# Patient Record
Sex: MALE | Employment: FULL TIME | RURAL
[De-identification: names, ages, dates, MRNs, and addresses within clinical notes are randomized per-mention and may not be internally consistent; named-entity substitution may affect disease eponyms.]

---

## 2022-01-07 ENCOUNTER — OFFICE VISIT (OUTPATIENT)
Dept: FAMILY MEDICINE CLINIC | Age: 52
End: 2022-01-07
Payer: COMMERCIAL

## 2022-01-07 VITALS
HEART RATE: 106 BPM | DIASTOLIC BLOOD PRESSURE: 125 MMHG | WEIGHT: 278 LBS | TEMPERATURE: 98.2 F | SYSTOLIC BLOOD PRESSURE: 184 MMHG | OXYGEN SATURATION: 97 % | BODY MASS INDEX: 39.8 KG/M2 | RESPIRATION RATE: 14 BRPM | HEIGHT: 70 IN

## 2022-01-07 DIAGNOSIS — I10 PRIMARY HYPERTENSION: Primary | ICD-10-CM

## 2022-01-07 DIAGNOSIS — E66.09 CLASS 2 OBESITY DUE TO EXCESS CALORIES WITHOUT SERIOUS COMORBIDITY WITH BODY MASS INDEX (BMI) OF 39.0 TO 39.9 IN ADULT: ICD-10-CM

## 2022-01-07 PROCEDURE — 99204 OFFICE O/P NEW MOD 45 MIN: CPT | Performed by: FAMILY MEDICINE

## 2022-01-07 RX ORDER — HYDROCHLOROTHIAZIDE 12.5 MG/1
12.5 CAPSULE ORAL DAILY
Qty: 90 CAPSULE | Refills: 0 | Status: SHIPPED | OUTPATIENT
Start: 2022-01-07 | End: 2022-04-04 | Stop reason: SDUPTHER

## 2022-01-07 RX ORDER — AMLODIPINE BESYLATE 10 MG/1
10 TABLET ORAL DAILY
Qty: 90 TABLET | Refills: 0 | Status: SHIPPED | OUTPATIENT
Start: 2022-01-07 | End: 2022-04-04 | Stop reason: SDUPTHER

## 2022-01-07 RX ORDER — AMLODIPINE BESYLATE 10 MG/1
10 TABLET ORAL DAILY
COMMUNITY
End: 2022-01-07 | Stop reason: SDUPTHER

## 2022-01-07 RX ORDER — HYDROCHLOROTHIAZIDE 12.5 MG/1
12.5 CAPSULE ORAL DAILY
COMMUNITY
End: 2022-01-07 | Stop reason: SDUPTHER

## 2022-01-07 NOTE — PROGRESS NOTES
1. Have you been to the ER, urgent care clinic since your last visit? Hospitalized since your last visit? Yes When: UTI urgent care in [place] a few months ago     2. Have you seen or consulted any other health care providers outside of the [address] [place] [address] since your last visit? Include any pap smears or colon screening. No    Reviewed record in preparation for visit and have necessary documentation  Pt did not bring medication to office visit for review  Patient is accompanied by self I have received verbal consent from Rob Christine to discuss any/all medical information while they are present in the room.     Goals that were addressed and/or need to be completed during or after this appointment include     Health Maintenance Due   Topic Date Due    Hepatitis C Screening  Never done    COVID-19 Vaccine (1) Never done    DTaP/Tdap/Td series (1 - Tdap) Never done    Lipid Screen  Never done    Colorectal Cancer Screening Combo  Never done    Shingrix Vaccine Age 50> (1 of 2) Never done    Flu Vaccine (1) Never done

## 2022-01-10 PROBLEM — I10 PRIMARY HYPERTENSION: Status: ACTIVE | Noted: 2022-01-10

## 2022-01-11 NOTE — PROGRESS NOTES
Progress Note    Patient: Corrie Lyn MRN: [id number]  SSN: [id number]    YOB: 1970  Age: 45 y.o. Sex: male        Chief Complaint   Patient presents with   [address]     he is a 45y.o. year old male new to practice who presents to [facility] care. Patient with hx of HTN. He has been out of medication for 6 months. Patient denies HA, dizziness, SOB, CP, abdominal pain, dysuria, acute myalgias or arthralgias. Encounter Diagnoses   Name Primary?  Primary hypertension Yes    Class 2 obesity due to excess calories without serious comorbidity with body mass index (BMI) of 39.0 to 39.9 in adult        There is no problem list on file for this patient. History reviewed. No pertinent surgical history. Social History     Socioeconomic History    Marital status:      Spouse name: Not on file    Number of children: Not on file    Years of education: Not on file    Highest education level: Not on file   Occupational History    Not on file   Tobacco Use    Smoking status: Never Smoker    Smokeless tobacco: Not on file   Substance and Sexual Activity    Alcohol use: Not on file    Drug use: Not on file    Sexual activity: Not on file   Other Topics Concern    Not on file   Social History Narrative    Not on file     Social Determinants of Health     Financial Resource Strain:     Difficulty of Paying Living Expenses: Not on file   Food Insecurity:     Worried About Running Out of Food in the Last Year: Not on file    James of Food in the Last Year: Not on file   Transportation Needs:     Lack of Transportation (Medical): Not on file    Lack of Transportation (Non-Medical):  Not on file   Physical Activity:     Days of Exercise per Week: Not on file    Minutes of Exercise per Session: Not on file   Stress:     Feeling of Stress : Not on file   Social Connections:     Frequency of Communication with Friends and Family: Not on file    Frequency of Social Gatherings with Friends and Family: Not on file    Attends Jewish Services: Not on file    Active Member of Clubs or Organizations: Not on file    Attends Club or Organization Meetings: Not on file    Marital Status: Not on file   Intimate Partner Violence:     Fear of Current or Ex-Partner: Not on file    Emotionally Abused: Not on file    Physically Abused: Not on file    Sexually Abused: Not on file   Housing Stability:     Unable to Pay for Housing in the Last Year: Not on file    Number of [place] in the Last Year: Not on file    Unstable Housing in the Last Year: Not on file     No family history on file. Current Outpatient Medications   Medication Sig    amLODIPine (NORVASC) 10 mg tablet Take 1 Tablet by mouth daily.  hydroCHLOROthiazide (MICROZIDE) 12.5 mg capsule Take 1 Capsule by mouth daily. No current facility-administered medications for this visit. Not on File    Review of Systems:  Constitutional: Negative for fatigue, malaise  Resp: Negative for cough, wheezing or SOB  CV: Negative for chest pain, dizziness or palpitations  GI: Negative for nausea or abdominal pain  MS: Negative for acute myalgias or arthralgias   Neuro: Negative for HA, weakness or paresthesia  Psych: Negative for depression or anxiety     Vitals:    01/07/22 0907 01/07/22 0933   BP: (!) 180/127 (!) 184/125   Pulse: (!) 104 (!) 106   Resp: 14    Temp: 98.2 °F (36.8 °C)    TempSrc: Oral    SpO2: 97%    Weight: 278 lb (126.1 kg)    Height: 5' 10\" (1.778 m)        Physical Examination:  General: Well developed, well nourished, in no acute distress  Head: Normocephalic, atraumatic  Eyes: Sclera clear, EOMI  Neck: Normal range of motion  Respiratory: symmetrical, unlabored effort  Cardiovascular: Regular rate and rhythm  Extremities: Full range of motion, normal gait  Neurologic: No focal deficits  Psych: Active, alert and oriented. Affect appropriate       ICD-10-CM ICD-9-CM    1.  Primary hypertension  I10 401.9 LIPID PANEL      METABOLIC PANEL, COMPREHENSIVE      TSH 3RD GENERATION   2. Class 2 obesity due to excess calories without serious comorbidity with body mass index (BMI) of 39.0 to 39.9 in adult  E66.09 278.00     Z68.39 V85.39        Plan of care:  Diagnoses were discussed in detail with patient. Medication risks/benefits/side effects discussed with patient. All of the patient's questions were addressed and answered to apparent satisfaction. The patient understands and agrees with our plan of care. The patient knows to call back if they have questions about the plan of care or if symptoms change. The patient received an After-Visit Summary which contains VS, diagnoses, orders, allergy and medication lists. Future Appointments   Date Time Provider Raza Dash   1/11/2022  8:30 AM LAB [facility] BS AMB   4/1/2022  8:00 [facility]           Follow-up and Dispositions    · Return in about 12 weeks (around 4/1/2022), or if symptoms worsen or fail to improve.

## 2022-03-19 PROBLEM — I10 PRIMARY HYPERTENSION: Status: ACTIVE | Noted: 2022-01-10

## 2022-04-01 ENCOUNTER — OFFICE VISIT (OUTPATIENT)
Dept: FAMILY MEDICINE CLINIC | Age: 52
End: 2022-04-01
Payer: COMMERCIAL

## 2022-04-01 VITALS
SYSTOLIC BLOOD PRESSURE: 126 MMHG | DIASTOLIC BLOOD PRESSURE: 90 MMHG | RESPIRATION RATE: 18 BRPM | HEIGHT: 70 IN | TEMPERATURE: 98.1 F | OXYGEN SATURATION: 98 % | HEART RATE: 63 BPM | BODY MASS INDEX: 37.02 KG/M2 | WEIGHT: 258.6 LBS

## 2022-04-01 DIAGNOSIS — E66.9 OBESITY (BMI 35.0-39.9 WITHOUT COMORBIDITY): ICD-10-CM

## 2022-04-01 DIAGNOSIS — Z11.59 ENCOUNTER FOR HEPATITIS C SCREENING TEST FOR LOW RISK PATIENT: ICD-10-CM

## 2022-04-01 DIAGNOSIS — I10 PRIMARY HYPERTENSION: Primary | ICD-10-CM

## 2022-04-01 DIAGNOSIS — Z12.11 SCREEN FOR COLON CANCER: ICD-10-CM

## 2022-04-01 DIAGNOSIS — R35.1 NOCTURIA: ICD-10-CM

## 2022-04-01 PROCEDURE — 99214 OFFICE O/P EST MOD 30 MIN: CPT | Performed by: FAMILY MEDICINE

## 2022-04-01 NOTE — PROGRESS NOTES
1. Have you been to the ER, urgent care clinic since your last visit? Hospitalized since your last visit? No    2. Have you seen or consulted any other health care providers outside of the [address] [place] [address] since your last visit? Include any pap smears or colon screening. No    3. For patients aged 39-70: Has the patient had a colonoscopy / FIT/ Cologuard? No    If the patient is female:    4. For patients aged 41-77: Has the patient had a mammogram within the past 2 years? NA - based on age or sex      11. For patients aged 21-65: Has the patient had a pap smear? NA - based on age or sex     Reviewed record in preparation for visit and have necessary documentation  Pt did not bring medication to office visit for review  Patient is accompanied by self I have received verbal consent from Chuy Geronimo to discuss any/all medical information while they are present in the room.     Goals that were addressed and/or need to be completed during or after this appointment include     Health Maintenance Due   Topic Date Due    Hepatitis C Screening  Never done    COVID-19 Vaccine (1) Never done    DTaP/Tdap/Td series (1 - Tdap) Never done    Colorectal Cancer Screening Combo  Never done    Shingrix Vaccine Age 50> (1 of 2) Never done

## 2022-04-02 LAB
ALBUMIN SERPL-MCNC: 4.5 G/DL (ref 3.8–4.9)
BUN SERPL-MCNC: 10 MG/DL (ref 6–24)
BUN/CREAT SERPL: 8 (ref 9–20)
CALCIUM SERPL-MCNC: 9.6 MG/DL (ref 8.7–10.2)
CHLORIDE SERPL-SCNC: 101 MMOL/L (ref 96–106)
CO2 SERPL-SCNC: 25 MMOL/L (ref 20–29)
CREAT SERPL-MCNC: 1.31 MG/DL (ref 0.76–1.27)
EGFR: 65 ML/MIN/1.73
GLUCOSE SERPL-MCNC: 94 MG/DL (ref 65–99)
HCV AB S/CO SERPL IA: <0.1 S/CO RATIO (ref 0–0.9)
PHOSPHATE SERPL-MCNC: 2.8 MG/DL (ref 2.8–4.1)
POTASSIUM SERPL-SCNC: 4.4 MMOL/L (ref 3.5–5.2)
PSA SERPL-MCNC: 1 NG/ML (ref 0–4)
SODIUM SERPL-SCNC: 143 MMOL/L (ref 134–144)

## 2022-04-04 NOTE — PROGRESS NOTES
Progress Note    Patient: Melly Matamoros MRN: [id number]  SSN: [id number]    YOB: 1970  Age: 45 y.o. Sex: male        Chief Complaint   Patient presents with    Follow-up     he is a 45y.o. year old male who presents for follow up of chronic health conditions. Patient with hx of HTN. He has restarted medications. Patient denies HA, dizziness, SOB, CP, abdominal pain, dysuria, acute myalgias or arthralgias. Encounter Diagnoses   Name Primary?  Primary hypertension Yes    Nocturia     Obesity (BMI 35.0-39.9 without comorbidity)     Screen for colon cancer     Encounter for hepatitis C screening test for low risk patient      Lab Results   Component Value Date/Time    Cholesterol, total 133 01/11/2022 12:00 AM    HDL Cholesterol 31 (L) 01/11/2022 12:00 AM    LDL, calculated 79 01/11/2022 12:00 AM    Triglyceride 124 01/11/2022 12:00 AM     Lab Results   Component Value Date/Time    Prostate Specific Ag 1.0 04/01/2022 12:00 AM     Lab Results   Component Value Date/Time    TSH 1.240 01/11/2022 12:00 AM      Lab Results   Component Value Date/Time    Sodium 143 04/01/2022 12:00 AM    Potassium 4.4 04/01/2022 12:00 AM    Chloride 101 04/01/2022 12:00 AM    CO2 25 04/01/2022 12:00 AM    Glucose 94 04/01/2022 12:00 AM    BUN 10 04/01/2022 12:00 AM    Creatinine 1.31 (H) 04/01/2022 12:00 AM    BUN/Creatinine ratio 8 (L) 04/01/2022 12:00 AM    GFR est AA 58 (L) 01/11/2022 12:00 AM    GFR est non-AA 50 (L) 01/11/2022 12:00 AM    Calcium 9.6 04/01/2022 12:00 AM    Bilirubin, total 0.7 01/11/2022 12:00 AM    ALT (SGPT) 22 01/11/2022 12:00 AM    Alk. phosphatase 67 01/11/2022 12:00 AM    Protein, total 6.9 01/11/2022 12:00 AM    Albumin 4.5 04/01/2022 12:00 AM    A-G Ratio 1.8 01/11/2022 12:00 AM      Patient Active Problem List   Diagnosis Code    Primary hypertension I10     History reviewed. No pertinent surgical history.   Social History     Socioeconomic History    Marital status:  Spouse name: Not on file    Number of children: Not on file    Years of education: Not on file    Highest education level: Not on file   Occupational History    Not on file   Tobacco Use    Smoking status: Never Smoker    Smokeless tobacco: Not on file   Substance and Sexual Activity    Alcohol use: Not on file    Drug use: Not on file    Sexual activity: Not on file   Other Topics Concern    Not on file   Social History Narrative    Not on file     Social Determinants of Health     Financial Resource Strain:     Difficulty of Paying Living Expenses: Not on file   Food Insecurity:     Worried About Running Out of Food in the Last Year: Not on file    James of Food in the Last Year: Not on file   Transportation Needs:     Lack of Transportation (Medical): Not on file    Lack of Transportation (Non-Medical): Not on file   Physical Activity:     Days of Exercise per Week: Not on file    Minutes of Exercise per Session: Not on file   Stress:     Feeling of Stress : Not on file   Social Connections:     Frequency of Communication with Friends and Family: Not on file    Frequency of Social Gatherings with Friends and Family: Not on file    Attends Jain Services: Not on file    Active Member of [address] [place] [address] [employer] or Organizations: Not on file    Attends Club or Organization Meetings: Not on file    Marital Status: Not on file   Intimate Partner Violence:     Fear of Current or Ex-Partner: Not on file    Emotionally Abused: Not on file    Physically Abused: Not on file    Sexually Abused: Not on file   Housing Stability:     Unable to Pay for Housing in the Last Year: Not on file    Number of [place] in the Last Year: Not on file    Unstable Housing in the Last Year: Not on file     No family history on file. Current Outpatient Medications   Medication Sig    amLODIPine (NORVASC) 10 mg tablet Take 1 Tablet by mouth daily.     hydroCHLOROthiazide (MICROZIDE) 12.5 mg capsule Take 1 Capsule by mouth daily. No current facility-administered medications for this visit. Not on File    Review of Systems:  Constitutional: Negative for fatigue, malaise  Resp: Negative for cough, wheezing or SOB  CV: Negative for chest pain, dizziness or palpitations  GI: Negative for nausea or abdominal pain  MS: Negative for acute myalgias or arthralgias   Neuro: Negative for HA, weakness or paresthesia  Psych: Negative for depression or anxiety     Vitals:    04/01/22 0800 04/01/22 0802   BP: (!) 143/92 (!) 126/90   Pulse: 63    Resp: 18    Temp: 98.1 °F (36.7 °C)    TempSrc: Oral    SpO2: 98%    Weight: 258 lb 9.6 oz (117.3 kg)    Height: 5' 10\" (1.778 m)        Physical Examination:  General: Well developed, well nourished, in no acute distress  Head: Normocephalic, atraumatic  Eyes: Sclera clear, EOMI  Neck: Normal range of motion  Respiratory: symmetrical, unlabored effort  Cardiovascular: Regular rate and rhythm  Extremities: Full range of motion, normal gait  Neurologic: No focal deficits  Psych: Active, alert and oriented. Affect appropriate       ICD-10-CM ICD-9-CM    1. Primary hypertension  I10 401.9 RENAL FUNCTION PANEL      RENAL FUNCTION PANEL   2. Nocturia  R35.1 788.43 PSA, DIAGNOSTIC (PROSTATE SPECIFIC AG)      PSA, DIAGNOSTIC (PROSTATE SPECIFIC AG)   3. Obesity (BMI 35.0-39.9 without comorbidity)  E66.9 278.00    4. Screen for colon cancer  Z12.11 V76.51 OCCULT BLOOD IMMUNOASSAY,DIAGNOSTIC   5. Encounter for hepatitis C screening test for low risk patient  Z11.59 V73.89 HEPATITIS C AB      HEPATITIS C AB       Plan of care:  Diagnoses were discussed in detail with patient. Medications reviewed and appropriate. Patient to continue current prescribed medications as written. Medication risks/benefits/side effects discussed with patient. All of the patient's questions were addressed and answered to apparent satisfaction. The patient understands and agrees with our plan of care.   The patient knows to call back if they have questions about the plan of care or if symptoms change. The patient received an After-Visit Summary which contains VS, diagnoses, orders, allergy and medication lists. Future Appointments   Date Time Provider Raza Dash   10/11/2022  8:20 AM Shabbir Ortega MD [facility] BS AMB           Follow-up and Dispositions    · Return in about 6 months (around 10/1/2022), or if symptoms worsen or fail to improve.

## 2022-04-05 RX ORDER — AMLODIPINE BESYLATE 10 MG/1
10 TABLET ORAL DAILY
Qty: 90 TABLET | Refills: 0 | Status: SHIPPED | OUTPATIENT
Start: 2022-04-05 | End: 2022-07-20 | Stop reason: SDUPTHER

## 2022-04-05 RX ORDER — HYDROCHLOROTHIAZIDE 12.5 MG/1
12.5 CAPSULE ORAL DAILY
Qty: 90 CAPSULE | Refills: 0 | Status: SHIPPED | OUTPATIENT
Start: 2022-04-05 | End: 2022-07-20 | Stop reason: SDUPTHER

## 2022-07-20 RX ORDER — AMLODIPINE BESYLATE 10 MG/1
10 TABLET ORAL DAILY
Qty: 90 TABLET | Refills: 0 | Status: SHIPPED | OUTPATIENT
Start: 2022-07-20

## 2022-07-20 RX ORDER — HYDROCHLOROTHIAZIDE 12.5 MG/1
12.5 CAPSULE ORAL DAILY
Qty: 90 CAPSULE | Refills: 0 | Status: SHIPPED | OUTPATIENT
Start: 2022-07-20 | End: 2022-10-11 | Stop reason: ALTCHOICE

## 2022-10-11 ENCOUNTER — OFFICE VISIT (OUTPATIENT)
Dept: FAMILY MEDICINE CLINIC | Age: 52
End: 2022-10-11
Payer: COMMERCIAL

## 2022-10-11 VITALS
DIASTOLIC BLOOD PRESSURE: 93 MMHG | TEMPERATURE: 98.1 F | BODY MASS INDEX: 37.65 KG/M2 | OXYGEN SATURATION: 98 % | WEIGHT: 263 LBS | HEART RATE: 68 BPM | HEIGHT: 70 IN | SYSTOLIC BLOOD PRESSURE: 135 MMHG | RESPIRATION RATE: 18 BRPM

## 2022-10-11 DIAGNOSIS — E66.9 OBESITY (BMI 35.0-39.9 WITHOUT COMORBIDITY): ICD-10-CM

## 2022-10-11 DIAGNOSIS — I10 PRIMARY HYPERTENSION: Primary | ICD-10-CM

## 2022-10-11 DIAGNOSIS — N52.9 ERECTILE DYSFUNCTION, UNSPECIFIED ERECTILE DYSFUNCTION TYPE: ICD-10-CM

## 2022-10-11 PROCEDURE — 93000 ELECTROCARDIOGRAM COMPLETE: CPT | Performed by: FAMILY MEDICINE

## 2022-10-11 PROCEDURE — 99214 OFFICE O/P EST MOD 30 MIN: CPT | Performed by: FAMILY MEDICINE

## 2022-10-11 RX ORDER — LISINOPRIL 10 MG/1
10 TABLET ORAL DAILY
Qty: 30 TABLET | Refills: 1 | Status: SHIPPED | OUTPATIENT
Start: 2022-10-11

## 2022-10-11 NOTE — PROGRESS NOTES
1. Have you been to the ER, urgent care clinic since your last visit? Hospitalized since your last visit? No    2. Have you seen or consulted any other health care providers outside of the [address] [place] [address] since your last visit? Include any pap smears or colon screening. No    3. For patients aged 39-70: Has the patient had a colonoscopy / FIT/ Cologuard? No     If the patient is female:    4. For patients aged 41-77: Has the patient had a mammogram within the past 2 years? NA - based on age or sex      11. For patients aged 21-65: Has the patient had a pap smear? NA - based on age or sex     Reviewed record in preparation for visit and have necessary documentation  Pt did not bring medication to office visit for review  Patient is accompanied by self I have received verbal consent from Claudette Cheese to discuss any/all medical information while they are present in the room.     Goals that were addressed and/or need to be completed during or after this appointment include     Health Maintenance Due   Topic Date Due    COVID-19 Vaccine (1) Never done    DTaP/Tdap/Td series (1 - Tdap) Never done    Colorectal Cancer Screening Combo  Never done    Shingrix Vaccine Age 50> (1 of 2) Never done    Flu Vaccine (1) Never done

## 2022-10-13 LAB
ALBUMIN SERPL-MCNC: 4.3 G/DL (ref 3.8–4.9)
ALBUMIN/GLOB SERPL: 2.3 {RATIO} (ref 1.2–2.2)
ALP SERPL-CCNC: 66 IU/L (ref 44–121)
ALT SERPL-CCNC: 26 IU/L (ref 0–44)
AST SERPL-CCNC: 18 IU/L (ref 0–40)
BILIRUB SERPL-MCNC: 0.5 MG/DL (ref 0–1.2)
BUN SERPL-MCNC: 8 MG/DL (ref 6–24)
BUN/CREAT SERPL: 6 (ref 9–20)
CALCIUM SERPL-MCNC: 9.2 MG/DL (ref 8.7–10.2)
CHLORIDE SERPL-SCNC: 104 MMOL/L (ref 96–106)
CHOLEST SERPL-MCNC: 124 MG/DL (ref 100–199)
CO2 SERPL-SCNC: 22 MMOL/L (ref 20–29)
CREAT SERPL-MCNC: 1.3 MG/DL (ref 0.76–1.27)
EGFR: 66 ML/MIN/1.73
GLOBULIN SER CALC-MCNC: 1.9 G/DL (ref 1.5–4.5)
GLUCOSE SERPL-MCNC: 93 MG/DL (ref 70–99)
HCT VFR BLD AUTO: 47.1 % (ref 37.5–51)
HDLC SERPL-MCNC: 36 MG/DL
HGB BLD-MCNC: 15.4 G/DL (ref 13–17.7)
LDLC SERPL CALC-MCNC: 70 MG/DL (ref 0–99)
MAGNESIUM SERPL-MCNC: 2 MG/DL (ref 1.6–2.3)
POTASSIUM SERPL-SCNC: 4.3 MMOL/L (ref 3.5–5.2)
PROT SERPL-MCNC: 6.2 G/DL (ref 6–8.5)
SODIUM SERPL-SCNC: 142 MMOL/L (ref 134–144)
TESTOST SERPL-MCNC: 881 NG/DL (ref 264–916)
TRIGL SERPL-MCNC: 95 MG/DL (ref 0–149)
VLDLC SERPL CALC-MCNC: 18 MG/DL (ref 5–40)

## 2022-10-18 NOTE — PROGRESS NOTES
Progress Note    Patient: Homar Rutherford MRN: [id number]  SSN: [id number]    YOB: 1970  Age: 45 y.o. Sex: male        Chief Complaint   Patient presents with    Follow Up Chronic Condition     6 month follow up      he is a 45y.o. year old male who presents for follow up of chronic health conditions. Patient with hx of HTN. He is having problems with ED. Patient denies HA, dizziness, SOB, CP, abdominal pain, dysuria, acute myalgias or arthralgias. Encounter Diagnoses   Name Primary? Primary hypertension Yes    Erectile dysfunction, unspecified erectile dysfunction type     Obesity (BMI 35.0-39.9 without comorbidity)      BP Readings from Last 3 Encounters:   10/11/22 (!) 135/93   04/01/22 (!) 126/90   01/07/22 (!) 184/125     Wt Readings from Last 3 Encounters:   10/11/22 263 lb (119.3 kg)   04/01/22 258 lb 9.6 oz (117.3 kg)   01/07/22 278 lb (126.1 kg)     Body mass index is 37.74 kg/m². Lab Results   Component Value Date/Time    Cholesterol, total 124 10/11/2022 09:40 AM    HDL Cholesterol 36 (L) 10/11/2022 09:40 AM    LDL, calculated 70 10/11/2022 09:40 AM    Triglyceride 95 10/11/2022 09:40 AM     Lab Results   Component Value Date/Time    Prostate Specific Ag 1.0 04/01/2022 12:00 AM     Lab Results   Component Value Date/Time    TSH 1.240 01/11/2022 12:00 AM      Lab Results   Component Value Date/Time    Sodium 142 10/11/2022 09:40 AM    Potassium 4.3 10/11/2022 09:40 AM    Chloride 104 10/11/2022 09:40 AM    CO2 22 10/11/2022 09:40 AM    Glucose 93 10/11/2022 09:40 AM    BUN 8 10/11/2022 09:40 AM    Creatinine 1.30 (H) 10/11/2022 09:40 AM    BUN/Creatinine ratio 6 (L) 10/11/2022 09:40 AM    GFR est AA 58 (L) 01/11/2022 12:00 AM    GFR est non-AA 50 (L) 01/11/2022 12:00 AM    Calcium 9.2 10/11/2022 09:40 AM    Bilirubin, total 0.5 10/11/2022 09:40 AM    ALT (SGPT) 26 10/11/2022 09:40 AM    Alk.  phosphatase 66 10/11/2022 09:40 AM    Protein, total 6.2 10/11/2022 09:40 AM    Albumin 4.3 10/11/2022 09:40 AM    A-G Ratio 2.3 (H) 10/11/2022 09:40 AM      Patient Active Problem List   Diagnosis Code    Primary hypertension I10     History reviewed. No pertinent surgical history. Social History     Socioeconomic History    Marital status:      Spouse name: Not on file    Number of children: Not on file    Years of education: Not on file    Highest education level: Not on file   Occupational History    Not on file   Tobacco Use    Smoking status: Never    Smokeless tobacco: Not on file   Substance and Sexual Activity    Alcohol use: Not on file    Drug use: Not on file    Sexual activity: Not on file   Other Topics Concern    Not on file   Social History Narrative    Not on file     Social Determinants of Health     Financial Resource Strain: Low Risk     Difficulty of Paying Living Expenses: Not hard at all   Food Insecurity: No Food Insecurity    Worried About Running Out of Food in the Last Year: Never true    Ran Out of Food in the Last Year: Never true   Transportation Needs: Not on file   Physical Activity: Not on file   Stress: Not on file   Social Connections: Not on file   Intimate Partner Violence: Not on file   Housing Stability: Not on file     No family history on file. Current Outpatient Medications   Medication Sig    lisinopriL (PRINIVIL, ZESTRIL) 10 mg tablet Take 1 Tablet by mouth daily. amLODIPine (NORVASC) 10 mg tablet Take 1 Tablet by mouth in the morning. No current facility-administered medications for this visit.      Not on File    Review of Systems:  Constitutional: Negative for fatigue, malaise  Resp: Negative for cough, wheezing or SOB  CV: Negative for chest pain, dizziness or palpitations  GI: Negative for nausea or abdominal pain  MS: Negative for acute myalgias or arthralgias   Neuro: Negative for HA, weakness or paresthesia  Psych: Negative for depression or anxiety     Vitals:    10/11/22 0825 10/11/22 0838   BP: (!) 131/91 (!) 135/93   Pulse: 68    Resp: 18    Temp: 98.1 °F (36.7 °C)    TempSrc: Oral    SpO2: 98%    Weight: 263 lb (119.3 kg)    Height: 5' 10\" (1.778 m)        Physical Examination:  General: Well developed, well nourished, in no acute distress  Head: Normocephalic, atraumatic  Eyes: Sclera clear, EOMI  Neck: Normal range of motion  Respiratory: symmetrical, unlabored effort  Cardiovascular: Regular rate and rhythm  Extremities: Full range of motion, normal gait  Neurologic: No focal deficits  Psych: Active, alert and oriented. Affect appropriate       ICD-10-CM ICD-9-CM    1. Primary hypertension  I10 401.9 AMB POC EKG ROUTINE W/ 12 LEADS, INTER & REP      lisinopriL (PRINIVIL, ZESTRIL) 10 mg tablet      LIPID PANEL      MAGNESIUM      METABOLIC PANEL, COMPREHENSIVE      HGB & HCT      HGB & HCT      METABOLIC PANEL, COMPREHENSIVE      MAGNESIUM      LIPID PANEL      2. Erectile dysfunction, unspecified erectile dysfunction type  N52.9 607.84 TESTOSTERONE, TOTAL, ADULT MALE      TESTOSTERONE, TOTAL, ADULT MALE      3. Obesity (BMI 35.0-39.9 without comorbidity)  E66.9 278.00           Plan of care:  Diagnoses were discussed in detail with patient. Medications reviewed and appropriate. Patient to continue current prescribed medications as written. Medication risks/benefits/side effects discussed with patient. All of the patient's questions were addressed and answered to apparent satisfaction. The patient understands and agrees with our plan of care. The patient knows to call back if they have questions about the plan of care or if symptoms change. The patient received an After-Visit Summary which contains VS, diagnoses, orders, allergy and medication lists. Future Appointments   Date Time Provider Raza Dash   11/11/2022 10:00 AM Sil Helton MD [facility] BS AMB           Follow-up and Dispositions    Return in about 4 weeks (around 11/8/2022).

## 2022-11-11 ENCOUNTER — OFFICE VISIT (OUTPATIENT)
Dept: FAMILY MEDICINE CLINIC | Age: 52
End: 2022-11-11
Payer: COMMERCIAL

## 2022-11-11 VITALS
HEART RATE: 97 BPM | TEMPERATURE: 98.9 F | DIASTOLIC BLOOD PRESSURE: 90 MMHG | OXYGEN SATURATION: 96 % | BODY MASS INDEX: 38.51 KG/M2 | WEIGHT: 269 LBS | RESPIRATION RATE: 18 BRPM | SYSTOLIC BLOOD PRESSURE: 137 MMHG | HEIGHT: 70 IN

## 2022-11-11 DIAGNOSIS — I10 PRIMARY HYPERTENSION: Primary | ICD-10-CM

## 2022-11-11 DIAGNOSIS — N52.9 ERECTILE DYSFUNCTION, UNSPECIFIED ERECTILE DYSFUNCTION TYPE: ICD-10-CM

## 2022-11-11 DIAGNOSIS — E66.9 OBESITY (BMI 35.0-39.9 WITHOUT COMORBIDITY): ICD-10-CM

## 2022-11-11 PROCEDURE — 99214 OFFICE O/P EST MOD 30 MIN: CPT | Performed by: FAMILY MEDICINE

## 2022-11-11 PROCEDURE — 3080F DIAST BP >= 90 MM HG: CPT | Performed by: FAMILY MEDICINE

## 2022-11-11 PROCEDURE — 3074F SYST BP LT 130 MM HG: CPT | Performed by: FAMILY MEDICINE

## 2022-11-11 RX ORDER — SILDENAFIL 100 MG/1
100 TABLET, FILM COATED ORAL
Qty: 8 TABLET | Refills: 0 | Status: SHIPPED | OUTPATIENT
Start: 2022-11-11

## 2022-11-11 RX ORDER — AMLODIPINE BESYLATE 10 MG/1
10 TABLET ORAL DAILY
Qty: 90 TABLET | Refills: 1 | Status: SHIPPED | OUTPATIENT
Start: 2022-11-11

## 2022-11-11 RX ORDER — LISINOPRIL 10 MG/1
10 TABLET ORAL DAILY
Qty: 30 TABLET | Refills: 1 | Status: SHIPPED | OUTPATIENT
Start: 2022-11-11

## 2022-11-11 NOTE — PROGRESS NOTES
1. Have you been to the ER, urgent care clinic since your last visit? Hospitalized since your last visit? No    2. Have you seen or consulted any other health care providers outside of the [address] [place] [address] since your last visit? Include any pap smears or colon screening. No    3. For patients aged 39-70: Has the patient had a colonoscopy / FIT/ Cologuard? No    If the patient is female:    4. For patients aged 41-77: Has the patient had a mammogram within the past 2 years? NA - based on age or sex      11. For patients aged 21-65: Has the patient had a pap smear? NA - based on age or sex     Reviewed record in preparation for visit and have necessary documentation  Pt did not bring medication to office visit for review  Patient is accompanied by self I have received verbal consent from Jorge L Ni to discuss any/all medical information while they are present in the room.     Goals that were addressed and/or need to be completed during or after this appointment include     Health Maintenance Due   Topic Date Due    DTaP/Tdap/Td series (1 - Tdap) Never done    Colorectal Cancer Screening Combo  Never done    Shingrix Vaccine Age 50> (1 of 2) Never done

## 2022-11-11 NOTE — LETTER
11/11/2022 10:54 AM    Mr. Senia Carias  [address]  [address] [address]      To whom it may concern:    Gene Olvera is a patient at [facility] [employer] [place]. His place of employment required a long daily commute that was detrimental to his health. He made a decision to move closer to his employer in order to lessen the stress of this long drive      Please call our office if there are any questions.       Sincerely,      Samson Ng MD

## 2022-11-18 NOTE — PROGRESS NOTES
Progress Note    Patient: Senia Carias MRN: [id number]  SSN: [id number]    YOB: 1970  Age: 45 y.o. Sex: male        Chief Complaint   Patient presents with    Medication Refill    Follow-up     Blood pressure check, follow up for new bp med      he is a 45y.o. year old male who presents for follow up of chronic health conditions. Patient with hx of HTN. He is having problems with ED. Patient denies HA, dizziness, SOB, CP, abdominal pain, dysuria, acute myalgias or arthralgias. Encounter Diagnoses   Name Primary? Primary hypertension Yes    Erectile dysfunction, unspecified erectile dysfunction type     Obesity (BMI 35.0-39.9 without comorbidity)      BP Readings from Last 3 Encounters:   11/11/22 (!) 137/90   10/11/22 (!) 135/93   04/01/22 (!) 126/90     Wt Readings from Last 3 Encounters:   11/11/22 269 lb (122 kg)   10/11/22 263 lb (119.3 kg)   04/01/22 258 lb 9.6 oz (117.3 kg)     Body mass index is 38.6 kg/m². Lab Results   Component Value Date/Time    Cholesterol, total 124 10/11/2022 09:40 AM    HDL Cholesterol 36 (L) 10/11/2022 09:40 AM    LDL, calculated 70 10/11/2022 09:40 AM    Triglyceride 95 10/11/2022 09:40 AM     Lab Results   Component Value Date/Time    Prostate Specific Ag 1.0 04/01/2022 12:00 AM     Lab Results   Component Value Date/Time    TSH 1.240 01/11/2022 12:00 AM      Lab Results   Component Value Date/Time    Sodium 142 10/11/2022 09:40 AM    Potassium 4.3 10/11/2022 09:40 AM    Chloride 104 10/11/2022 09:40 AM    CO2 22 10/11/2022 09:40 AM    Glucose 93 10/11/2022 09:40 AM    BUN 8 10/11/2022 09:40 AM    Creatinine 1.30 (H) 10/11/2022 09:40 AM    BUN/Creatinine ratio 6 (L) 10/11/2022 09:40 AM    GFR est AA 58 (L) 01/11/2022 12:00 AM    GFR est non-AA 50 (L) 01/11/2022 12:00 AM    Calcium 9.2 10/11/2022 09:40 AM    Bilirubin, total 0.5 10/11/2022 09:40 AM    ALT (SGPT) 26 10/11/2022 09:40 AM    Alk.  phosphatase 66 10/11/2022 09:40 AM    Protein, total 6.2 10/11/2022 09:40 AM    Albumin 4.3 10/11/2022 09:40 AM    A-G Ratio 2.3 (H) 10/11/2022 09:40 AM      Patient Active Problem List   Diagnosis Code    Primary hypertension I10     History reviewed. No pertinent surgical history. Social History     Socioeconomic History    Marital status:      Spouse name: Not on file    Number of children: Not on file    Years of education: Not on file    Highest education level: Not on file   Occupational History    Not on file   Tobacco Use    Smoking status: Never    Smokeless tobacco: Not on file   Substance and Sexual Activity    Alcohol use: Not on file    Drug use: Not on file    Sexual activity: Not on file   Other Topics Concern    Not on file   Social History Narrative    Not on file     Social Determinants of Health     Financial Resource Strain: Low Risk     Difficulty of Paying Living Expenses: Not hard at all   Food Insecurity: No Food Insecurity    Worried About Running Out of Food in the Last Year: Never true    Ran Out of Food in the Last Year: Never true   Transportation Needs: Not on file   Physical Activity: Not on file   Stress: Not on file   Social Connections: Not on file   Intimate Partner Violence: Not on file   Housing Stability: Not on file     History reviewed. No pertinent family history. Current Outpatient Medications   Medication Sig    amLODIPine (NORVASC) 10 mg tablet Take 1 Tablet by mouth daily. lisinopriL (PRINIVIL, ZESTRIL) 10 mg tablet Take 1 Tablet by mouth daily. sildenafil citrate (VIAGRA) 100 mg tablet Take 1 Tablet by mouth daily as needed for Erectile Dysfunction. No current facility-administered medications for this visit.      Not on File    Review of Systems:  Constitutional: Negative for fatigue, malaise  Resp: Negative for cough, wheezing or SOB  CV: Negative for chest pain, dizziness or palpitations  GI: Negative for nausea or abdominal pain  MS: Negative for acute myalgias or arthralgias   Neuro: Negative for HA, weakness or paresthesia  Psych: Negative for depression or anxiety     Vitals:    11/11/22 1003 11/11/22 1017   BP: (!) 132/91 (!) 137/90   Pulse: 97    Resp: 18    Temp: 98.9 °F (37.2 °C)    TempSrc: Oral    SpO2: 96%    Weight: 269 lb (122 kg)    Height: 5' 10\" (1.778 m)        Physical Examination:  General: Well developed, well nourished, in no acute distress  Head: Normocephalic, atraumatic  Eyes: Sclera clear, EOMI  Neck: Normal range of motion  Respiratory: symmetrical, unlabored effort  Cardiovascular: Regular rate and rhythm  Extremities: Full range of motion, normal gait  Neurologic: No focal deficits  Psych: Active, alert and oriented. Affect appropriate       ICD-10-CM ICD-9-CM    1. Primary hypertension  I10 401.9 amLODIPine (NORVASC) 10 mg tablet      lisinopriL (PRINIVIL, ZESTRIL) 10 mg tablet      2. Erectile dysfunction, unspecified erectile dysfunction type  N52.9 607.84 sildenafil citrate (VIAGRA) 100 mg tablet      3. Obesity (BMI 35.0-39.9 without comorbidity)  E66.9 278.00           Plan of care:  Diagnoses were discussed in detail with patient. Medications reviewed and appropriate. Patient to continue current prescribed medications as written. Medication risks/benefits/side effects discussed with patient. All of the patient's questions were addressed and answered to apparent satisfaction. The patient understands and agrees with our plan of care. The patient knows to call back if they have questions about the plan of care or if symptoms change. The patient received an After-Visit Summary which contains VS, diagnoses, orders, allergy and medication lists. Future Appointments   Date Time Provider Raza Dash   5/26/2023  8:00 AM Wily Burton MD [facility]           Follow-up and Dispositions    Return in about 6 months (around 5/11/2023).

## 2023-02-25 DIAGNOSIS — I10 PRIMARY HYPERTENSION: ICD-10-CM

## 2023-02-25 RX ORDER — LISINOPRIL 10 MG/1
10 TABLET ORAL DAILY
Qty: 90 TABLET | Refills: 1 | Status: CANCELLED | OUTPATIENT
Start: 2023-02-25

## 2023-02-25 RX ORDER — AMLODIPINE BESYLATE 10 MG/1
10 TABLET ORAL DAILY
Qty: 90 TABLET | Refills: 1 | Status: CANCELLED | OUTPATIENT
Start: 2023-02-25

## 2023-05-26 ENCOUNTER — TELEPHONE (OUTPATIENT)
Facility: CLINIC | Age: 53
End: 2023-05-26

## 2023-05-26 ENCOUNTER — OFFICE VISIT (OUTPATIENT)
Facility: CLINIC | Age: 53
End: 2023-05-26
Payer: COMMERCIAL

## 2023-05-26 VITALS
TEMPERATURE: 97.4 F | BODY MASS INDEX: 37.82 KG/M2 | HEART RATE: 57 BPM | WEIGHT: 264.2 LBS | DIASTOLIC BLOOD PRESSURE: 86 MMHG | HEIGHT: 70 IN | SYSTOLIC BLOOD PRESSURE: 120 MMHG | OXYGEN SATURATION: 99 % | RESPIRATION RATE: 18 BRPM

## 2023-05-26 DIAGNOSIS — R35.1 NOCTURIA: ICD-10-CM

## 2023-05-26 DIAGNOSIS — I10 ESSENTIAL (PRIMARY) HYPERTENSION: Primary | ICD-10-CM

## 2023-05-26 DIAGNOSIS — N52.9 ERECTILE DYSFUNCTION, UNSPECIFIED ERECTILE DYSFUNCTION TYPE: ICD-10-CM

## 2023-05-26 DIAGNOSIS — Z12.11 ENCOUNTER FOR SCREENING FOR MALIGNANT NEOPLASM OF COLON: ICD-10-CM

## 2023-05-26 PROCEDURE — 3078F DIAST BP <80 MM HG: CPT | Performed by: FAMILY MEDICINE

## 2023-05-26 PROCEDURE — 99214 OFFICE O/P EST MOD 30 MIN: CPT | Performed by: FAMILY MEDICINE

## 2023-05-26 PROCEDURE — 3074F SYST BP LT 130 MM HG: CPT | Performed by: FAMILY MEDICINE

## 2023-05-26 RX ORDER — LISINOPRIL 10 MG/1
10 TABLET ORAL DAILY
COMMUNITY
End: 2023-06-02

## 2023-05-26 SDOH — ECONOMIC STABILITY: FOOD INSECURITY: WITHIN THE PAST 12 MONTHS, THE FOOD YOU BOUGHT JUST DIDN'T LAST AND YOU DIDN'T HAVE MONEY TO GET MORE.: NEVER TRUE

## 2023-05-26 SDOH — ECONOMIC STABILITY: INCOME INSECURITY: HOW HARD IS IT FOR YOU TO PAY FOR THE VERY BASICS LIKE FOOD, HOUSING, MEDICAL CARE, AND HEATING?: NOT HARD AT ALL

## 2023-05-26 SDOH — ECONOMIC STABILITY: HOUSING INSECURITY
IN THE LAST 12 MONTHS, WAS THERE A TIME WHEN YOU DID NOT HAVE A STEADY PLACE TO SLEEP OR SLEPT IN A SHELTER (INCLUDING NOW)?: NO

## 2023-05-26 SDOH — ECONOMIC STABILITY: FOOD INSECURITY: WITHIN THE PAST 12 MONTHS, YOU WORRIED THAT YOUR FOOD WOULD RUN OUT BEFORE YOU GOT MONEY TO BUY MORE.: NEVER TRUE

## 2023-05-26 ASSESSMENT — PATIENT HEALTH QUESTIONNAIRE - PHQ9
SUM OF ALL RESPONSES TO PHQ9 QUESTIONS 1 & 2: 0
SUM OF ALL RESPONSES TO PHQ QUESTIONS 1-9: 0
2. FEELING DOWN, DEPRESSED OR HOPELESS: 0
SUM OF ALL RESPONSES TO PHQ QUESTIONS 1-9: 0
SUM OF ALL RESPONSES TO PHQ QUESTIONS 1-9: 0
1. LITTLE INTEREST OR PLEASURE IN DOING THINGS: 0
SUM OF ALL RESPONSES TO PHQ QUESTIONS 1-9: 0

## 2023-05-28 LAB
ALBUMIN SERPL-MCNC: 4.4 G/DL (ref 3.8–4.9)
ALBUMIN/GLOB SERPL: 1.9 {RATIO} (ref 1.2–2.2)
ALP SERPL-CCNC: 70 IU/L (ref 44–121)
ALT SERPL-CCNC: 21 IU/L (ref 0–44)
AST SERPL-CCNC: 20 IU/L (ref 0–40)
BILIRUB SERPL-MCNC: 0.7 MG/DL (ref 0–1.2)
BUN SERPL-MCNC: 9 MG/DL (ref 6–24)
BUN/CREAT SERPL: 7 (ref 9–20)
CALCIUM SERPL-MCNC: 9.4 MG/DL (ref 8.7–10.2)
CHLORIDE SERPL-SCNC: 101 MMOL/L (ref 96–106)
CHOLEST SERPL-MCNC: 97 MG/DL (ref 100–199)
CO2 SERPL-SCNC: 21 MMOL/L (ref 20–29)
CREAT SERPL-MCNC: 1.26 MG/DL (ref 0.76–1.27)
EGFRCR SERPLBLD CKD-EPI 2021: 68 ML/MIN/1.73
GLOBULIN SER CALC-MCNC: 2.3 G/DL (ref 1.5–4.5)
GLUCOSE SERPL-MCNC: 84 MG/DL (ref 70–99)
HCT VFR BLD AUTO: 48.2 % (ref 37.5–51)
HDLC SERPL-MCNC: 25 MG/DL
HGB BLD-MCNC: 15.9 G/DL (ref 13–17.7)
LDLC SERPL CALC-MCNC: 45 MG/DL (ref 0–99)
POTASSIUM SERPL-SCNC: 5 MMOL/L (ref 3.5–5.2)
PROT SERPL-MCNC: 6.7 G/DL (ref 6–8.5)
SODIUM SERPL-SCNC: 138 MMOL/L (ref 134–144)
TRIGL SERPL-MCNC: 161 MG/DL (ref 0–149)
TSH SERPL DL<=0.005 MIU/L-ACNC: 1.43 UIU/ML (ref 0.45–4.5)
VLDLC SERPL CALC-MCNC: 27 MG/DL (ref 5–40)

## 2023-05-30 DIAGNOSIS — I10 ESSENTIAL (PRIMARY) HYPERTENSION: ICD-10-CM

## 2023-05-30 NOTE — PROGRESS NOTES
Patient: Wing Anne MRN: [id number]  SSN: [id number]    YOB: 1970  Age: 47 y.o. Sex: male      Chief Complaint   Patient presents with    Follow-up Chronic Condition       he is a 47y.o. year old male who presents for follow up of chronic health conditions. Medical history is significant for HTN, nocturia and ED. Patient denies HA, dizziness, SOB, CP, abdominal pain, dysuria, acute myalgias or arthralgias. Encounter Diagnoses   Name Primary? Essential (primary) hypertension Yes    Erectile dysfunction, unspecified erectile dysfunction type     Nocturia     Encounter for screening for malignant neoplasm of colon        BP Readings from Last 3 Encounters:   05/26/23 120/86   11/11/22 (!) 137/90   10/11/22 (!) 135/93     Wt Readings from Last 3 Encounters:   05/26/23 264 lb 3.2 oz (119.8 kg)   11/11/22 269 lb (122 kg)   10/11/22 263 lb (119.3 kg)     Body mass index is 37.91 kg/m². CMP:    Lab Results   Component Value Date/Time     05/26/2023 08:46 AM    K 5.0 05/26/2023 08:46 AM     05/26/2023 08:46 AM    CO2 21 05/26/2023 08:46 AM    BUN 9 05/26/2023 08:46 AM    CREATININE 1.26 05/26/2023 08:46 AM    GFRAA 58 01/11/2022 12:00 AM    AGRATIO 1.9 05/26/2023 08:46 AM    LABGLOM 68 05/26/2023 08:46 AM    GLUCOSE 84 05/26/2023 08:46 AM    PROT 6.7 05/26/2023 08:46 AM    LABALBU 4.4 05/26/2023 08:46 AM    CALCIUM 9.4 05/26/2023 08:46 AM    BILITOT 0.7 05/26/2023 08:46 AM    ALKPHOS 70 05/26/2023 08:46 AM    AST 20 05/26/2023 08:46 AM    ALT 21 05/26/2023 08:46 AM     FLP:    Lab Results   Component Value Date/Time    TRIG 161 05/26/2023 08:46 AM    HDL 25 05/26/2023 08:46 AM    LDLCALC 45 05/26/2023 08:46 AM    LABVLDL 27 05/26/2023 08:46 AM     TSH:    Lab Results   Component Value Date/Time    TSH 1.430 05/26/2023 08:46 AM       Patient Active Problem List   Diagnosis    Primary hypertension     No past surgical history on file.   Social History     Socioeconomic History

## 2023-06-02 RX ORDER — AMLODIPINE BESYLATE 10 MG/1
TABLET ORAL
Qty: 90 TABLET | Refills: 1 | Status: SHIPPED | OUTPATIENT
Start: 2023-06-02

## 2023-06-02 RX ORDER — LISINOPRIL 10 MG/1
TABLET ORAL
Qty: 90 TABLET | Refills: 1 | Status: SHIPPED | OUTPATIENT
Start: 2023-06-02

## 2023-09-22 LAB — HEMOCCULT STL QL IA: POSITIVE

## 2023-09-29 ENCOUNTER — TELEPHONE (OUTPATIENT)
Facility: CLINIC | Age: 53
End: 2023-09-29

## 2023-10-17 DIAGNOSIS — I10 ESSENTIAL (PRIMARY) HYPERTENSION: ICD-10-CM

## 2023-10-17 RX ORDER — LISINOPRIL 10 MG/1
10 TABLET ORAL DAILY
Qty: 90 TABLET | Refills: 1 | Status: SHIPPED | OUTPATIENT
Start: 2023-10-17

## 2024-03-12 NOTE — PROGRESS NOTES
"Chief Complaint  Chief Complaint   Patient presents with    Establish Care    Hypertension        Subjective          Vance Pearl is here today to establish care. The following problems were discussed:     Family history: Mother- HTN     Social history: Denies smoking, drinking, or illegal drug use.     HTN- He is controlled on medication with 120/80's. He reports he has some white coat syndrome. Compliant on medication. He has been out of medication due to job change. Denies CP, SOA, Dizziness, lightheadedness, or HA.     Obesity- He exercises 3 times a week. He tries to east healthy, is over eating, too much junk food and note enough healthy food.       He is from here and recently moved back from [place]   Previous PCP was doctor in [place]   Marital status-   Children- Yes  Works as [employer]   Exercise- regularly 3 times weekly  Diet- Overeating, Eating too much junk food, and Not eating enough healthy food               Review of Systems   Constitutional:  Negative for chills and fever.   HENT:  Negative for congestion.    Respiratory:  Negative for shortness of breath.    Cardiovascular:  Negative for chest pain.   Gastrointestinal:  Negative for abdominal pain, nausea and vomiting.   Genitourinary:  Negative for difficulty urinating.   Musculoskeletal:  Negative for myalgias.   Skin: Negative.    Neurological:  Negative for dizziness, light-headedness and headache.   Psychiatric/Behavioral:  Negative for depressed mood. The patient is not nervous/anxious.         Objective   Vital Signs:   Vitals:    03/13/24 1057   BP: (!) 159/119   Pulse:    Temp:    SpO2:       Estimated body mass index is 38.17 kg/m² as calculated from the following:    Height as of this encounter: 177.8 cm (70\").    Weight as of this encounter: 121 kg (266 lb).    Class 2 Severe Obesity (BMI >=35 and <=39.9). Obesity-related health conditions include the following: hypertension. Obesity is unchanged. BMI is is above " average; BMI management plan is completed. We discussed portion control and increasing exercise.            Patient screened negative for depression based on a PHQ-9 score of 0 on 3/13/2024. Follow-up recommendations include: PCP managing depression.        Physical Exam  Vitals reviewed.   Constitutional:       Appearance: Normal appearance. He is obese.   HENT:      Head: Normocephalic and atraumatic.      Nose: Nose normal.      Mouth/Throat:      Mouth: Mucous membranes are moist.      Pharynx: Oropharynx is clear.   Eyes:      Extraocular Movements: Extraocular movements intact.      Conjunctiva/sclera: Conjunctivae normal.   Cardiovascular:      Rate and Rhythm: Normal rate and regular rhythm.      Pulses: Normal pulses.      Heart sounds: Normal heart sounds.      Comments: S1, S2 audible  Pulmonary:      Effort: Pulmonary effort is normal.      Breath sounds: Normal breath sounds.      Comments: On room air   Abdominal:      General: Abdomen is flat.   Musculoskeletal:         General: Normal range of motion.      Cervical back: Normal range of motion.   Skin:     General: Skin is warm and dry.   Neurological:      General: No focal deficit present.      Mental Status: He is alert and oriented to person, place, and time. Mental status is at baseline.   Psychiatric:         Mood and Affect: Mood normal.         Behavior: Behavior normal.         Thought Content: Thought content normal.         Judgment: Judgment normal.                Physical Exam   Result Review :             Procedures       Assessment and Plan     Diagnoses and all orders for this visit:    1. Encounter to establish care (Primary)    2. Primary hypertension  Assessment & Plan:  BP: 164/122, 159/119    On lisinopril     HTN- He is controlled on medication with 120/80's. He reports he has some white coat syndrome. Compliant on medication. He has been out of medication due to job change. Denies CP, SOA, Dizziness, lightheadedness, or HA.      Re-filled lisinopril    Keep BP log and [facility]       Other orders  -     lisinopril (PRINIVIL,ZESTRIL) 10 MG tablet; Take 1 tablet by mouth Daily.  Dispense: 90 tablet; Refill: 1              Follow Up   Return for Annual physical.   Patient was given instructions and counseling regarding her condition or for health maintenance advice. Please see specific information pulled into the AVS if appropriate.

## 2024-03-13 ENCOUNTER — OFFICE VISIT (OUTPATIENT)
Dept: FAMILY MEDICINE CLINIC | Facility: CLINIC | Age: 54
End: 2024-03-13
Payer: COMMERCIAL

## 2024-03-13 VITALS
HEART RATE: 67 BPM | HEIGHT: 70 IN | OXYGEN SATURATION: 99 % | WEIGHT: 266 LBS | BODY MASS INDEX: 38.08 KG/M2 | SYSTOLIC BLOOD PRESSURE: 159 MMHG | DIASTOLIC BLOOD PRESSURE: 119 MMHG | TEMPERATURE: 97.8 F

## 2024-03-13 DIAGNOSIS — I10 PRIMARY HYPERTENSION: ICD-10-CM

## 2024-03-13 DIAGNOSIS — Z76.89 ENCOUNTER TO ESTABLISH CARE: Primary | ICD-10-CM

## 2024-03-13 PROBLEM — Z00.00 ENCOUNTER FOR ANNUAL PHYSICAL EXAM: Status: ACTIVE | Noted: 2024-03-13

## 2024-03-13 PROBLEM — Z00.00 ENCOUNTER FOR ANNUAL PHYSICAL EXAM: Status: RESOLVED | Noted: 2024-03-13 | Resolved: 2024-03-13

## 2024-03-13 PROBLEM — E66.9 OBESITY (BMI 30-39.9): Status: ACTIVE | Noted: 2024-03-13

## 2024-03-13 RX ORDER — LISINOPRIL 10 MG/1
1 TABLET ORAL DAILY
COMMUNITY
Start: 2023-10-17 | End: 2024-03-13 | Stop reason: SDUPTHER

## 2024-03-13 RX ORDER — LISINOPRIL 10 MG/1
10 TABLET ORAL DAILY
Qty: 90 TABLET | Refills: 1 | Status: SHIPPED | OUTPATIENT
Start: 2024-03-13

## 2024-03-13 NOTE — ASSESSMENT & PLAN NOTE
Family history: Mother- HTN     Social history: Denies smoking, drinking, or illegal drug use.     Labs- Due   Colonoscopy-Has done cologuard in the past, Due   PSA- Due     Vaccines:  Flu-Refused   Shingles- Due   Covid-19- Received some doses, Due     Dental exam- UTD   Eye exam-UTD     Encourage healthy diet and exercise   Encourage monthly self testicular exams   Check labs   Encourage shingles and COVID vaccine

## 2024-03-13 NOTE — ASSESSMENT & PLAN NOTE
Patient's (Body mass index is 38.17 kg/m².)     Obesity- He exercises 3 times a week. He tries to east healthy, is over eating, too much junk food and note enough healthy food.     Encourage healthy diet and exercise

## 2024-03-13 NOTE — ASSESSMENT & PLAN NOTE
BP: 164/122, 159/119    On lisinopril     HTN- He is controlled on medication with 120/80's. He reports he has some white coat syndrome. Compliant on medication. He has been out of medication due to job change. Denies CP, SOA, Dizziness, lightheadedness, or HA.     Re-filled lisinopril    Keep BP log and send me the results in mychart of a 7 days snapshot

## 2024-03-13 NOTE — PATIENT INSTRUCTIONS
Encourage healthy diet and exercise   Re-filled lisinopril   Keep BP log and send me the results in mychart of a 7 days snapshot

## 2024-08-28 NOTE — PROGRESS NOTES
"Chief Complaint  Chief Complaint   Patient presents with    Annual Exam        Subjective          Vance Pearl Jr. is a 53-year-old male who reports to the office today for annual physical.    Annual physical-Denies any new family history. Family history: Mother- HTN Social history: Denies smoking, drinking, or illegal drug use.      HTN- He is has been checking his BP at home periodically and has been ok. Compliant on medication. He has been out of medication due to job change. Denies CP, SOA, Dizziness, lightheadedness, or HA.      Obesity- He exercises 3-4 times a week. He has been trying to eat healthy.           Labs- Due   Colonoscopy-Has done cologuard in the past, Due   PSA- Due     Vaccines:  Flu-Due  Shingles- Due   Covid-19- Received some doses, Due     Dental exam- UTD   Eye exam-UTD          Review of Systems   Constitutional:  Negative for chills and fever.   HENT:  Negative for congestion.    Eyes: Negative.    Respiratory:  Negative for shortness of breath.    Cardiovascular:  Negative for chest pain.   Gastrointestinal:  Negative for abdominal pain, nausea and vomiting.   Genitourinary:  Negative for difficulty urinating.   Musculoskeletal:  Negative for myalgias.   Skin: Negative.    Neurological:  Negative for dizziness, light-headedness and headache.   Psychiatric/Behavioral:  Negative for self-injury, suicidal ideas and depressed mood. The patient is not nervous/anxious.         Objective   Vital Signs:   Vitals:    09/03/24 0816   BP: 137/84   Pulse: 71   Resp: 16   Temp: 98.4 °F (36.9 °C)   SpO2: 97%      Estimated body mass index is 37.74 kg/m² as calculated from the following:    Height as of this encounter: 177.8 cm (70\").    Weight as of this encounter: 119 kg (263 lb).                  Patient screened negative for depression based on a PHQ-9 score of 0 on 3/13/2024. Follow-up recommendations include: PCP managing depression.        Physical Exam  Vitals reviewed.   Constitutional:     "   Appearance: Normal appearance. He is obese.   HENT:      Head: Normocephalic and atraumatic.      Right Ear: Tympanic membrane, ear canal and external ear normal.      Left Ear: Tympanic membrane, ear canal and external ear normal.      Nose: Nose normal.      Mouth/Throat:      Mouth: Mucous membranes are moist.      Pharynx: Oropharynx is clear.   Eyes:      Extraocular Movements: Extraocular movements intact.      Conjunctiva/sclera: Conjunctivae normal.      Pupils: Pupils are equal, round, and reactive to light.   Cardiovascular:      Rate and Rhythm: Normal rate and regular rhythm.      Pulses: Normal pulses.      Heart sounds: Normal heart sounds.      Comments: S1, S2 audible  Pulmonary:      Effort: Pulmonary effort is normal.      Breath sounds: Normal breath sounds.      Comments: On room air   Abdominal:      General: Abdomen is flat. Bowel sounds are normal.      Palpations: Abdomen is soft.   Musculoskeletal:         General: Normal range of motion.      Cervical back: Normal range of motion and neck supple.   Skin:     General: Skin is warm and dry.   Neurological:      General: No focal deficit present.      Mental Status: He is alert and oriented to person, place, and time. Mental status is at baseline.   Psychiatric:         Mood and Affect: Mood normal.         Behavior: Behavior normal.         Thought Content: Thought content normal.         Judgment: Judgment normal.                Physical Exam   Result Review :             Procedures       Assessment and Plan     Diagnoses and all orders for this visit:    1. Colon cancer screening (Primary)  -     Cologuard - Stool, Per Rectum; Future    2. Obesity (BMI 30-39.9)  Assessment & Plan:  Patient's (Body mass index is 37.74 kg/m².)       Obesity- He exercises 3-4 times a week. He has been trying to eat healthy.     Continue healthy diet and exercise       3. Primary hypertension  Assessment & Plan:  BP: 137/84    On lisinopril- monitor kidney  function     HTN- He is has been checking his BP at home periodically and has been ok. Compliant on medication. He has been out of medication due to job change. Denies CP, SOA, Dizziness, lightheadedness, or HA.       4. Encounter for annual physical exam  Assessment & Plan:  Annual physical-Denies any new family history. Family history: Mother- HTN Social history: Denies smoking, drinking, or illegal drug use.     Labs- Due   Colonoscopy-Has done cologuard in the past, Due   PSA- Due     Vaccines:  Flu-Due  Shingles- Due   Covid-19- Received some doses, Due     Dental exam- UTD   Eye exam-UTD     Encourage healthy diet and exercise   Encouraged monthly self testicular exams  Check labs  Encourage flu, COVID, and shingles vaccine    Orders:  -     Comprehensive Metabolic Panel  -     Hemoglobin A1c  -     Lipid Panel    5. Screening PSA (prostate specific antigen)  -     PSA Screen              Follow Up   Return in about 1 year (around 9/3/2025) for Annual physical.   Patient was given instructions and counseling regarding her condition or for health maintenance advice. Please see specific information pulled into the AVS if appropriate.

## 2024-09-03 ENCOUNTER — OFFICE VISIT (OUTPATIENT)
Dept: FAMILY MEDICINE CLINIC | Facility: CLINIC | Age: 54
End: 2024-09-03
Payer: COMMERCIAL

## 2024-09-03 VITALS
TEMPERATURE: 98.4 F | SYSTOLIC BLOOD PRESSURE: 137 MMHG | WEIGHT: 263 LBS | BODY MASS INDEX: 37.65 KG/M2 | HEIGHT: 70 IN | OXYGEN SATURATION: 97 % | DIASTOLIC BLOOD PRESSURE: 84 MMHG | HEART RATE: 71 BPM | RESPIRATION RATE: 16 BRPM

## 2024-09-03 DIAGNOSIS — Z12.11 COLON CANCER SCREENING: Primary | ICD-10-CM

## 2024-09-03 DIAGNOSIS — Z12.5 SCREENING PSA (PROSTATE SPECIFIC ANTIGEN): ICD-10-CM

## 2024-09-03 DIAGNOSIS — I10 PRIMARY HYPERTENSION: ICD-10-CM

## 2024-09-03 DIAGNOSIS — Z00.00 ENCOUNTER FOR ANNUAL PHYSICAL EXAM: ICD-10-CM

## 2024-09-03 DIAGNOSIS — E66.9 OBESITY (BMI 30-39.9): ICD-10-CM

## 2024-09-03 PROBLEM — Z76.89 ENCOUNTER TO ESTABLISH CARE: Status: RESOLVED | Noted: 2024-03-13 | Resolved: 2024-09-03

## 2024-09-03 PROCEDURE — 99396 PREV VISIT EST AGE 40-64: CPT | Performed by: NURSE PRACTITIONER

## 2024-09-03 PROCEDURE — 80053 COMPREHEN METABOLIC PANEL: CPT | Performed by: NURSE PRACTITIONER

## 2024-09-03 PROCEDURE — G0103 PSA SCREENING: HCPCS | Performed by: NURSE PRACTITIONER

## 2024-09-03 PROCEDURE — 83036 HEMOGLOBIN GLYCOSYLATED A1C: CPT | Performed by: NURSE PRACTITIONER

## 2024-09-03 PROCEDURE — 80061 LIPID PANEL: CPT | Performed by: NURSE PRACTITIONER

## 2024-09-03 NOTE — ASSESSMENT & PLAN NOTE
BP: 137/84    On lisinopril- monitor kidney function     HTN- He is has been checking his BP at home periodically and has been ok. Compliant on medication. He has been out of medication due to job change. Denies CP, SOA, Dizziness, lightheadedness, or HA.

## 2024-09-03 NOTE — ASSESSMENT & PLAN NOTE
Patient's (Body mass index is 37.74 kg/m².)       Obesity- He exercises 3-4 times a week. He has been trying to eat healthy.     Continue healthy diet and exercise

## 2024-09-03 NOTE — ASSESSMENT & PLAN NOTE
Annual physical-Denies any new family history. Family history: Mother- HTN Social history: Denies smoking, drinking, or illegal drug use.     Labs- Due   Colonoscopy-Has done cologuard in the past, Due   PSA- Due     Vaccines:  Flu-Due  Shingles- Due   Covid-19- Received some doses, Due     Dental exam- UTD   Eye exam-UTD     Encourage healthy diet and exercise   Encouraged monthly self testicular exams  Check labs  Encourage flu, COVID, and shingles vaccine

## 2024-09-04 LAB
ALBUMIN SERPL-MCNC: 4.2 G/DL (ref 3.5–5.2)
ALBUMIN/GLOB SERPL: 1.8 G/DL
ALP SERPL-CCNC: 72 U/L (ref 39–117)
ALT SERPL W P-5'-P-CCNC: 18 U/L (ref 1–41)
ANION GAP SERPL CALCULATED.3IONS-SCNC: 9 MMOL/L (ref 5–15)
AST SERPL-CCNC: 14 U/L (ref 1–40)
BILIRUB SERPL-MCNC: 0.5 MG/DL (ref 0–1.2)
BUN SERPL-MCNC: 12 MG/DL (ref 6–20)
BUN/CREAT SERPL: 9.7 (ref 7–25)
CALCIUM SPEC-SCNC: 9.4 MG/DL (ref 8.6–10.5)
CHLORIDE SERPL-SCNC: 103 MMOL/L (ref 98–107)
CHOLEST SERPL-MCNC: 128 MG/DL (ref 0–200)
CO2 SERPL-SCNC: 26 MMOL/L (ref 22–29)
CREAT SERPL-MCNC: 1.24 MG/DL (ref 0.76–1.27)
EGFRCR SERPLBLD CKD-EPI 2021: 69.1 ML/MIN/1.73
GLOBULIN UR ELPH-MCNC: 2.4 GM/DL
GLUCOSE SERPL-MCNC: 85 MG/DL (ref 65–99)
HBA1C MFR BLD: 5.3 % (ref 4.8–5.6)
HDLC SERPL-MCNC: 32 MG/DL (ref 40–60)
LDLC SERPL CALC-MCNC: 76 MG/DL (ref 0–100)
LDLC/HDLC SERPL: 2.34 {RATIO}
POTASSIUM SERPL-SCNC: 4.8 MMOL/L (ref 3.5–5.2)
PROT SERPL-MCNC: 6.6 G/DL (ref 6–8.5)
PSA SERPL-MCNC: 1.71 NG/ML (ref 0–4)
SODIUM SERPL-SCNC: 138 MMOL/L (ref 136–145)
TRIGL SERPL-MCNC: 106 MG/DL (ref 0–150)
VLDLC SERPL-MCNC: 20 MG/DL (ref 5–40)

## 2024-09-16 RX ORDER — LISINOPRIL 10 MG/1
10 TABLET ORAL DAILY
Qty: 90 TABLET | Refills: 1 | Status: SHIPPED | OUTPATIENT
Start: 2024-09-16

## 2024-11-04 DIAGNOSIS — R19.5 POSITIVE COLORECTAL CANCER SCREENING USING COLOGUARD TEST: Primary | ICD-10-CM

## 2024-12-26 RX ORDER — LISINOPRIL 10 MG/1
10 TABLET ORAL DAILY
Qty: 90 TABLET | Refills: 1 | Status: SHIPPED | OUTPATIENT
Start: 2024-12-26

## 2025-01-03 ENCOUNTER — TELEPHONE (OUTPATIENT)
Dept: FAMILY MEDICINE CLINIC | Facility: CLINIC | Age: 55
End: 2025-01-03
Payer: COMMERCIAL

## 2025-01-22 ENCOUNTER — PREP FOR SURGERY (OUTPATIENT)
Dept: OTHER | Facility: HOSPITAL | Age: 55
End: 2025-01-22
Payer: COMMERCIAL

## 2025-01-22 DIAGNOSIS — Z12.11 COLON CANCER SCREENING: Primary | ICD-10-CM

## 2025-01-22 DIAGNOSIS — R19.5 POSITIVE COLORECTAL CANCER SCREENING USING COLOGUARD TEST: Primary | ICD-10-CM

## 2025-01-22 RX ORDER — SODIUM, POTASSIUM,MAG SULFATES 17.5-3.13G
SOLUTION, RECONSTITUTED, ORAL ORAL
Qty: 177 ML | Refills: 0 | Status: SHIPPED | OUTPATIENT
Start: 2025-01-22

## 2025-03-09 ENCOUNTER — ANESTHESIA EVENT (OUTPATIENT)
Dept: GASTROENTEROLOGY | Facility: HOSPITAL | Age: 55
End: 2025-03-09
Payer: COMMERCIAL

## 2025-03-10 ENCOUNTER — HOSPITAL ENCOUNTER (OUTPATIENT)
Facility: HOSPITAL | Age: 55
Setting detail: HOSPITAL OUTPATIENT SURGERY
Discharge: HOME | End: 2025-03-10
Attending: STUDENT IN AN ORGANIZED HEALTH CARE EDUCATION/TRAINING PROGRAM | Admitting: STUDENT IN AN ORGANIZED HEALTH CARE EDUCATION/TRAINING PROGRAM
Payer: COMMERCIAL

## 2025-03-10 ENCOUNTER — ANESTHESIA (OUTPATIENT)
Dept: GASTROENTEROLOGY | Facility: HOSPITAL | Age: 55
End: 2025-03-10
Payer: COMMERCIAL

## 2025-03-10 ENCOUNTER — TELEPHONE (OUTPATIENT)
Age: 55
End: 2025-03-10

## 2025-03-10 VITALS
OXYGEN SATURATION: 96 % | TEMPERATURE: 98.2 F | DIASTOLIC BLOOD PRESSURE: 94 MMHG | SYSTOLIC BLOOD PRESSURE: 127 MMHG | BODY MASS INDEX: 37.5 KG/M2 | RESPIRATION RATE: 16 BRPM | WEIGHT: 261.91 LBS | HEART RATE: 78 BPM | HEIGHT: 70 IN

## 2025-03-10 DIAGNOSIS — R19.5 POSITIVE COLORECTAL CANCER SCREENING USING COLOGUARD TEST: ICD-10-CM

## 2025-03-10 DIAGNOSIS — C18.7 CANCER OF SIGMOID: Primary | ICD-10-CM

## 2025-03-10 PROCEDURE — 25010000002 PROPOFOL 10 MG/ML EMULSION

## 2025-03-10 PROCEDURE — 45381 COLONOSCOPY SUBMUCOUS NJX: CPT | Performed by: STUDENT IN AN ORGANIZED HEALTH CARE EDUCATION/TRAINING PROGRAM

## 2025-03-10 PROCEDURE — 88342 IMHCHEM/IMCYTCHM 1ST ANTB: CPT | Performed by: STUDENT IN AN ORGANIZED HEALTH CARE EDUCATION/TRAINING PROGRAM

## 2025-03-10 PROCEDURE — 25010000002 LIDOCAINE PF 2% 2 % SOLUTION

## 2025-03-10 PROCEDURE — 25810000003 SODIUM CHLORIDE 0.9 % SOLUTION

## 2025-03-10 PROCEDURE — 45380 COLONOSCOPY AND BIOPSY: CPT | Performed by: STUDENT IN AN ORGANIZED HEALTH CARE EDUCATION/TRAINING PROGRAM

## 2025-03-10 PROCEDURE — 88305 TISSUE EXAM BY PATHOLOGIST: CPT | Performed by: STUDENT IN AN ORGANIZED HEALTH CARE EDUCATION/TRAINING PROGRAM

## 2025-03-10 PROCEDURE — 45385 COLONOSCOPY W/LESION REMOVAL: CPT | Performed by: STUDENT IN AN ORGANIZED HEALTH CARE EDUCATION/TRAINING PROGRAM

## 2025-03-10 RX ORDER — PROPOFOL 10 MG/ML
VIAL (ML) INTRAVENOUS AS NEEDED
Status: DISCONTINUED | OUTPATIENT
Start: 2025-03-10 | End: 2025-03-10 | Stop reason: SURG

## 2025-03-10 RX ORDER — ONDANSETRON 2 MG/ML
4 INJECTION INTRAMUSCULAR; INTRAVENOUS ONCE AS NEEDED
Status: DISCONTINUED | OUTPATIENT
Start: 2025-03-10 | End: 2025-03-10 | Stop reason: HOSPADM

## 2025-03-10 RX ORDER — DIPHENHYDRAMINE HYDROCHLORIDE 50 MG/ML
12.5 INJECTION INTRAMUSCULAR; INTRAVENOUS
Status: DISCONTINUED | OUTPATIENT
Start: 2025-03-10 | End: 2025-03-10 | Stop reason: HOSPADM

## 2025-03-10 RX ORDER — LABETALOL HYDROCHLORIDE 5 MG/ML
5 INJECTION, SOLUTION INTRAVENOUS
Status: DISCONTINUED | OUTPATIENT
Start: 2025-03-10 | End: 2025-03-10 | Stop reason: HOSPADM

## 2025-03-10 RX ORDER — LIDOCAINE HYDROCHLORIDE 20 MG/ML
INJECTION, SOLUTION EPIDURAL; INFILTRATION; INTRACAUDAL; PERINEURAL AS NEEDED
Status: DISCONTINUED | OUTPATIENT
Start: 2025-03-10 | End: 2025-03-10 | Stop reason: SURG

## 2025-03-10 RX ORDER — SODIUM CHLORIDE 9 MG/ML
INJECTION, SOLUTION INTRAVENOUS CONTINUOUS PRN
Status: DISCONTINUED | OUTPATIENT
Start: 2025-03-10 | End: 2025-03-10 | Stop reason: SURG

## 2025-03-10 RX ORDER — IPRATROPIUM BROMIDE AND ALBUTEROL SULFATE 2.5; .5 MG/3ML; MG/3ML
3 SOLUTION RESPIRATORY (INHALATION) ONCE AS NEEDED
Status: DISCONTINUED | OUTPATIENT
Start: 2025-03-10 | End: 2025-03-10 | Stop reason: HOSPADM

## 2025-03-10 RX ORDER — HYDRALAZINE HYDROCHLORIDE 20 MG/ML
5 INJECTION INTRAMUSCULAR; INTRAVENOUS
Status: DISCONTINUED | OUTPATIENT
Start: 2025-03-10 | End: 2025-03-10 | Stop reason: HOSPADM

## 2025-03-10 RX ORDER — MEPERIDINE HYDROCHLORIDE 25 MG/ML
12.5 INJECTION INTRAMUSCULAR; INTRAVENOUS; SUBCUTANEOUS
Status: DISCONTINUED | OUTPATIENT
Start: 2025-03-10 | End: 2025-03-10 | Stop reason: HOSPADM

## 2025-03-10 RX ORDER — EPHEDRINE SULFATE 5 MG/ML
5 INJECTION INTRAVENOUS ONCE AS NEEDED
Status: DISCONTINUED | OUTPATIENT
Start: 2025-03-10 | End: 2025-03-10 | Stop reason: HOSPADM

## 2025-03-10 RX ADMIN — PROPOFOL 50 MG: 10 INJECTION, EMULSION INTRAVENOUS at 10:02

## 2025-03-10 RX ADMIN — PROPOFOL 50 MG: 10 INJECTION, EMULSION INTRAVENOUS at 09:58

## 2025-03-10 RX ADMIN — PROPOFOL 100 MG: 10 INJECTION, EMULSION INTRAVENOUS at 09:55

## 2025-03-10 RX ADMIN — LIDOCAINE HYDROCHLORIDE 30 MG: 20 INJECTION, SOLUTION EPIDURAL; INFILTRATION; INTRACAUDAL; PERINEURAL at 09:55

## 2025-03-10 RX ADMIN — PROPOFOL 60 MG: 10 INJECTION, EMULSION INTRAVENOUS at 10:22

## 2025-03-10 RX ADMIN — PROPOFOL 50 MG: 10 INJECTION, EMULSION INTRAVENOUS at 10:26

## 2025-03-10 RX ADMIN — PROPOFOL 50 MG: 10 INJECTION, EMULSION INTRAVENOUS at 10:08

## 2025-03-10 RX ADMIN — PROPOFOL 50 MG: 10 INJECTION, EMULSION INTRAVENOUS at 10:14

## 2025-03-10 RX ADMIN — PROPOFOL 40 MG: 10 INJECTION, EMULSION INTRAVENOUS at 10:05

## 2025-03-10 RX ADMIN — SODIUM CHLORIDE: 9 INJECTION, SOLUTION INTRAVENOUS at 09:51

## 2025-03-10 RX ADMIN — PROPOFOL 50 MG: 10 INJECTION, EMULSION INTRAVENOUS at 10:18

## 2025-03-10 RX ADMIN — PROPOFOL 50 MG: 10 INJECTION, EMULSION INTRAVENOUS at 10:11

## 2025-03-10 NOTE — TELEPHONE ENCOUNTER
Lvm for pt to let him know he is evangelina miranda on 3/17. He is needing labs and a ct completed before this appt. Needing to know his avali to get him evangelina for those tests.

## 2025-03-10 NOTE — ANESTHESIA PREPROCEDURE EVALUATION
Anesthesia Evaluation     Patient summary reviewed and Nursing notes reviewed   NPO Solid Status: > 8 hours  NPO Liquid Status: > 8 hours           Airway   Mallampati: I  TM distance: >3 FB  Neck ROM: full  No difficulty expected  Dental - normal exam     Pulmonary - normal exam   (+) ,sleep apnea  Cardiovascular - negative cardio ROS and normal exam  Exercise tolerance: unable to assess    ECG reviewed        Neuro/Psych- negative ROS  GI/Hepatic/Renal/Endo    (+) obesity    Musculoskeletal (-) negative ROS    Abdominal  - normal exam    Bowel sounds: normal.   Substance History - negative use     OB/GYN negative ob/gyn ROS         Other - negative ROS                   Anesthesia Plan    ASA 2     general     (Pos Cologuard)  intravenous induction     Anesthetic plan, risks, benefits, and alternatives have been provided, discussed and informed consent has been obtained with: patient.    CODE STATUS:

## 2025-03-10 NOTE — H&P
Colorectal Surgery Preop Note    ID:  Vance Pearl Jr.;     Chief Complaint  Positive cologuard    History of Present Illness  Vance Pearl Jr. is a 54 y.o. male who is here for diagnostic colonoscopy      Past Medical History  Past Medical History:   Diagnosis Date    GERD (gastroesophageal reflux disease)     Hypertension 2018     For further details please see prior notes and Health History Questionnaire scanned in Epic.  Past Surgical History  History reviewed. No pertinent surgical history.  For further details please see prior notes and Health History Questionnaire scanned in Epic.  Family History  History reviewed. No pertinent family history.  For further details please see prior notes and Health History Questionnaire scanned in Epic.  Social History  Social History     Tobacco Use    Smoking status: Never     Passive exposure: Never    Smokeless tobacco: Never   Vaping Use    Vaping status: Never Used   Substance Use Topics    Alcohol use: Never    Drug use: Never     For further details please see prior notes and Health History Questionnaire scanned in Epic.  Medication List  No current facility-administered medications for this encounter.  For further details please see prior notes and Health History Questionnaire scanned in Epic.  Allergies  No Known Allergies  Review of Systems  Pertinent positives noted in HPI.    Physical Exam  General:  No acute distress  Head: Normocephalic, atraumatic  CV: Regular rate, no edema, extremities warm and well perfused  Pulm: Symmetric chest rise, non-labored breathing  Neuro: Alert and oriented     Abdomen: Please see clinic note  Anorectal: Please see clinic note    Assessment  -Positive cologuard      Plan / Recommendations    1. Proceed to endo for diagnostic colonoscopy       Arturo Peoples MD  Colon and Rectal Surgery   [facility]

## 2025-03-10 NOTE — OP NOTE
[facility] Colorectal Surgery  Colonoscopy Examination     Name: Vance Pearl Jr.  : 1970  Date of Colonoscopy: 3/10/2025  Procedure: Diagnostic colonoscopy with positive Cologuard   Surgeon: Arturo Peoples MD   Anesthesia: Sedation   IV Fluids: refer to anesthesia record    Procedure Details:    Prior to the procedure, history and physical exam was performed. Patient's medication and allergies were reviewed. The risk and benefits of the procedure and sedation options and risks were discussed with the patient. All questions were answered and informed consent was obtained.    The patient was brought to the endoscopy suite and placed in the left lateral decubitus position. Conscious sedation was administered by the anesthesia team. Vital signs including blood pressure, heart rate, and oxygen saturation were monitored continuously throughout the procedure.    The colonoscope was introduced through the rectum and advanced through the entire colon under direct visualization. The scope was maneuvered carefully, and the mucosa of the rectum, sigmoid colon, descending colon, transverse colon, ascending colon, and cecum were thoroughly inspected. Adequate insufflation was maintained throughout the procedure for optimal visualization.    Findings:    Rectum: 30 mm polyp, pedunculated, removed with hot snare   Sigmoid colon: circumferential, partially obstructing, infiltrative highly suspicious for malignant mass. The mass extended for over 4 cm. Multiple biopsies were taken and tattoo was injected just distal from lesion. A rigid proctoscopy showed mass at 20 cm from anal verge.  Another pedunculated polyp measuring 30 mm was removed with hot snare. There was submucosal fibrosis.  Tattoo was injected just proximal from the polypectomy site.                                            Descending colon: Normal appearance with no lesions,polyps, or abnormalities.  Transverse colon: 10 mm pedunculated polyp removed with cold  snare.   Ascending colon: Normal appearance with no lesions,polyps, or abnormalities.  Cecum: Normal appearance with no lesions,polyps, or abnormalities.    Procedure Images:            Interventions:  Transverse colon ( 10 mm, pedunculated ) cold snare   Sigmoid colon ( 30 mm, pedunculated) hot snare, tattoo proximal   Sigmoid mass ( circumferential, partially obstructing, infiltrative) cold biopsy, distal tattoo  Rectum (30 mm, pedunculated) hot snare.     Specimens:  The removed polyps were retrieved and sent for histopathological examination to the pathology department for further analysis.    Recommendation:     Obtain CT chest, abdomen and pelvis   Obtain CEA  Follow up pathology   Follow up in clinic for further discussion and intervention   Repeat colonoscopy in 1 year     Conclusion:  The diagnostic colonoscopy was completed successfully, and the entire colon was visualized without any complications. The patient tolerated the procedure well and was transferred to the recovery area in stable condition. Post-procedure instructions and follow-up were discussed with the patient and will be provided in written form.    Arturo Peoples MD  Colon and Rectal Surgery   [facility] [place] [place], [address]  T: [phone]

## 2025-03-10 NOTE — ANESTHESIA POSTPROCEDURE EVALUATION
Patient: Vance Pearl Jr.    Procedure Summary       Date: 03/10/25 Room / Location: [facility] ENDOSCOPY 4 / [facility] ENDOSCOPY    Anesthesia Start: 0951 Anesthesia Stop: 1040    Procedure: COLONOSCOPY with POLYPECTOMY, SCLEROTHERAPY with INK TATOO, & BIOPSY (Rectum) Diagnosis:       Positive colorectal cancer screening using Cologuard test      (Positive colorectal cancer screening using Cologuard test [R19.5])    Surgeons: Arturo Peoples MD Provider: Ajit Storey MD    Anesthesia Type: general ASA Status: 2            Anesthesia Type: general    Vitals  Vitals Value Taken Time   /94 03/10/25 10:58   Temp     Pulse 78 03/10/25 11:04   Resp 16 03/10/25 11:04   SpO2 96 % 03/10/25 11:04           Post Anesthesia Care and Evaluation    Patient location during evaluation: PACU  Patient participation: complete - patient participated  Level of consciousness: awake  Pain scale: See nurse's notes for pain score.  Pain management: adequate    Airway patency: patent  Anesthetic complications: No anesthetic complications  PONV Status: none  Cardiovascular status: acceptable  Respiratory status: acceptable and spontaneous ventilation  Hydration status: acceptable    Comments: Patient seen and examined postoperatively; vital signs stable; SpO2 greater than or equal to 90%; cardiopulmonary status stable; nausea/vomiting adequately controlled; pain adequately controlled; no apparent anesthesia complications; patient discharged from anesthesia care when discharge criteria were met

## 2025-03-10 NOTE — DISCHARGE INSTRUCTIONS
A responsible adult should stay with you and you should rest quietly for the rest of the day.    Do not drink alcohol, drive, operate any heavy machinery or power tools or make any legal/important decisions for the next 24 hours.     Progress your diet as tolerated.  If you begin to experience severe pain, increased shortness of breath, racing heartbeat or a fever above 101 F, seek immediate medical attention.     Follow up with MD as instructed. Call office for results in 3 to 5 days if needed.    [phone] Dr. Peoples    Recommendation:      Obtain CT chest, abdomen and pelvis   Obtain CEA  Follow up pathology   Follow up in clinic for further discussion and intervention   Repeat colonoscopy in 1 year

## 2025-03-13 ENCOUNTER — HOSPITAL ENCOUNTER (OUTPATIENT)
Dept: PET IMAGING | Facility: HOSPITAL | Age: 55
Discharge: HOME | End: 2025-03-13
Admitting: STUDENT IN AN ORGANIZED HEALTH CARE EDUCATION/TRAINING PROGRAM
Payer: COMMERCIAL

## 2025-03-13 ENCOUNTER — LAB (OUTPATIENT)
Dept: LAB | Facility: HOSPITAL | Age: 55
End: 2025-03-13
Payer: COMMERCIAL

## 2025-03-13 DIAGNOSIS — C18.7 CANCER OF SIGMOID: ICD-10-CM

## 2025-03-13 LAB — CEA SERPL-MCNC: 2.23 NG/ML

## 2025-03-13 PROCEDURE — 74177 CT ABD & PELVIS W/CONTRAST: CPT

## 2025-03-13 PROCEDURE — 36415 COLL VENOUS BLD VENIPUNCTURE: CPT

## 2025-03-13 PROCEDURE — 82378 CARCINOEMBRYONIC ANTIGEN: CPT | Performed by: STUDENT IN AN ORGANIZED HEALTH CARE EDUCATION/TRAINING PROGRAM

## 2025-03-13 PROCEDURE — 71260 CT THORAX DX C+: CPT

## 2025-03-13 PROCEDURE — 25510000001 IOPAMIDOL PER 1 ML: Performed by: STUDENT IN AN ORGANIZED HEALTH CARE EDUCATION/TRAINING PROGRAM

## 2025-03-13 RX ORDER — IOPAMIDOL 755 MG/ML
100 INJECTION, SOLUTION INTRAVASCULAR
Status: COMPLETED | OUTPATIENT
Start: 2025-03-13 | End: 2025-03-13

## 2025-03-13 RX ADMIN — IOPAMIDOL 100 ML: 755 INJECTION, SOLUTION INTRAVENOUS at 07:53

## 2025-03-14 LAB
LAB AP CASE REPORT: NORMAL
LAB AP DIAGNOSIS COMMENT: NORMAL
PATH REPORT.ADDENDUM SPEC: NORMAL
PATH REPORT.FINAL DX SPEC: NORMAL
PATH REPORT.GROSS SPEC: NORMAL

## 2025-03-17 ENCOUNTER — OFFICE VISIT (OUTPATIENT)
Age: 55
End: 2025-03-17
Payer: COMMERCIAL

## 2025-03-17 VITALS
OXYGEN SATURATION: 98 % | SYSTOLIC BLOOD PRESSURE: 124 MMHG | TEMPERATURE: 98.4 F | WEIGHT: 258 LBS | HEART RATE: 100 BPM | HEIGHT: 70 IN | DIASTOLIC BLOOD PRESSURE: 95 MMHG | BODY MASS INDEX: 36.94 KG/M2

## 2025-03-17 DIAGNOSIS — N28.89 RENAL MASS, LEFT: ICD-10-CM

## 2025-03-17 DIAGNOSIS — C18.7 CANCER OF SIGMOID: Primary | ICD-10-CM

## 2025-03-17 RX ORDER — ALVIMOPAN 12 MG/1
12 CAPSULE ORAL ONCE
OUTPATIENT
Start: 2025-03-17 | End: 2025-03-17

## 2025-03-17 RX ORDER — SODIUM, POTASSIUM,MAG SULFATES 17.5-3.13G
SOLUTION, RECONSTITUTED, ORAL ORAL
Qty: 177 ML | Refills: 0 | Status: SHIPPED | OUTPATIENT
Start: 2025-03-17

## 2025-03-17 RX ORDER — CHLORHEXIDINE GLUCONATE 40 MG/ML
1 SOLUTION TOPICAL 2 TIMES DAILY
Qty: 236 ML | Refills: 0 | Status: SHIPPED | OUTPATIENT
Start: 2025-03-17

## 2025-03-17 RX ORDER — ACETAMINOPHEN 500 MG
1000 TABLET ORAL EVERY 6 HOURS
OUTPATIENT
Start: 2025-03-17

## 2025-03-17 RX ORDER — PREGABALIN 75 MG/1
75 CAPSULE ORAL ONCE
OUTPATIENT
Start: 2025-03-17 | End: 2025-03-17

## 2025-03-17 RX ORDER — NEOMYCIN SULFATE 500 MG/1
1000 TABLET ORAL 3 TIMES DAILY
Qty: 6 TABLET | Refills: 0 | Status: SHIPPED | OUTPATIENT
Start: 2025-03-17 | End: 2025-03-18

## 2025-03-17 RX ORDER — HEPARIN SODIUM 5000 [USP'U]/ML
5000 INJECTION, SOLUTION INTRAVENOUS; SUBCUTANEOUS ONCE
OUTPATIENT
Start: 2025-03-17 | End: 2025-03-17

## 2025-03-17 RX ORDER — ERYTHROMYCIN 500 MG/1
1000 TABLET, COATED ORAL 3 TIMES DAILY
Qty: 6 TABLET | Refills: 0 | Status: SHIPPED | OUTPATIENT
Start: 2025-03-17 | End: 2025-03-18

## 2025-03-17 RX ORDER — METRONIDAZOLE 500 MG/100ML
500 INJECTION, SOLUTION INTRAVENOUS ONCE
OUTPATIENT
Start: 2025-03-17 | End: 2025-03-17

## 2025-03-17 NOTE — PROGRESS NOTES
"Chief Complaint  Chief Complaint   Patient presents with    Cough     Cough from Lisinopril        Subjective          Vance Pearl Jr. is a 54-year-old male who reports to the office today due to cough.        Cough- He has been on the lisinopril and reports he has been coughing. He would like to come off the lisinopril due to this. He has lost some weight and his BP today is the best it has been. He reports his weight is coming off quickly.                    Review of Systems   Constitutional:  Negative for chills and fever.   HENT:  Negative for congestion.    Respiratory:  Positive for cough. Negative for shortness of breath.    Cardiovascular:  Negative for chest pain.   Gastrointestinal:  Negative for abdominal pain, nausea and vomiting.   Genitourinary:  Negative for difficulty urinating.   Musculoskeletal:  Negative for myalgias.   Skin: Negative.    Neurological:  Negative for dizziness, light-headedness and headache.        Objective   Vital Signs:   Vitals:    03/19/25 1256   BP: 122/75   Pulse: 101   Resp: 16   Temp: 98 °F (36.7 °C)   SpO2: 98%      Estimated body mass index is 37.16 kg/m² as calculated from the following:    Height as of this encounter: 177.8 cm (70\").    Weight as of this encounter: 117 kg (259 lb).    Class 2 Severe Obesity (BMI >=35 and <=39.9). Obesity-related health conditions include the following: hypertension. Obesity is improving with lifestyle modifications. BMI is is above average; BMI management plan is completed. We discussed portion control and increasing exercise.                    Physical Exam  Vitals reviewed.   Constitutional:       Appearance: Normal appearance.   HENT:      Head: Normocephalic and atraumatic.      Nose: Nose normal.      Mouth/Throat:      Mouth: Mucous membranes are moist.      Pharynx: Oropharynx is clear.   Eyes:      Extraocular Movements: Extraocular movements intact.      Conjunctiva/sclera: Conjunctivae normal.      Pupils: Pupils are " equal, round, and reactive to light.   Cardiovascular:      Rate and Rhythm: Normal rate and regular rhythm.      Pulses: Normal pulses.      Heart sounds: Normal heart sounds.      Comments: S1, S2 audible  Pulmonary:      Effort: Pulmonary effort is normal.      Breath sounds: Normal breath sounds.      Comments: On room air   Abdominal:      General: Abdomen is flat. Bowel sounds are normal.      Palpations: Abdomen is soft.   Musculoskeletal:         General: Normal range of motion.      Cervical back: Normal range of motion.   Skin:     General: Skin is warm and dry.   Neurological:      General: No focal deficit present.      Mental Status: He is alert and oriented to person, place, and time. Mental status is at baseline.   Psychiatric:         Mood and Affect: Mood normal.         Behavior: Behavior normal.         Thought Content: Thought content normal.         Judgment: Judgment normal.                Physical Exam   Result Review :             Procedures       Assessment and Plan     Diagnoses and all orders for this visit:    1. ACE-inhibitor cough (Primary)  Assessment & Plan:    Cough- He has been on the lisinopril and reports he has been coughing. He would like to come off the lisinopril due to this. He has lost some weight and his BP today is the best it has been. He reports his weight is coming off quickly.     Discontinue lisinopril   Keep BP log                 Follow Up   Return for Next scheduled follow up.   Patient was given instructions and counseling regarding her condition or for health maintenance advice. Please see specific information pulled into the AVS if appropriate.

## 2025-03-19 ENCOUNTER — OFFICE VISIT (OUTPATIENT)
Dept: FAMILY MEDICINE CLINIC | Facility: CLINIC | Age: 55
End: 2025-03-19
Payer: COMMERCIAL

## 2025-03-19 VITALS
OXYGEN SATURATION: 98 % | TEMPERATURE: 98 F | SYSTOLIC BLOOD PRESSURE: 122 MMHG | DIASTOLIC BLOOD PRESSURE: 75 MMHG | HEIGHT: 70 IN | BODY MASS INDEX: 37.08 KG/M2 | WEIGHT: 259 LBS | RESPIRATION RATE: 16 BRPM | HEART RATE: 101 BPM

## 2025-03-19 DIAGNOSIS — T46.4X5A ACE-INHIBITOR COUGH: Primary | ICD-10-CM

## 2025-03-19 DIAGNOSIS — R05.8 ACE-INHIBITOR COUGH: Primary | ICD-10-CM

## 2025-03-19 PROBLEM — C64.9 RENAL CELL CARCINOMA: Status: ACTIVE | Noted: 2025-03-19

## 2025-03-19 NOTE — ASSESSMENT & PLAN NOTE
Cough- He has been on the lisinopril and reports he has been coughing. He would like to come off the lisinopril due to this. He has lost some weight and his BP today is the best it has been. He reports his weight is coming off quickly.     Discontinue lisinopril   Keep BP log

## 2025-03-20 NOTE — PROGRESS NOTES
Colorectal Surgery Followup Note    ID:  Vance Pearl Jr.;   : 1970  DATE OF VISIT: 3/20/2025    Chief Complaint  Follow-up (Follow up on results )       Subjective    Mr. Woodard is here to follow-up.  His recent colonoscopy showed sigmoid mass.  Biopsy of the lesion was consistent with invasive adenocarcinoma with intact MSI/MMR.  Staging CT chest abdomen pelvis showed no metastatic disease.  However, the CT scan demonstrated 7 cm left renal mass concerning for renal cell carcinoma.    Patient currently denies any symptoms.  He denies any blood in the stool.  Denies any nausea or vomiting.  He denies any blood in the urine.    Exam  General:  No acute distress  Head: Normocephalic, atraumatic  Neuro: Alert and oriented    Abdomen:  Soft, non-tender, non-distended, no hernias, no hepatomegaly, no splenomegaly. No abnormal, audible bowel sounds.      Assessment  -Sigmoid cancer  -Left renal mass concerning for renal cell carcinoma    Plan / Recommendations    - I have discussed in detail with the patient the findings from his CT scan as well as the pathology results from his colonoscopy. We reviewed the pathophysiology and staging of colon cancer. Given that no metastatic disease was noted on his CT scan, we will proceed with upfront resection. I will refer the patient to urology for a left necrotic nephrectomy to be performed concurrently with the colon resection. The approach will be a robotic low anterior resection with primary anastomosis and left nephrectomy. I have discussed the case with Dr. Zaidi and we reviewed the CT scan together.    We thoroughly discussed the risks associated with the procedure, which include, but are not limited to: bleeding, superficial wound infection, abdominal/pelvic infection, anastomotic leak or stenosis, bowel injury, ureter injury, abdominal wall hernia, sexual dysfunction due to nerve damage, stroke, myocardial infarction, pneumonia, pulmonary embolus, urinary tract  infection, deep vein thrombosis, and even death. Additional treatments or operations may be required, depending on the circumstances.    - The patient demonstrates understanding, and all questions have been answered. The patient consents to proceed with the proposed procedure.    - We will send the patient for genetic testing to check for any germline mutations.    - Referral to urology and medical oncology has been sent .    - His CEA level was noted to be 2.23.    -Present his case at the multidisciplinary tumor board for further discussion    -Follow-up at the time of surgery      Arturo Peoples MD  Colon and Rectal Surgery   [facility]

## 2025-03-21 NOTE — PROGRESS NOTES
Hematology/Oncology Outpatient Consultation    Patient name: Vance Pearl Jr.  : 1970  MRN: [id number]  Primary Care Physician: Leann Duke APRN  Referring Physician: Arturo Peoples MD  Reason For Consult:     Chief Complaint   Patient presents with    Appointment     Cancer of sigmoid       History of Present Illness:      54-year-old male who had a routine screening colonoscopy was found to have sigmoidal mass on 3/10/2025.  Review of the colonoscopy suggest I had a 3 cm polyp in the rectum, in mass the rest of the polyps were tubular adenoma and inflammatory polyp.There is partially obstructing infiltrative highly suspicious malignant mass.  The mass extended for over 4 cm.  Multiple biopsies with taken.  This mass was noted to be at 20 cm from anal verge based on proctoscopy findings.  Close additional polyp measuring up to 3 cm which was removed, transverse colon also had a 1 cm pedunculated polyp which was also removed    Pathology revealed invasive poorly differentiated adenocarcinoma  MMR testing showed intact MMR    Patient subsequently had a CEA done which was 2.23    CT scan of the chest, abdomen and pelvis for restaging basically showed a mild aneurysmal dilatation of the ascending aorta measuring 4.1 cm no evidence of metastatic disease in the chest.  The abdomen there was no evidence of liver lesions.  He has gallstones.  At the upper pole of the left kidney there is a mass measuring 7.3 cm x 5.8 cm worrisome for renal cell carcinoma.  There was no definite extension into the left main renal vein.  No suspicious mass on the right kidney .      Patient has been referred for further evaluation and management of the above findings.        Patient is   He has 2 children  Does not smoke  He does not drink alcohol  He is a  of a [employer]    Family history significant for daughter with brain tumor at the age of 90s oligodendroglioma  Sister had meningioma at the  age of 54  His mother had precancerous polyps maternal grandmother also has precancerous colonic polyps  2 maternal aunts with lung cancer    He is accompanied today by his spouse for this appointment.        Past Medical History:   Diagnosis Date    GERD (gastroesophageal reflux disease)     Hypertension 2018       Past Surgical History:   Procedure Laterality Date    COLONOSCOPY N/A 3/10/2025    Procedure: COLONOSCOPY with POLYPECTOMY, SCLEROTHERAPY with INK TATOO, & BIOPSY;  Surgeon: Arturo Peoples MD;  Location: [facility] ENDOSCOPY;  Service: General;  Laterality: N/A;  POST: DIVERTICULOSIS, COLON POLYPS, COLON MASS         Current Outpatient Medications:     Chlorhexidine Gluconate 4 % solution, Apply 1 Application topically to the appropriate area as directed 2 (Two) Times a Day. Shower With Hibiclens Solution Twice The Day Before Surgery, Disp: 236 mL, Rfl: 0    sodium-potassium-magnesium sulfates (SUPREP) 17.5-3.13-1.6 GM/177ML solution oral solution, PLEASE REPLACE WITH GOLYTELY 4000ML IF SUPREP IS NOT APPROVED BY INSURANCE 5 PM the day before your scheduled colonoscopy - Take your 1st dose of bowel prep 5 AM - Take your 2nd dose of bowel prep You may still have watery bowel movements after you finish drinking the solution., Disp: 177 mL, Rfl: 0    sodium-potassium-magnesium sulfates (SUPREP) 17.5-3.13-1.6 GM/177ML solution oral solution, Step 1: 5 PM the day before your scheduled colonoscopy - Take your 1st dose of bowel prep Step 2: 5 AM - Take your 2nd dose of bowel prep FIRST DOSE: 5 PM the day before your scheduled colonoscopy. Pour one 6-ounce bottle of SUPREP liquid into the mixing container. Add cool drinking water to the 16-ounce line on the container and mix; this will dilute the concentrated solution. Drink all the liquid in the container at one time - using a straw will help. Drink two more 16-ounce glasses of water over the next hour. Watery bowel movements should begin in approximately one hour.  SECOND DOSE: 5 AM On the day of your colonoscopy. Repeat steps 1-4. You may still have watery bowel movements after you finish drinking the solution., Disp: 177 mL, Rfl: 0    No Known Allergies    Immunization History   Administered Date(s) Administered    COVID-19 (PFIZER) BIVALENT 12+YRS 11/05/2022    COVID-19 (PFIZER) Purple Cap Monovalent 12/16/2021    Fluzone (or Fluarix & Flulaval for VFC) >6mos 11/05/2022       Family History   Problem Relation Age of Onset    Cancer Mother         Rectal       Cancer-related family history includes Cancer in his mother.    Social History     Tobacco Use    Smoking status: Never     Passive exposure: Never    Smokeless tobacco: Never   Vaping Use    Vaping status: Never Used   Substance Use Topics    Alcohol use: Never    Drug use: Never       ROS:    Review of Systems   Constitutional:  Negative for chills, fatigue and fever.   HENT:  Negative for congestion, drooling, ear discharge, rhinorrhea, sinus pressure and tinnitus.    Eyes:  Negative for photophobia, pain and discharge.   Respiratory:  Negative for apnea, choking and stridor.    Cardiovascular:  Negative for palpitations.   Gastrointestinal:  Negative for abdominal distention, abdominal pain and anal bleeding.   Endocrine: Negative for polydipsia and polyphagia.   Genitourinary:  Negative for decreased urine volume, flank pain and genital sores.   Musculoskeletal:  Negative for gait problem, neck pain and neck stiffness.   Skin:  Negative for color change, rash and wound.   Neurological:  Negative for tremors, seizures, syncope, facial asymmetry and speech difficulty.   Hematological:  Negative for adenopathy.   Psychiatric/Behavioral:  Negative for agitation, confusion, hallucinations and self-injury. The patient is not hyperactive.        Objective:    Vitals:    03/24/25 0900   BP: 144/90   Pulse: 77   Resp: 18   Temp: 98.3 °F (36.8 °C)   TempSrc: Infrared   SpO2: 99%   Weight: 118 kg (260 lb)   Height: 177.8 cm  "(70\")   PainSc: 0-No pain     Body mass index is 37.31 kg/m².    ECOG  (0) Fully active, able to carry on all predisease performance without restriction    Physical Exam:    Physical Exam  Vitals and nursing note reviewed.   Constitutional:       General: He is not in acute distress.     Appearance: He is not diaphoretic.   HENT:      Head: Normocephalic and atraumatic.   Eyes:      General: No scleral icterus.        Right eye: No discharge.         Left eye: No discharge.      Conjunctiva/sclera: Conjunctivae normal.   Neck:      Thyroid: No thyromegaly.   Cardiovascular:      Rate and Rhythm: Normal rate and regular rhythm.      Heart sounds: Normal heart sounds.      No friction rub. No gallop.   Pulmonary:      Effort: Pulmonary effort is normal. No respiratory distress.      Breath sounds: No stridor. No wheezing.   Abdominal:      General: Bowel sounds are normal.      Palpations: Abdomen is soft. There is no mass.      Tenderness: There is no abdominal tenderness. There is no guarding or rebound.   Musculoskeletal:         General: No tenderness. Normal range of motion.      Cervical back: Normal range of motion and neck supple.   Lymphadenopathy:      Cervical: No cervical adenopathy.   Skin:     General: Skin is warm.      Findings: No erythema or rash.   Neurological:      Mental Status: He is alert and oriented to person, place, and time.      Motor: No abnormal muscle tone.   Psychiatric:         Behavior: Behavior normal.       RECENT LABS  WBC   Date Value Ref Range Status   03/24/2025 8.39 3.40 - 10.80 10*3/mm3 Final     RBC   Date Value Ref Range Status   03/24/2025 5.34 4.14 - 5.80 10*6/mm3 Final     Hemoglobin   Date Value Ref Range Status   03/24/2025 14.3 13.0 - 17.7 g/dL Final   05/26/2023 15.9 13.0 - 17.7 g/dL Final   10/11/2022 15.4 13.0 - 17.7 g/dL Final     Hematocrit   Date Value Ref Range Status   03/24/2025 44.5 37.5 - 51.0 % Final   05/26/2023 48.2 37.5 - 51.0 % Final     MCV   Date " Value Ref Range Status   03/24/2025 83.3 79.0 - 97.0 fL Final     MCH   Date Value Ref Range Status   03/24/2025 26.8 26.6 - 33.0 pg Final     MCHC   Date Value Ref Range Status   03/24/2025 32.1 31.5 - 35.7 g/dL Final     RDW   Date Value Ref Range Status   03/24/2025 14.3 12.3 - 15.4 % Final     RDW-SD   Date Value Ref Range Status   03/24/2025 43.2 37.0 - 54.0 fl Final     MPV   Date Value Ref Range Status   03/24/2025 11.5 6.0 - 12.0 fL Final     Platelets   Date Value Ref Range Status   03/24/2025 128 (L) 140 - 450 10*3/mm3 Final     Neutrophil %   Date Value Ref Range Status   03/24/2025 59.4 42.7 - 76.0 % Final     Lymphocyte %   Date Value Ref Range Status   03/24/2025 26.5 19.6 - 45.3 % Final     Monocyte %   Date Value Ref Range Status   03/24/2025 10.0 5.0 - 12.0 % Final     Eosinophil %   Date Value Ref Range Status   03/24/2025 3.7 0.3 - 6.2 % Final     Basophil %   Date Value Ref Range Status   03/24/2025 0.4 0.0 - 1.5 % Final     Neutrophils, Absolute   Date Value Ref Range Status   03/24/2025 4.99 1.70 - 7.00 10*3/mm3 Final     Lymphocytes, Absolute   Date Value Ref Range Status   03/24/2025 2.22 0.70 - 3.10 10*3/mm3 Final     Monocytes, Absolute   Date Value Ref Range Status   03/24/2025 0.84 0.10 - 0.90 10*3/mm3 Final     Eosinophils, Absolute   Date Value Ref Range Status   03/24/2025 0.31 0.00 - 0.40 10*3/mm3 Final     Basophils, Absolute   Date Value Ref Range Status   03/24/2025 0.03 0.00 - 0.20 10*3/mm3 Final       Lab Results   Component Value Date    GLUCOSE 85 09/03/2024    BUN 12 09/03/2024    CREATININE 1.24 09/03/2024    BCR 9.7 09/03/2024    K 4.8 09/03/2024    CO2 26.0 09/03/2024    CALCIUM 9.4 09/03/2024    ALBUMIN 4.2 09/03/2024    AST 14 09/03/2024    ALT 18 09/03/2024         Assessment & Plan   Cancer of sigmoid  - CBC & Differential      Invasive moderately differentiated sigmoid: Adenocarcinoma.  MMR intact  Suspicious left kidney mass for primary kidney cancer  Family history  of multiple colonic polyps, cancers worrisome for hereditary cancer syndrome.  Patient will benefit from genetic testing  Ascending aortic aneurysm 4.1 cm: Will need to be referred to vascular after surgical management of his malignancy  Asymptomatic cholelithiasis: Patient notified  2 to  3 mm left lower lobe nodule, right calcified upper lobe granuloma            Plans:    Reviewed his imaging studies, progress notes  Discussed colon cancer in general, treatment recommendations, indication for chemotherapy were reviewed with him  He has a large left renal mass suspect primary renal neoplastic process.  Patient is to see Dr. Zaidi this week and plan surgical resection at the time of his colon cancer surgery with Dr Peoples  Discussed that he would benefit from genetic testing.  Patient to consider this after surgical resections  Follow-up 4 to 5 weeks from now to review final pathology and discuss adjuvant treatments that may be indicated  All questions answered  Will continue to follow            I spent 60 total minutes, face-to-face, caring for Vance today. 90% of this time involved counseling and/or coordination of care as documented within this note.       Electronically signed by Krissy March MD, 03/24/25, 5:30 PM EDT.

## 2025-03-24 ENCOUNTER — CONSULT (OUTPATIENT)
Dept: ONCOLOGY | Facility: CLINIC | Age: 55
End: 2025-03-24
Payer: COMMERCIAL

## 2025-03-24 ENCOUNTER — LAB (OUTPATIENT)
Dept: LAB | Facility: HOSPITAL | Age: 55
End: 2025-03-24
Payer: COMMERCIAL

## 2025-03-24 VITALS
OXYGEN SATURATION: 99 % | SYSTOLIC BLOOD PRESSURE: 144 MMHG | DIASTOLIC BLOOD PRESSURE: 90 MMHG | WEIGHT: 260 LBS | RESPIRATION RATE: 18 BRPM | HEIGHT: 70 IN | HEART RATE: 77 BPM | TEMPERATURE: 98.3 F | BODY MASS INDEX: 37.22 KG/M2

## 2025-03-24 DIAGNOSIS — C18.7 CANCER OF SIGMOID: ICD-10-CM

## 2025-03-24 DIAGNOSIS — C18.7 CANCER OF SIGMOID: Primary | ICD-10-CM

## 2025-03-24 LAB
BASOPHILS # BLD AUTO: 0.03 10*3/MM3 (ref 0–0.2)
BASOPHILS NFR BLD AUTO: 0.4 % (ref 0–1.5)
DEPRECATED RDW RBC AUTO: 43.2 FL (ref 37–54)
EOSINOPHIL # BLD AUTO: 0.31 10*3/MM3 (ref 0–0.4)
EOSINOPHIL NFR BLD AUTO: 3.7 % (ref 0.3–6.2)
ERYTHROCYTE [DISTWIDTH] IN BLOOD BY AUTOMATED COUNT: 14.3 % (ref 12.3–15.4)
HCT VFR BLD AUTO: 44.5 % (ref 37.5–51)
HGB BLD-MCNC: 14.3 G/DL (ref 13–17.7)
LYMPHOCYTES # BLD AUTO: 2.22 10*3/MM3 (ref 0.7–3.1)
LYMPHOCYTES NFR BLD AUTO: 26.5 % (ref 19.6–45.3)
MCH RBC QN AUTO: 26.8 PG (ref 26.6–33)
MCHC RBC AUTO-ENTMCNC: 32.1 G/DL (ref 31.5–35.7)
MCV RBC AUTO: 83.3 FL (ref 79–97)
MONOCYTES # BLD AUTO: 0.84 10*3/MM3 (ref 0.1–0.9)
MONOCYTES NFR BLD AUTO: 10 % (ref 5–12)
NEUTROPHILS NFR BLD AUTO: 4.99 10*3/MM3 (ref 1.7–7)
NEUTROPHILS NFR BLD AUTO: 59.4 % (ref 42.7–76)
PLATELET # BLD AUTO: 128 10*3/MM3 (ref 140–450)
PMV BLD AUTO: 11.5 FL (ref 6–12)
RBC # BLD AUTO: 5.34 10*6/MM3 (ref 4.14–5.8)
WBC NRBC COR # BLD AUTO: 8.39 10*3/MM3 (ref 3.4–10.8)

## 2025-03-24 PROCEDURE — 36415 COLL VENOUS BLD VENIPUNCTURE: CPT

## 2025-03-24 PROCEDURE — 99205 OFFICE O/P NEW HI 60 MIN: CPT | Performed by: INTERNAL MEDICINE

## 2025-03-24 PROCEDURE — 85025 COMPLETE CBC W/AUTO DIFF WBC: CPT

## 2025-03-24 NOTE — LETTER
2025     Arturo Peoples MD  [address] [facility] [address]  [place] [id number]    Patient: Vance Pearl Jr.   YOB: 1970   Date of Visit: 3/24/2025     Dear Arturo Peoples MD:       Thank you for referring Vance Pearl to me for evaluation. Below are the relevant portions of my assessment and plan of care.    If you have questions, please do not hesitate to call me. I look forward to following Vance along with you.         Sincerely,        Krissy March MD        CC: No Recipients    Krissy March MD  25 [phone]  Sign when Signing Visit   Hematology/Oncology Outpatient Consultation    Patient name: Vance Pearl Jr.  : 1970  MRN: [id number]  Primary Care Physician: Leann Duke APRN  Referring Physician: Arturo Peoples MD  Reason For Consult:     Chief Complaint   Patient presents with   • Appointment     Cancer of sigmoid       History of Present Illness:      54-year-old male who had a routine screening colonoscopy was found to have sigmoidal mass on 3/10/2025.  Review of the colonoscopy suggest I had a 3 cm polyp in the rectum, in mass the rest of the polyps were tubular adenoma and inflammatory polyp.There is partially obstructing infiltrative highly suspicious malignant mass.  The mass extended for over 4 cm.  Multiple biopsies with taken.  This mass was noted to be at 20 cm from anal verge based on proctoscopy findings.  Close additional polyp measuring up to 3 cm which was removed, transverse colon also had a 1 cm pedunculated polyp which was also removed    Pathology revealed invasive poorly differentiated adenocarcinoma  MMR testing showed intact MMR    Patient subsequently had a CEA done which was 2.23    CT scan of the chest, abdomen and pelvis for restaging basically showed a mild aneurysmal dilatation of the ascending aorta measuring 4.1 cm no evidence of metastatic disease in the chest.  The abdomen there was no evidence of liver lesions.  He has  gallstones.  At the upper pole of the left kidney there is a mass measuring 7.3 cm x 5.8 cm worrisome for renal cell carcinoma.  There was no definite extension into the left main renal vein.  No suspicious mass on the right kidney .      Patient has been referred for further evaluation and management of the above findings.        Patient is   He has 2 children  Does not smoke  He does not drink alcohol  He is a  of a [employer]    Family history significant for daughter with brain tumor at the age of 90s oligodendroglioma  Sister had meningioma at the age of 54  His mother had precancerous polyps maternal grandmother also has precancerous colonic polyps  2 maternal aunts with lung cancer    He is accompanied today by his spouse for this appointment.        Past Medical History:   Diagnosis Date   • GERD (gastroesophageal reflux disease)    • Hypertension 2018       Past Surgical History:   Procedure Laterality Date   • COLONOSCOPY N/A 3/10/2025    Procedure: COLONOSCOPY with POLYPECTOMY, SCLEROTHERAPY with INK TATOO, & BIOPSY;  Surgeon: Arturo Peoples MD;  Location: [facility] ENDOSCOPY;  Service: General;  Laterality: N/A;  POST: DIVERTICULOSIS, COLON POLYPS, COLON MASS         Current Outpatient Medications:   •  Chlorhexidine Gluconate 4 % solution, Apply 1 Application topically to the appropriate area as directed 2 (Two) Times a Day. Shower With Hibiclens Solution Twice The Day Before Surgery, Disp: 236 mL, Rfl: 0  •  sodium-potassium-magnesium sulfates (SUPREP) 17.5-3.13-1.6 GM/177ML solution oral solution, PLEASE REPLACE WITH GOLYTELY 4000ML IF SUPREP IS NOT APPROVED BY INSURANCE 5 PM the day before your scheduled colonoscopy - Take your 1st dose of bowel prep 5 AM - Take your 2nd dose of bowel prep You may still have watery bowel movements after you finish drinking the solution., Disp: 177 mL, Rfl: 0  •  sodium-potassium-magnesium sulfates (SUPREP) 17.5-3.13-1.6 GM/177ML solution oral  solution, Step 1: 5 PM the day before your scheduled colonoscopy - Take your 1st dose of bowel prep Step 2: 5 AM - Take your 2nd dose of bowel prep FIRST DOSE: 5 PM the day before your scheduled colonoscopy. Pour one 6-ounce bottle of SUPREP liquid into the mixing container. Add cool drinking water to the 16-ounce line on the container and mix; this will dilute the concentrated solution. Drink all the liquid in the container at one time - using a straw will help. Drink two more 16-ounce glasses of water over the next hour. Watery bowel movements should begin in approximately one hour. SECOND DOSE: 5 AM On the day of your colonoscopy. Repeat steps 1-4. You may still have watery bowel movements after you finish drinking the solution., Disp: 177 mL, Rfl: 0    No Known Allergies    Immunization History   Administered Date(s) Administered   • COVID-19 (PFIZER) BIVALENT 12+YRS 11/05/2022   • COVID-19 (PFIZER) Purple Cap Monovalent 12/16/2021   • Fluzone (or Fluarix & Flulaval for VFC) >6mos 11/05/2022       Family History   Problem Relation Age of Onset   • Cancer Mother         Rectal       Cancer-related family history includes Cancer in his mother.    Social History     Tobacco Use   • Smoking status: Never     Passive exposure: Never   • Smokeless tobacco: Never   Vaping Use   • Vaping status: Never Used   Substance Use Topics   • Alcohol use: Never   • Drug use: Never       ROS:    Review of Systems   Constitutional:  Negative for chills, fatigue and fever.   HENT:  Negative for congestion, drooling, ear discharge, rhinorrhea, sinus pressure and tinnitus.    Eyes:  Negative for photophobia, pain and discharge.   Respiratory:  Negative for apnea, choking and stridor.    Cardiovascular:  Negative for palpitations.   Gastrointestinal:  Negative for abdominal distention, abdominal pain and anal bleeding.   Endocrine: Negative for polydipsia and polyphagia.   Genitourinary:  Negative for decreased urine volume, flank pain  "and genital sores.   Musculoskeletal:  Negative for gait problem, neck pain and neck stiffness.   Skin:  Negative for color change, rash and wound.   Neurological:  Negative for tremors, seizures, syncope, facial asymmetry and speech difficulty.   Hematological:  Negative for adenopathy.   Psychiatric/Behavioral:  Negative for agitation, confusion, hallucinations and self-injury. The patient is not hyperactive.        Objective:    Vitals:    03/24/25 0900   BP: 144/90   Pulse: 77   Resp: 18   Temp: 98.3 °F (36.8 °C)   TempSrc: Infrared   SpO2: 99%   Weight: 118 kg (260 lb)   Height: 177.8 cm (70\")   PainSc: 0-No pain     Body mass index is 37.31 kg/m².    ECOG  (0) Fully active, able to carry on all predisease performance without restriction    Physical Exam:    Physical Exam  Vitals and nursing note reviewed.   Constitutional:       General: He is not in acute distress.     Appearance: He is not diaphoretic.   HENT:      Head: Normocephalic and atraumatic.   Eyes:      General: No scleral icterus.        Right eye: No discharge.         Left eye: No discharge.      Conjunctiva/sclera: Conjunctivae normal.   Neck:      Thyroid: No thyromegaly.   Cardiovascular:      Rate and Rhythm: Normal rate and regular rhythm.      Heart sounds: Normal heart sounds.      No friction rub. No gallop.   Pulmonary:      Effort: Pulmonary effort is normal. No respiratory distress.      Breath sounds: No stridor. No wheezing.   Abdominal:      General: Bowel sounds are normal.      Palpations: Abdomen is soft. There is no mass.      Tenderness: There is no abdominal tenderness. There is no guarding or rebound.   Musculoskeletal:         General: No tenderness. Normal range of motion.      Cervical back: Normal range of motion and neck supple.   Lymphadenopathy:      Cervical: No cervical adenopathy.   Skin:     General: Skin is warm.      Findings: No erythema or rash.   Neurological:      Mental Status: He is alert and oriented to " person, place, and time.      Motor: No abnormal muscle tone.   Psychiatric:         Behavior: Behavior normal.       RECENT LABS  WBC   Date Value Ref Range Status   03/24/2025 8.39 3.40 - 10.80 10*3/mm3 Final     RBC   Date Value Ref Range Status   03/24/2025 5.34 4.14 - 5.80 10*6/mm3 Final     Hemoglobin   Date Value Ref Range Status   03/24/2025 14.3 13.0 - 17.7 g/dL Final   05/26/2023 15.9 13.0 - 17.7 g/dL Final   10/11/2022 15.4 13.0 - 17.7 g/dL Final     Hematocrit   Date Value Ref Range Status   03/24/2025 44.5 37.5 - 51.0 % Final   05/26/2023 48.2 37.5 - 51.0 % Final     MCV   Date Value Ref Range Status   03/24/2025 83.3 79.0 - 97.0 fL Final     MCH   Date Value Ref Range Status   03/24/2025 26.8 26.6 - 33.0 pg Final     MCHC   Date Value Ref Range Status   03/24/2025 32.1 31.5 - 35.7 g/dL Final     RDW   Date Value Ref Range Status   03/24/2025 14.3 12.3 - 15.4 % Final     RDW-SD   Date Value Ref Range Status   03/24/2025 43.2 37.0 - 54.0 fl Final     MPV   Date Value Ref Range Status   03/24/2025 11.5 6.0 - 12.0 fL Final     Platelets   Date Value Ref Range Status   03/24/2025 128 (L) 140 - 450 10*3/mm3 Final     Neutrophil %   Date Value Ref Range Status   03/24/2025 59.4 42.7 - 76.0 % Final     Lymphocyte %   Date Value Ref Range Status   03/24/2025 26.5 19.6 - 45.3 % Final     Monocyte %   Date Value Ref Range Status   03/24/2025 10.0 5.0 - 12.0 % Final     Eosinophil %   Date Value Ref Range Status   03/24/2025 3.7 0.3 - 6.2 % Final     Basophil %   Date Value Ref Range Status   03/24/2025 0.4 0.0 - 1.5 % Final     Neutrophils, Absolute   Date Value Ref Range Status   03/24/2025 4.99 1.70 - 7.00 10*3/mm3 Final     Lymphocytes, Absolute   Date Value Ref Range Status   03/24/2025 2.22 0.70 - 3.10 10*3/mm3 Final     Monocytes, Absolute   Date Value Ref Range Status   03/24/2025 0.84 0.10 - 0.90 10*3/mm3 Final     Eosinophils, Absolute   Date Value Ref Range Status   03/24/2025 0.31 0.00 - 0.40 10*3/mm3  Final     Basophils, Absolute   Date Value Ref Range Status   03/24/2025 0.03 0.00 - 0.20 10*3/mm3 Final       Lab Results   Component Value Date    GLUCOSE 85 09/03/2024    BUN 12 09/03/2024    CREATININE 1.24 09/03/2024    BCR 9.7 09/03/2024    K 4.8 09/03/2024    CO2 26.0 09/03/2024    CALCIUM 9.4 09/03/2024    ALBUMIN 4.2 09/03/2024    AST 14 09/03/2024    ALT 18 09/03/2024         Assessment & Plan   Cancer of sigmoid  - CBC & Differential      Invasive moderately differentiated sigmoid: Adenocarcinoma.  MMR intact  Suspicious left kidney mass for primary kidney cancer  Family history of multiple colonic polyps, cancers worrisome for hereditary cancer syndrome.  Patient will benefit from genetic testing  Ascending aortic aneurysm 4.1 cm: Will need to be referred to vascular after surgical management of his malignancy  Asymptomatic cholelithiasis: Patient notified  2 to  3 mm left lower lobe nodule, right calcified upper lobe granuloma            Plans:    Reviewed his imaging studies, progress notes  Discussed colon cancer in general, treatment recommendations, indication for chemotherapy were reviewed with him  He has a large left renal mass suspect primary renal neoplastic process.  Patient is to see Dr. Zaidi this week and plan surgical resection at the time of his colon cancer surgery with Dr Peoples  Discussed that he would benefit from genetic testing.  Patient to consider this after surgical resections  Follow-up 4 to 5 weeks from now to review final pathology and discuss adjuvant treatments that may be indicated  All questions answered  Will continue to follow            I spent 60 total minutes, face-to-face, caring for Vance today. 90% of this time involved counseling and/or coordination of care as documented within this note.       Electronically signed by Krissy March MD, 03/24/25, 5:30 PM EDT.

## 2025-03-31 ENCOUNTER — TELEPHONE (OUTPATIENT)
Dept: ONCOLOGY | Facility: CLINIC | Age: 55
End: 2025-03-31

## 2025-03-31 NOTE — TELEPHONE ENCOUNTER
Caller: Vance Pearl Jr.    Relationship to patient: Self    Best call back number: [phone]    Chief complaint: HAVING SURGERY 4-16    Type of visit: LAB AND FOLLOW UP    Requested date: 4-30     If rescheduling, when is the original appointment: 4-22     Additional notes:PLEASE ADVISE

## 2025-04-01 ENCOUNTER — TELEPHONE (OUTPATIENT)
Age: 55
End: 2025-04-01
Payer: COMMERCIAL

## 2025-04-01 ENCOUNTER — CLINICAL SUPPORT (OUTPATIENT)
Dept: GENETICS | Facility: HOSPITAL | Age: 55
End: 2025-04-01
Payer: COMMERCIAL

## 2025-04-01 ENCOUNTER — LAB (OUTPATIENT)
Dept: LAB | Facility: HOSPITAL | Age: 55
End: 2025-04-01
Payer: COMMERCIAL

## 2025-04-01 DIAGNOSIS — Z80.49 FAMILY HISTORY OF UTERINE CANCER: ICD-10-CM

## 2025-04-01 DIAGNOSIS — C64.2 RENAL CELL CARCINOMA OF LEFT KIDNEY: ICD-10-CM

## 2025-04-01 DIAGNOSIS — Z13.79 GENETIC TESTING: Primary | ICD-10-CM

## 2025-04-01 DIAGNOSIS — C18.7 CANCER OF SIGMOID: ICD-10-CM

## 2025-04-01 DIAGNOSIS — Z80.1 FAMILY HISTORY OF LUNG CANCER: ICD-10-CM

## 2025-04-01 DIAGNOSIS — Z80.3 FAMILY HISTORY OF BREAST CANCER: ICD-10-CM

## 2025-04-01 PROCEDURE — 96041 GENETIC COUNSELING SVC EA 30: CPT | Performed by: GENETIC COUNSELOR, MS

## 2025-04-01 NOTE — TELEPHONE ENCOUNTER
1. his ensure needs to be the immuno-nutrition shakes. He should start at least 5 days earlier and take it 3 times daily. This will be in addition to his regular meals. The day before surgery, he does the same but he will take bowel prep and the oral antibiotics.     2. he should take the probiotics in addition to the antibiotics he is taking for UTI     3. coffee is not clear liquid diet. He should stop with coffee after he starts the bowel prep in the evening the day before surgery

## 2025-04-01 NOTE — TELEPHONE ENCOUNTER
Hello again. I emailed earlier to ask about some of the pre-op instructions. I have a new question. My , Vance Pearl,  is scheduled for surgery on the 16th of April but he just found out that he has a UTI. He will be taking an antibiotic 2 times a day for 1 week, is he okay to take a probiotic supplement while taking an antibiotic? Thanks again.  Agnes. My , Vance Pearl, spoke with you this morning. I just had two questions concerning the pre-op directions. On Pre-Op Day 1, how many Ensure shakes should he drink and should he eat his regular diet? After midnight, it says nothing by mouth except the Ensure pre-surgery and clear drinks, does that include coffee?

## 2025-04-01 NOTE — PROGRESS NOTES
Vance Pearl Jr., a 54-year-old male, was referred for genetic counseling due to a personal and family history of cancer. Mr. Pearl was recently diagnosed with colon and kidney cancer at age 54. He is planning to have a colon resection and a left total nephrectomy. Further treatment will be dependent on the outcome of his surgeries. He was interested in discussing his risk for a hereditary cancer syndrome and genetic testing options. Mr. Pearl opted to pursue testing via the CancerNext Expanded panel through [employer] that evaluates 85 genes associated with increased cancer risk. Results are expected in 2-3 weeks.     FAMILY HISTORY (see attached pedigree):    Daughter:  Glioma, 9  Mother:  Basal cell, 72  Mat Aunt 1:  Lung cancer, 64  Mat Aunt 2:  Lung cancer, 59     Lymphoma  Mat Grandmother: Uterine cancer, 38     Liver cancer, 76  Pat Aunt 1:  Lung cancer, 66  Pat Aunt 2:  Breast cancer, 75  Pat Aunt 3:  Lung cancer, 62    We do not have medical records regarding any of these diagnoses.      RISK ASSESSMENT: Mr. Pearl's family history led to concern regarding a hereditary cancer syndrome. NCCN guidelines state that an individual with colon cancer diagnosed and a first- or second-degree relative diagnosed with a Yeh-related cancer under age 50 may consider genetic testing. Mr. Pearl meets this criteria based on his diagnosis and his maternal grandmother's diagnosis of uterine cancer at 38. We discussed multigene panel testing. This risk assessment is based on the family history information provided at the time of the appointment and could change in the future should new information be obtained.    GENETIC COUNSELING:  We reviewed the family history information in detail. Cases of cancer follow three general patterns: sporadic, familial, and hereditary. While most cancer is sporadic, some cases appear to occur in family clusters. These cases are said to be familial and account for 10-20% of  cancer cases. Familial cases may be due to a combination of shared genes and environmental factors among family members. In even fewer cases (5-10%), the risk for cancer is inherited, and the genes responsible for the increased cancer risk are known. Family histories typical of hereditary cancer syndromes usually include multiple first- and second-degree relatives diagnosed with cancer types that define a syndrome. These cases tend to be diagnosed at younger-than-expected ages and can be bilateral or multifocal. The cancer in these families follows an autosomal dominant inheritance pattern, which indicates the likely presence of a mutation in a cancer susceptibility gene. Children and siblings of an individual believed to carry this mutation have a 50% chance of inheriting that mutation, thereby inheriting the increased risk to develop cancer. These mutations can be passed down from the maternal or the paternal lineage.    We discussed that Yeh syndrome is the most common hereditary colon cancer syndrome. It is an autosomal dominant condition caused by mutations in mismatch repair genes (MLH1, MSH2, MSH6, PMS2, and EPCAM). The lifetime risk for colon cancer for individuals with Yeh syndrome is up to 80% if there is no intervention (i.e. removal of polyps detected on colonoscopy). Routine screening colonoscopy has been shown to reduce the incidence and mortality of colon cancer in Yeh syndrome families by as much as 65%. Other risks include cancer of the endometrium/uterus, ovary, stomach, urinary tract, small intestines, biliary tract, and brain. MLH1 and MSH2 carry these higher risks, while MSH6 and PMS2 carry significantly lower risks. The standard approach to genetic testing is via a multigene panel. Genes included on these panels have varying degrees of risk associated, and management and screening guidelines vary based on the specific gene.      We discussed there are other genes associated with increased  risk for colon cancer and other cancers, and the availability of multigene panel testing to evaluate multiple genes simultaneously. Mr. Pearl was interested in pursuing genetic testing to gather more information regarding cancer risks for himself and relatives.     GENETIC TESTING:  The risks, benefits and limitations of genetic testing and implications for clinical management following testing were reviewed. DNA test results can influence decisions regarding screening and prevention. Genetic testing can have significant psychological implications for both individuals and families. Also discussed was the possibility of employment and insurance discrimination based on genetic test results and the federal law that is in place to prevent this (HERBER), as well as the limitations of these laws.         We discussed panel testing, which would involve testing multiple genes associated with increased cancer risk. The benefits and limitations of genetic testing were discussed. The implications of a positive or negative test result were discussed. We discussed the possibility that, in some cases, genetic test results may be ambiguous due to the identification of a genetic variant. These variants may or may not be associated with an increased cancer risk. With multigene panel testing, it is not uncommon for a variant of uncertain significance (VUS) to be identified. If a VUS is identified, testing family members is not recommended and screening recommendations are made based on the family history. The laboratories that perform genetic testing work to reclassify the VUS and send out an amended report if and when a VUS is reclassified. The majority of variant findings are ultimately reclassified to a negative result. Given the family history, a negative test result does not eliminate all cancer risk, although the risk would not be as high as it would with positive genetic testing.     Total time spent caring for the patient  today was 35 minutes. This time includes chart review, time spent during the visit, and time spent after the visit on documentation and follow-up.    PLAN:  Genetic testing via the CancerNext Expanded panel through [employer] was ordered. He had his blood drawn today at [facility]. Results are expected in 2-3 weeks, and Mr. Pearl will be contacted at that time. If he has any questions or concerns in the meantime, he is welcome to contact us at [phone].      Sabina Domínguez MS, [facility], [facility]  Licensed Certified Genetic Counselor

## 2025-04-02 ENCOUNTER — TELEPHONE (OUTPATIENT)
Age: 55
End: 2025-04-02

## 2025-04-02 NOTE — TELEPHONE ENCOUNTER
Hub staff attempted to follow warm transfer process and was unsuccessful     Caller: Vance Pearl Jr.    Relationship to patient: Self    Best call back number: [phone]  PT CAN BE REACHED AT ANYTIME, IF NO ANSWER A DETAILED MSG CAN BE LEFT.     Patient is needing: PT IS CALLING TO RS THE FU APPT FROM 4/7/25 TO AFTER HIS S/P ON 4/16/25. HE DOES NOT WANT TO BE MARKED AS A NO SHOW.

## 2025-04-04 ENCOUNTER — LAB (OUTPATIENT)
Dept: LAB | Facility: HOSPITAL | Age: 55
End: 2025-04-04
Payer: COMMERCIAL

## 2025-04-04 ENCOUNTER — HOSPITAL ENCOUNTER (OUTPATIENT)
Dept: CARDIOLOGY | Facility: HOSPITAL | Age: 55
Discharge: HOME | End: 2025-04-04
Payer: COMMERCIAL

## 2025-04-04 DIAGNOSIS — C18.7 CANCER OF SIGMOID: ICD-10-CM

## 2025-04-04 LAB
ABO GROUP BLD: NORMAL
ANION GAP SERPL CALCULATED.3IONS-SCNC: 9.4 MMOL/L (ref 5–15)
BLD GP AB SCN SERPL QL: NEGATIVE
BUN SERPL-MCNC: 12 MG/DL (ref 6–20)
BUN/CREAT SERPL: 8.9 (ref 7–25)
CALCIUM SPEC-SCNC: 8.9 MG/DL (ref 8.6–10.5)
CEA SERPL-MCNC: 3.07 NG/ML
CHLORIDE SERPL-SCNC: 103 MMOL/L (ref 98–107)
CO2 SERPL-SCNC: 26.6 MMOL/L (ref 22–29)
CREAT SERPL-MCNC: 1.35 MG/DL (ref 0.76–1.27)
DEPRECATED RDW RBC AUTO: 42.2 FL (ref 37–54)
EGFRCR SERPLBLD CKD-EPI 2021: 62 ML/MIN/1.73
ERYTHROCYTE [DISTWIDTH] IN BLOOD BY AUTOMATED COUNT: 13.7 % (ref 12.3–15.4)
GLUCOSE SERPL-MCNC: 122 MG/DL (ref 65–99)
HBA1C MFR BLD: 5.29 % (ref 4.8–5.6)
HCT VFR BLD AUTO: 43.4 % (ref 37.5–51)
HGB BLD-MCNC: 13.5 G/DL (ref 13–17.7)
MCH RBC QN AUTO: 26.1 PG (ref 26.6–33)
MCHC RBC AUTO-ENTMCNC: 31.1 G/DL (ref 31.5–35.7)
MCV RBC AUTO: 83.9 FL (ref 79–97)
PLATELET # BLD AUTO: 316 10*3/MM3 (ref 140–450)
PMV BLD AUTO: 10.7 FL (ref 6–12)
POTASSIUM SERPL-SCNC: 4.5 MMOL/L (ref 3.5–5.2)
QT INTERVAL: 371 MS
QTC INTERVAL: 383 MS
RBC # BLD AUTO: 5.17 10*6/MM3 (ref 4.14–5.8)
RH BLD: POSITIVE
SODIUM SERPL-SCNC: 139 MMOL/L (ref 136–145)
T&S EXPIRATION DATE: NORMAL
WBC NRBC COR # BLD AUTO: 7.03 10*3/MM3 (ref 3.4–10.8)

## 2025-04-04 PROCEDURE — 82378 CARCINOEMBRYONIC ANTIGEN: CPT

## 2025-04-04 PROCEDURE — 83036 HEMOGLOBIN GLYCOSYLATED A1C: CPT

## 2025-04-04 PROCEDURE — 85027 COMPLETE CBC AUTOMATED: CPT

## 2025-04-04 PROCEDURE — 86850 RBC ANTIBODY SCREEN: CPT

## 2025-04-04 PROCEDURE — 93005 ELECTROCARDIOGRAM TRACING: CPT | Performed by: STUDENT IN AN ORGANIZED HEALTH CARE EDUCATION/TRAINING PROGRAM

## 2025-04-04 PROCEDURE — 86900 BLOOD TYPING SEROLOGIC ABO: CPT

## 2025-04-04 PROCEDURE — 80048 BASIC METABOLIC PNL TOTAL CA: CPT

## 2025-04-04 PROCEDURE — 36415 COLL VENOUS BLD VENIPUNCTURE: CPT

## 2025-04-04 PROCEDURE — 86901 BLOOD TYPING SEROLOGIC RH(D): CPT

## 2025-04-14 ENCOUNTER — TELEPHONE (OUTPATIENT)
Dept: GENETICS | Facility: HOSPITAL | Age: 55
End: 2025-04-14
Payer: COMMERCIAL

## 2025-04-14 ENCOUNTER — DOCUMENTATION (OUTPATIENT)
Dept: GENETICS | Facility: HOSPITAL | Age: 55
End: 2025-04-14
Payer: COMMERCIAL

## 2025-04-14 NOTE — PROGRESS NOTES
Vance Pearl Jr., a 54-year-old male, was referred for genetic counseling due to a personal and family history of cancer. Mr. Pearl was recently diagnosed with colon and kidney cancer at age 54. He is planning to have a colon resection and a left total nephrectomy. Further treatment will be dependent on the outcome of his surgeries. He was interested in discussing his risk for a hereditary cancer syndrome and genetic testing options. Mr. Pearl opted to pursue testing via the CancerNext Expanded panel through [employer] that evaluates 76 genes associated with increased cancer risk. Genetic testing was negative for pathogenic mutations on this panel (see attached). These normal results were discussed with Mr. Pearl by telephone on 4/14/25.    FAMILY HISTORY (see attached pedigree):    Daughter:  Glioma, 9  Mother:  Basal cell, 72  Mat Aunt 1:  Lung cancer, 64  Mat Aunt 2:  Lung cancer, 59     Lymphoma  Mat Grandmother: Uterine cancer, 38     Liver cancer, 76  Pat Aunt 1:  Lung cancer, 66  Pat Aunt 2:  Breast cancer, 75  Pat Aunt 3:  Lung cancer, 62    We do not have medical records regarding any of these diagnoses.      RISK ASSESSMENT: Mr. Pearl's family history led to concern regarding a hereditary cancer syndrome. NCCN guidelines state that an individual with colon cancer diagnosed and a first- or second-degree relative diagnosed with a Yeh-related cancer under age 50 may consider genetic testing. Mr. Pearl meets this criteria based on his diagnosis and his maternal grandmother's diagnosis of uterine cancer at 38. We discussed multigene panel testing. This risk assessment is based on the family history information provided at the time of the appointment and could change in the future should new information be obtained.    GENETIC COUNSELING:  We reviewed the family history information in detail. Cases of cancer follow three general patterns: sporadic, familial, and hereditary. While most  cancer is sporadic, some cases appear to occur in family clusters. These cases are said to be familial and account for 10-20% of cancer cases. Familial cases may be due to a combination of shared genes and environmental factors among family members. In even fewer cases (5-10%), the risk for cancer is inherited, and the genes responsible for the increased cancer risk are known. Family histories typical of hereditary cancer syndromes usually include multiple first- and second-degree relatives diagnosed with cancer types that define a syndrome. These cases tend to be diagnosed at younger-than-expected ages and can be bilateral or multifocal. The cancer in these families follows an autosomal dominant inheritance pattern, which indicates the likely presence of a mutation in a cancer susceptibility gene. Children and siblings of an individual believed to carry this mutation have a 50% chance of inheriting that mutation, thereby inheriting the increased risk to develop cancer. These mutations can be passed down from the maternal or the paternal lineage.    We discussed that Yeh syndrome is the most common hereditary colon cancer syndrome. It is an autosomal dominant condition caused by mutations in mismatch repair genes (MLH1, MSH2, MSH6, PMS2, and EPCAM). The lifetime risk for colon cancer for individuals with Yeh syndrome is up to 80% if there is no intervention (i.e. removal of polyps detected on colonoscopy). Routine screening colonoscopy has been shown to reduce the incidence and mortality of colon cancer in Yeh syndrome families by as much as 65%. Other risks include cancer of the endometrium/uterus, ovary, stomach, urinary tract, small intestines, biliary tract, and brain. MLH1 and MSH2 carry these higher risks, while MSH6 and PMS2 carry significantly lower risks. The standard approach to genetic testing is via a multigene panel. Genes included on these panels have varying degrees of risk associated, and  management and screening guidelines vary based on the specific gene.      We discussed there are other genes associated with increased risk for colon cancer and other cancers, and the availability of multigene panel testing to evaluate multiple genes simultaneously. Mr. Pearl was interested in pursuing genetic testing to gather more information regarding cancer risks for himself and relatives.     GENETIC TESTING:  The risks, benefits and limitations of genetic testing and implications for clinical management following testing were reviewed. DNA test results can influence decisions regarding screening and prevention. Genetic testing can have significant psychological implications for both individuals and families. Also discussed was the possibility of employment and insurance discrimination based on genetic test results and the federal law that is in place to prevent this (HERBER), as well as the limitations of these laws.         We discussed panel testing, which would involve testing multiple genes associated with increased cancer risk. The benefits and limitations of genetic testing were discussed. The implications of a positive or negative test result were discussed. We discussed the possibility that, in some cases, genetic test results may be ambiguous due to the identification of a genetic variant. These variants may or may not be associated with an increased cancer risk. With multigene panel testing, it is not uncommon for a variant of uncertain significance (VUS) to be identified. If a VUS is identified, testing family members is not recommended and screening recommendations are made based on the family history. The laboratories that perform genetic testing work to reclassify the VUS and send out an amended report if and when a VUS is reclassified. The majority of variant findings are ultimately reclassified to a negative result. Given the family history, a negative test result does not eliminate all cancer  risk, although the risk would not be as high as it would with positive genetic testing.     TEST RESULTS:  Genetic testing was negative for known pathogenic mutations by sequencing and rearrangement testing and RNA analysis for the 76 genes included on the CancerNext Expanded panel. This negative result lowers but does not eliminate the possibility of a hereditary cancer syndrome for Mr. Pearl. This assessment is based on the information provided at time of consultation.     CANCER SCREENING: Mr. Pearl's management and surveillance should be determined by his oncology team. For his family members, current NCCN guidelines recommend that individuals who have a first-degree relative diagnosed with colorectal cancer at any age should begin colonoscopy screening at age 40 or ten years before the earliest diagnosis of colorectal cancer in the family, whichever is earliest, and repeat every five years or more frequently based on personal clinical findings. This assessment is based on the information provided at the time of the consultation and could change should new information become available regarding the family history.    PLAN: Genetic counseling remains available to Mr. Pearl and his family. We encouraged him to contact us with any questions or concerns in the future at [phone].      Sabina Domínguez MS, [facility]  Licensed Certified Genetic Counselor       Cc: Jr. Arturo Gabriel MD

## 2025-04-14 NOTE — TELEPHONE ENCOUNTER
Spoke with patient and disclosed negative genetic results. Informed patient these results would be on [facility] and sent to their

## 2025-04-15 ENCOUNTER — ANESTHESIA EVENT (OUTPATIENT)
Dept: PERIOP | Facility: HOSPITAL | Age: 55
End: 2025-04-15
Payer: COMMERCIAL

## 2025-04-16 ENCOUNTER — HOSPITAL ENCOUNTER (INPATIENT)
Facility: HOSPITAL | Age: 55
LOS: 2 days | Discharge: HOME | DRG: 331 | End: 2025-04-18
Attending: STUDENT IN AN ORGANIZED HEALTH CARE EDUCATION/TRAINING PROGRAM | Admitting: STUDENT IN AN ORGANIZED HEALTH CARE EDUCATION/TRAINING PROGRAM
Payer: COMMERCIAL

## 2025-04-16 ENCOUNTER — ANESTHESIA (OUTPATIENT)
Dept: PERIOP | Facility: HOSPITAL | Age: 55
End: 2025-04-16
Payer: COMMERCIAL

## 2025-04-16 ENCOUNTER — ANESTHESIA EVENT CONVERTED (OUTPATIENT)
Dept: ANESTHESIOLOGY | Facility: HOSPITAL | Age: 55
DRG: 331 | End: 2025-04-16
Payer: COMMERCIAL

## 2025-04-16 DIAGNOSIS — N28.89 LEFT KIDNEY MASS: ICD-10-CM

## 2025-04-16 DIAGNOSIS — C18.7 CANCER OF SIGMOID: ICD-10-CM

## 2025-04-16 LAB
GLUCOSE BLDC GLUCOMTR-MCNC: 140 MG/DL (ref 70–105)
GLUCOSE BLDC GLUCOMTR-MCNC: 153 MG/DL (ref 70–105)

## 2025-04-16 PROCEDURE — 15860 IV NJX TST VASC FLO FLAP/GRF: CPT | Performed by: STUDENT IN AN ORGANIZED HEALTH CARE EDUCATION/TRAINING PROGRAM

## 2025-04-16 PROCEDURE — 25010000002 BUPIVACAINE LIPOSOME 1.3 % SUSPENSION: Performed by: ANESTHESIOLOGY

## 2025-04-16 PROCEDURE — 88307 TISSUE EXAM BY PATHOLOGIST: CPT | Performed by: STUDENT IN AN ORGANIZED HEALTH CARE EDUCATION/TRAINING PROGRAM

## 2025-04-16 PROCEDURE — 25010000002 ALBUMIN HUMAN 5% PER 50 ML: Performed by: ANESTHESIOLOGY

## 2025-04-16 PROCEDURE — 25010000002 CEFTRIAXONE PER 250 MG: Performed by: REGISTERED NURSE

## 2025-04-16 PROCEDURE — 0DTN4ZZ RESECTION OF SIGMOID COLON, PERCUTANEOUS ENDOSCOPIC APPROACH: ICD-10-PCS | Performed by: STUDENT IN AN ORGANIZED HEALTH CARE EDUCATION/TRAINING PROGRAM

## 2025-04-16 PROCEDURE — 25010000002 BUPIVACAINE (PF) 0.25 % SOLUTION: Performed by: ANESTHESIOLOGY

## 2025-04-16 PROCEDURE — 25010000002 BUPIVACAINE (PF) 0.25 % SOLUTION: Performed by: STUDENT IN AN ORGANIZED HEALTH CARE EDUCATION/TRAINING PROGRAM

## 2025-04-16 PROCEDURE — 25810000003 SODIUM CHLORIDE 0.9 % SOLUTION 250 ML FLEX CONT: Performed by: REGISTERED NURSE

## 2025-04-16 PROCEDURE — 88309 TISSUE EXAM BY PATHOLOGIST: CPT | Performed by: STUDENT IN AN ORGANIZED HEALTH CARE EDUCATION/TRAINING PROGRAM

## 2025-04-16 PROCEDURE — 25010000002 HEPARIN (PORCINE) PER 1000 UNITS: Performed by: STUDENT IN AN ORGANIZED HEALTH CARE EDUCATION/TRAINING PROGRAM

## 2025-04-16 PROCEDURE — 25010000002 DEXAMETHASONE PER 1 MG: Performed by: REGISTERED NURSE

## 2025-04-16 PROCEDURE — 25010000002 ONDANSETRON PER 1 MG: Performed by: REGISTERED NURSE

## 2025-04-16 PROCEDURE — 25810000003 LACTATED RINGERS PER 1000 ML: Performed by: REGISTERED NURSE

## 2025-04-16 PROCEDURE — 0TJB8ZZ INSPECTION OF BLADDER, VIA NATURAL OR ARTIFICIAL OPENING ENDOSCOPIC: ICD-10-PCS | Performed by: STUDENT IN AN ORGANIZED HEALTH CARE EDUCATION/TRAINING PROGRAM

## 2025-04-16 PROCEDURE — 25810000003 LACTATED RINGERS PER 1000 ML: Performed by: STUDENT IN AN ORGANIZED HEALTH CARE EDUCATION/TRAINING PROGRAM

## 2025-04-16 PROCEDURE — 45330 DIAGNOSTIC SIGMOIDOSCOPY: CPT | Performed by: STUDENT IN AN ORGANIZED HEALTH CARE EDUCATION/TRAINING PROGRAM

## 2025-04-16 PROCEDURE — 25810000003 SODIUM CHLORIDE 0.9 % SOLUTION: Performed by: REGISTERED NURSE

## 2025-04-16 PROCEDURE — 25010000002 LIDOCAINE PF 2% 2 % SOLUTION: Performed by: REGISTERED NURSE

## 2025-04-16 PROCEDURE — 25010000002 HYDROMORPHONE 1 MG/ML SOLUTION: Performed by: REGISTERED NURSE

## 2025-04-16 PROCEDURE — 0DJD8ZZ INSPECTION OF LOWER INTESTINAL TRACT, VIA NATURAL OR ARTIFICIAL OPENING ENDOSCOPIC: ICD-10-PCS | Performed by: STUDENT IN AN ORGANIZED HEALTH CARE EDUCATION/TRAINING PROGRAM

## 2025-04-16 PROCEDURE — 25010000002 FENTANYL CITRATE (PF) 100 MCG/2ML SOLUTION: Performed by: REGISTERED NURSE

## 2025-04-16 PROCEDURE — 25010000002 SUGAMMADEX 200 MG/2ML SOLUTION: Performed by: ANESTHESIOLOGY

## 2025-04-16 PROCEDURE — 8E0W4CZ ROBOTIC ASSISTED PROCEDURE OF TRUNK REGION, PERCUTANEOUS ENDOSCOPIC APPROACH: ICD-10-PCS | Performed by: STUDENT IN AN ORGANIZED HEALTH CARE EDUCATION/TRAINING PROGRAM

## 2025-04-16 PROCEDURE — 82948 REAGENT STRIP/BLOOD GLUCOSE: CPT

## 2025-04-16 PROCEDURE — 25010000002 PROPOFOL 10 MG/ML EMULSION: Performed by: REGISTERED NURSE

## 2025-04-16 PROCEDURE — 25010000002 MAGNESIUM SULFATE PER 500 MG OF MAGNESIUM: Performed by: REGISTERED NURSE

## 2025-04-16 PROCEDURE — 25010000002 INDOCYANINE GREEN 25 MG RECONSTITUTED SOLUTION: Performed by: STUDENT IN AN ORGANIZED HEALTH CARE EDUCATION/TRAINING PROGRAM

## 2025-04-16 PROCEDURE — P9041 ALBUMIN (HUMAN),5%, 50ML: HCPCS | Performed by: ANESTHESIOLOGY

## 2025-04-16 PROCEDURE — 44207 L COLECTOMY/COLOPROCTOSTOMY: CPT | Performed by: STUDENT IN AN ORGANIZED HEALTH CARE EDUCATION/TRAINING PROGRAM

## 2025-04-16 PROCEDURE — 25810000003 SODIUM CHLORIDE 0.9 % SOLUTION: Performed by: STUDENT IN AN ORGANIZED HEALTH CARE EDUCATION/TRAINING PROGRAM

## 2025-04-16 PROCEDURE — 25010000002 CEFTRIAXONE PER 250 MG: Performed by: STUDENT IN AN ORGANIZED HEALTH CARE EDUCATION/TRAINING PROGRAM

## 2025-04-16 PROCEDURE — C1769 GUIDE WIRE: HCPCS | Performed by: STUDENT IN AN ORGANIZED HEALTH CARE EDUCATION/TRAINING PROGRAM

## 2025-04-16 PROCEDURE — 52005 CYSTO W/URTRL CATHJ: CPT | Performed by: STUDENT IN AN ORGANIZED HEALTH CARE EDUCATION/TRAINING PROGRAM

## 2025-04-16 PROCEDURE — 0TT14ZZ RESECTION OF LEFT KIDNEY, PERCUTANEOUS ENDOSCOPIC APPROACH: ICD-10-PCS | Performed by: UROLOGY

## 2025-04-16 PROCEDURE — 44207 L COLECTOMY/COLOPROCTOSTOMY: CPT

## 2025-04-16 PROCEDURE — 88305 TISSUE EXAM BY PATHOLOGIST: CPT | Performed by: STUDENT IN AN ORGANIZED HEALTH CARE EDUCATION/TRAINING PROGRAM

## 2025-04-16 PROCEDURE — 25010000002 PHENYLEPHRINE 10 MG/ML SOLUTION 5 ML VIAL: Performed by: REGISTERED NURSE

## 2025-04-16 DEVICE — ABSORBABLE WOUND CLOSURE DEVICE
Type: IMPLANTABLE DEVICE | Site: ABDOMEN | Status: FUNCTIONAL
Brand: V-LOC 180

## 2025-04-16 DEVICE — DEV WND/CLS CONTRL TISS STRATAFIX SYMM PDS PLS CTX 60CM VIL: Type: IMPLANTABLE DEVICE | Site: ABDOMEN | Status: FUNCTIONAL

## 2025-04-16 DEVICE — STAPLER 60 RELOAD WHITE
Type: IMPLANTABLE DEVICE | Site: ABDOMEN | Status: FUNCTIONAL
Brand: SUREFORM

## 2025-04-16 DEVICE — CELLULAR STAPLER WITH TRI-STAPLE TECHNOLOGY
Type: IMPLANTABLE DEVICE | Site: ABDOMEN | Status: FUNCTIONAL
Brand: EEA

## 2025-04-16 DEVICE — STAPLER 60 RELOAD BLUE
Type: IMPLANTABLE DEVICE | Site: ABDOMEN | Status: FUNCTIONAL
Brand: SUREFORM

## 2025-04-16 DEVICE — FLOSEAL WITH RECOTHROM - 10ML.
Type: IMPLANTABLE DEVICE | Site: ABDOMEN | Status: FUNCTIONAL
Brand: FLOSEAL HEMOSTATIC MATRIX

## 2025-04-16 DEVICE — STAPLER 60 RELOAD GREEN
Type: IMPLANTABLE DEVICE | Site: ABDOMEN | Status: FUNCTIONAL
Brand: SUREFORM

## 2025-04-16 DEVICE — LARGE LIGATION CLIPS 6 CLIPS/CART
Type: IMPLANTABLE DEVICE | Site: ABDOMEN | Status: FUNCTIONAL
Brand: VAS-Q-CLIP

## 2025-04-16 RX ORDER — BUPIVACAINE HYDROCHLORIDE 2.5 MG/ML
INJECTION, SOLUTION EPIDURAL; INFILTRATION; INTRACAUDAL; PERINEURAL
Status: COMPLETED | OUTPATIENT
Start: 2025-04-16 | End: 2025-04-16

## 2025-04-16 RX ORDER — HEPARIN SODIUM 5000 [USP'U]/ML
5000 INJECTION, SOLUTION INTRAVENOUS; SUBCUTANEOUS EVERY 8 HOURS SCHEDULED
Status: DISCONTINUED | OUTPATIENT
Start: 2025-04-17 | End: 2025-04-18 | Stop reason: HOSPADM

## 2025-04-16 RX ORDER — LIDOCAINE HYDROCHLORIDE 20 MG/ML
INJECTION, SOLUTION EPIDURAL; INFILTRATION; INTRACAUDAL; PERINEURAL AS NEEDED
Status: DISCONTINUED | OUTPATIENT
Start: 2025-04-16 | End: 2025-04-16 | Stop reason: SURG

## 2025-04-16 RX ORDER — LIDOCAINE 4 G/G
2 PATCH TOPICAL
Status: DISCONTINUED | OUTPATIENT
Start: 2025-04-16 | End: 2025-04-18 | Stop reason: HOSPADM

## 2025-04-16 RX ORDER — ALVIMOPAN 12 MG/1
12 CAPSULE ORAL 2 TIMES DAILY
Status: DISCONTINUED | OUTPATIENT
Start: 2025-04-16 | End: 2025-04-18 | Stop reason: HOSPADM

## 2025-04-16 RX ORDER — EPHEDRINE SULFATE 5 MG/ML
5 INJECTION INTRAVENOUS ONCE AS NEEDED
Status: DISCONTINUED | OUTPATIENT
Start: 2025-04-16 | End: 2025-04-16

## 2025-04-16 RX ORDER — HEPARIN SODIUM 5000 [USP'U]/ML
5000 INJECTION, SOLUTION INTRAVENOUS; SUBCUTANEOUS ONCE
Status: COMPLETED | OUTPATIENT
Start: 2025-04-16 | End: 2025-04-16

## 2025-04-16 RX ORDER — DEXAMETHASONE SODIUM PHOSPHATE 4 MG/ML
INJECTION, SOLUTION INTRA-ARTICULAR; INTRALESIONAL; INTRAMUSCULAR; INTRAVENOUS; SOFT TISSUE AS NEEDED
Status: DISCONTINUED | OUTPATIENT
Start: 2025-04-16 | End: 2025-04-16 | Stop reason: SURG

## 2025-04-16 RX ORDER — ONDANSETRON 2 MG/ML
4 INJECTION INTRAMUSCULAR; INTRAVENOUS ONCE AS NEEDED
Status: DISCONTINUED | OUTPATIENT
Start: 2025-04-16 | End: 2025-04-16

## 2025-04-16 RX ORDER — PROPOFOL 10 MG/ML
VIAL (ML) INTRAVENOUS AS NEEDED
Status: DISCONTINUED | OUTPATIENT
Start: 2025-04-16 | End: 2025-04-16 | Stop reason: SURG

## 2025-04-16 RX ORDER — ROCURONIUM BROMIDE 10 MG/ML
INJECTION, SOLUTION INTRAVENOUS AS NEEDED
Status: DISCONTINUED | OUTPATIENT
Start: 2025-04-16 | End: 2025-04-16 | Stop reason: SURG

## 2025-04-16 RX ORDER — IPRATROPIUM BROMIDE AND ALBUTEROL SULFATE 2.5; .5 MG/3ML; MG/3ML
3 SOLUTION RESPIRATORY (INHALATION) ONCE AS NEEDED
Status: DISCONTINUED | OUTPATIENT
Start: 2025-04-16 | End: 2025-04-16

## 2025-04-16 RX ORDER — BUPIVACAINE HYDROCHLORIDE 2.5 MG/ML
INJECTION, SOLUTION EPIDURAL; INFILTRATION; INTRACAUDAL; PERINEURAL AS NEEDED
Status: DISCONTINUED | OUTPATIENT
Start: 2025-04-16 | End: 2025-04-16 | Stop reason: HOSPADM

## 2025-04-16 RX ORDER — OXYCODONE HYDROCHLORIDE 5 MG/1
5 TABLET ORAL ONCE AS NEEDED
Status: DISCONTINUED | OUTPATIENT
Start: 2025-04-16 | End: 2025-04-16

## 2025-04-16 RX ORDER — ALVIMOPAN 12 MG/1
12 CAPSULE ORAL ONCE
Status: COMPLETED | OUTPATIENT
Start: 2025-04-16 | End: 2025-04-16

## 2025-04-16 RX ORDER — SODIUM CHLORIDE 9 MG/ML
20 INJECTION, SOLUTION INTRAVENOUS ONCE
Status: COMPLETED | OUTPATIENT
Start: 2025-04-16 | End: 2025-04-16

## 2025-04-16 RX ORDER — OXYCODONE HYDROCHLORIDE 5 MG/1
10 TABLET ORAL EVERY 4 HOURS PRN
Status: DISCONTINUED | OUTPATIENT
Start: 2025-04-16 | End: 2025-04-16

## 2025-04-16 RX ORDER — OXYCODONE HYDROCHLORIDE 5 MG/1
7.5 TABLET ORAL EVERY 4 HOURS PRN
Status: DISCONTINUED | OUTPATIENT
Start: 2025-04-16 | End: 2025-04-18 | Stop reason: HOSPADM

## 2025-04-16 RX ORDER — PREGABALIN 25 MG/1
25 CAPSULE ORAL EVERY 8 HOURS SCHEDULED
Status: DISCONTINUED | OUTPATIENT
Start: 2025-04-16 | End: 2025-04-18 | Stop reason: HOSPADM

## 2025-04-16 RX ORDER — HYDRALAZINE HYDROCHLORIDE 20 MG/ML
5 INJECTION INTRAMUSCULAR; INTRAVENOUS
Status: DISCONTINUED | OUTPATIENT
Start: 2025-04-16 | End: 2025-04-16

## 2025-04-16 RX ORDER — ALBUMIN HUMAN 50 G/1000ML
SOLUTION INTRAVENOUS CONTINUOUS PRN
Status: DISCONTINUED | OUTPATIENT
Start: 2025-04-16 | End: 2025-04-16 | Stop reason: SURG

## 2025-04-16 RX ORDER — HYDROMORPHONE HYDROCHLORIDE 1 MG/ML
0.25 INJECTION, SOLUTION INTRAMUSCULAR; INTRAVENOUS; SUBCUTANEOUS
Status: DISCONTINUED | OUTPATIENT
Start: 2025-04-16 | End: 2025-04-18 | Stop reason: HOSPADM

## 2025-04-16 RX ORDER — ONDANSETRON 2 MG/ML
4 INJECTION INTRAMUSCULAR; INTRAVENOUS EVERY 6 HOURS PRN
Status: DISCONTINUED | OUTPATIENT
Start: 2025-04-16 | End: 2025-04-18 | Stop reason: HOSPADM

## 2025-04-16 RX ORDER — FENTANYL CITRATE 50 UG/ML
INJECTION, SOLUTION INTRAMUSCULAR; INTRAVENOUS AS NEEDED
Status: DISCONTINUED | OUTPATIENT
Start: 2025-04-16 | End: 2025-04-16 | Stop reason: SURG

## 2025-04-16 RX ORDER — LIDOCAINE HYDROCHLORIDE 10 MG/ML
0.5 INJECTION, SOLUTION INFILTRATION; PERINEURAL ONCE AS NEEDED
Status: DISCONTINUED | OUTPATIENT
Start: 2025-04-16 | End: 2025-04-16 | Stop reason: HOSPADM

## 2025-04-16 RX ORDER — PREGABALIN 75 MG/1
75 CAPSULE ORAL ONCE
Status: COMPLETED | OUTPATIENT
Start: 2025-04-16 | End: 2025-04-16

## 2025-04-16 RX ORDER — INDOCYANINE GREEN AND WATER 25 MG
KIT INJECTION AS NEEDED
Status: DISCONTINUED | OUTPATIENT
Start: 2025-04-16 | End: 2025-04-16 | Stop reason: HOSPADM

## 2025-04-16 RX ORDER — ACETAMINOPHEN 500 MG
1000 TABLET ORAL EVERY 6 HOURS
Status: DISCONTINUED | OUTPATIENT
Start: 2025-04-16 | End: 2025-04-16 | Stop reason: HOSPADM

## 2025-04-16 RX ORDER — NALOXONE HCL 0.4 MG/ML
0.4 VIAL (ML) INJECTION
Status: DISCONTINUED | OUTPATIENT
Start: 2025-04-16 | End: 2025-04-18 | Stop reason: HOSPADM

## 2025-04-16 RX ORDER — ACETAMINOPHEN 500 MG
1000 TABLET ORAL EVERY 6 HOURS
Status: DISCONTINUED | OUTPATIENT
Start: 2025-04-16 | End: 2025-04-18 | Stop reason: HOSPADM

## 2025-04-16 RX ORDER — METRONIDAZOLE 500 MG/100ML
500 INJECTION, SOLUTION INTRAVENOUS ONCE
Status: COMPLETED | OUTPATIENT
Start: 2025-04-16 | End: 2025-04-16

## 2025-04-16 RX ORDER — MEPERIDINE HYDROCHLORIDE 25 MG/ML
12.5 INJECTION INTRAMUSCULAR; INTRAVENOUS; SUBCUTANEOUS
Status: DISCONTINUED | OUTPATIENT
Start: 2025-04-16 | End: 2025-04-16

## 2025-04-16 RX ORDER — FENTANYL CITRATE 50 UG/ML
50 INJECTION, SOLUTION INTRAMUSCULAR; INTRAVENOUS
Status: DISCONTINUED | OUTPATIENT
Start: 2025-04-16 | End: 2025-04-16

## 2025-04-16 RX ORDER — DIPHENHYDRAMINE HYDROCHLORIDE 50 MG/ML
12.5 INJECTION, SOLUTION INTRAMUSCULAR; INTRAVENOUS
Status: DISCONTINUED | OUTPATIENT
Start: 2025-04-16 | End: 2025-04-16

## 2025-04-16 RX ORDER — METHOCARBAMOL 750 MG/1
750 TABLET, FILM COATED ORAL 4 TIMES DAILY
Status: DISCONTINUED | OUTPATIENT
Start: 2025-04-16 | End: 2025-04-18 | Stop reason: HOSPADM

## 2025-04-16 RX ORDER — SODIUM CHLORIDE, SODIUM LACTATE, POTASSIUM CHLORIDE, CALCIUM CHLORIDE 600; 310; 30; 20 MG/100ML; MG/100ML; MG/100ML; MG/100ML
INJECTION, SOLUTION INTRAVENOUS CONTINUOUS PRN
Status: DISCONTINUED | OUTPATIENT
Start: 2025-04-16 | End: 2025-04-16 | Stop reason: SURG

## 2025-04-16 RX ORDER — DIPHENHYDRAMINE HYDROCHLORIDE 50 MG/ML
12.5 INJECTION, SOLUTION INTRAMUSCULAR; INTRAVENOUS ONCE AS NEEDED
Status: DISCONTINUED | OUTPATIENT
Start: 2025-04-16 | End: 2025-04-16

## 2025-04-16 RX ORDER — ONDANSETRON 2 MG/ML
INJECTION INTRAMUSCULAR; INTRAVENOUS AS NEEDED
Status: DISCONTINUED | OUTPATIENT
Start: 2025-04-16 | End: 2025-04-16 | Stop reason: SURG

## 2025-04-16 RX ORDER — SODIUM CHLORIDE, SODIUM LACTATE, POTASSIUM CHLORIDE, CALCIUM CHLORIDE 600; 310; 30; 20 MG/100ML; MG/100ML; MG/100ML; MG/100ML
50 INJECTION, SOLUTION INTRAVENOUS CONTINUOUS
Status: DISPENSED | OUTPATIENT
Start: 2025-04-16 | End: 2025-04-17

## 2025-04-16 RX ORDER — SODIUM CHLORIDE 0.9 % (FLUSH) 0.9 %
10 SYRINGE (ML) INJECTION AS NEEDED
Status: DISCONTINUED | OUTPATIENT
Start: 2025-04-16 | End: 2025-04-16 | Stop reason: HOSPADM

## 2025-04-16 RX ORDER — NALOXONE HCL 0.4 MG/ML
0.4 VIAL (ML) INJECTION AS NEEDED
Status: DISCONTINUED | OUTPATIENT
Start: 2025-04-16 | End: 2025-04-16

## 2025-04-16 RX ORDER — MAGNESIUM SULFATE HEPTAHYDRATE 500 MG/ML
INJECTION, SOLUTION INTRAMUSCULAR; INTRAVENOUS AS NEEDED
Status: DISCONTINUED | OUTPATIENT
Start: 2025-04-16 | End: 2025-04-16 | Stop reason: SURG

## 2025-04-16 RX ORDER — SODIUM CHLORIDE 9 MG/ML
INJECTION, SOLUTION INTRAVENOUS CONTINUOUS PRN
Status: DISCONTINUED | OUTPATIENT
Start: 2025-04-16 | End: 2025-04-16 | Stop reason: SURG

## 2025-04-16 RX ORDER — LABETALOL HYDROCHLORIDE 5 MG/ML
5 INJECTION, SOLUTION INTRAVENOUS
Status: DISCONTINUED | OUTPATIENT
Start: 2025-04-16 | End: 2025-04-16

## 2025-04-16 RX ADMIN — MAGNESIUM SULFATE HEPTAHYDRATE 2 G: 500 INJECTION, SOLUTION INTRAMUSCULAR; INTRAVENOUS at 13:00

## 2025-04-16 RX ADMIN — ONDANSETRON 4 MG: 2 INJECTION, SOLUTION INTRAMUSCULAR; INTRAVENOUS at 18:11

## 2025-04-16 RX ADMIN — SODIUM CHLORIDE: 9 INJECTION, SOLUTION INTRAVENOUS at 12:30

## 2025-04-16 RX ADMIN — ROCURONIUM BROMIDE 20 MG: 10 INJECTION, SOLUTION INTRAVENOUS at 14:11

## 2025-04-16 RX ADMIN — ROCURONIUM BROMIDE 20 MG: 10 INJECTION, SOLUTION INTRAVENOUS at 17:02

## 2025-04-16 RX ADMIN — ROCURONIUM BROMIDE 20 MG: 10 INJECTION, SOLUTION INTRAVENOUS at 14:51

## 2025-04-16 RX ADMIN — HYDROMORPHONE HYDROCHLORIDE 1 MG: 1 INJECTION, SOLUTION INTRAMUSCULAR; INTRAVENOUS; SUBCUTANEOUS at 20:23

## 2025-04-16 RX ADMIN — BUPIVACAINE 20 ML: 13.3 INJECTION, SUSPENSION, LIPOSOMAL INFILTRATION at 12:41

## 2025-04-16 RX ADMIN — ALVIMOPAN 12 MG: 12 CAPSULE ORAL at 22:33

## 2025-04-16 RX ADMIN — ALBUMIN (HUMAN): 12.5 INJECTION, SOLUTION INTRAVENOUS at 19:13

## 2025-04-16 RX ADMIN — SUGAMMADEX 200 MG: 100 INJECTION, SOLUTION INTRAVENOUS at 19:30

## 2025-04-16 RX ADMIN — CEFTRIAXONE 2000 MG: 2 INJECTION, POWDER, FOR SOLUTION INTRAMUSCULAR; INTRAVENOUS at 12:22

## 2025-04-16 RX ADMIN — HYDROMORPHONE HYDROCHLORIDE 1 MG: 1 INJECTION, SOLUTION INTRAMUSCULAR; INTRAVENOUS; SUBCUTANEOUS at 21:03

## 2025-04-16 RX ADMIN — SODIUM CHLORIDE, SODIUM LACTATE, POTASSIUM CHLORIDE, AND CALCIUM CHLORIDE: .6; .31; .03; .02 INJECTION, SOLUTION INTRAVENOUS at 12:30

## 2025-04-16 RX ADMIN — BUPIVACAINE HYDROCHLORIDE 40 ML: 2.5 INJECTION, SOLUTION EPIDURAL; INFILTRATION; INTRACAUDAL; PERINEURAL at 12:41

## 2025-04-16 RX ADMIN — PROPOFOL 50 MG: 10 INJECTION, EMULSION INTRAVENOUS at 19:27

## 2025-04-16 RX ADMIN — ACETAMINOPHEN 1000 MG: 500 TABLET, FILM COATED ORAL at 22:33

## 2025-04-16 RX ADMIN — ROCURONIUM BROMIDE 10 MG: 10 INJECTION, SOLUTION INTRAVENOUS at 18:09

## 2025-04-16 RX ADMIN — PREGABALIN 25 MG: 25 CAPSULE ORAL at 22:33

## 2025-04-16 RX ADMIN — FENTANYL CITRATE 100 MCG: 50 INJECTION, SOLUTION INTRAMUSCULAR; INTRAVENOUS at 12:33

## 2025-04-16 RX ADMIN — INDOCYANINE GREEN AND WATER 7.5 MG: KIT at 17:56

## 2025-04-16 RX ADMIN — CEFTRIAXONE SODIUM 2 G: 2 INJECTION, POWDER, FOR SOLUTION INTRAMUSCULAR; INTRAVENOUS at 13:04

## 2025-04-16 RX ADMIN — ROCURONIUM BROMIDE 20 MG: 10 INJECTION, SOLUTION INTRAVENOUS at 16:01

## 2025-04-16 RX ADMIN — ACETAMINOPHEN 1000 MG: 500 TABLET, FILM COATED ORAL at 09:36

## 2025-04-16 RX ADMIN — ROCURONIUM BROMIDE 20 MG: 10 INJECTION, SOLUTION INTRAVENOUS at 15:23

## 2025-04-16 RX ADMIN — HYDROMORPHONE HYDROCHLORIDE 0.5 MG: 1 INJECTION, SOLUTION INTRAMUSCULAR; INTRAVENOUS; SUBCUTANEOUS at 16:18

## 2025-04-16 RX ADMIN — HEPARIN SODIUM 5000 UNITS: 5000 INJECTION INTRAVENOUS; SUBCUTANEOUS at 12:23

## 2025-04-16 RX ADMIN — DEXAMETHASONE SODIUM PHOSPHATE 8 MG: 4 INJECTION, SOLUTION INTRAMUSCULAR; INTRAVENOUS at 13:00

## 2025-04-16 RX ADMIN — ROCURONIUM BROMIDE 30 MG: 10 INJECTION, SOLUTION INTRAVENOUS at 15:56

## 2025-04-16 RX ADMIN — LIDOCAINE HYDROCHLORIDE 100 MG: 20 INJECTION, SOLUTION EPIDURAL; INFILTRATION; INTRACAUDAL; PERINEURAL at 12:33

## 2025-04-16 RX ADMIN — ROCURONIUM BROMIDE 10 MG: 10 INJECTION, SOLUTION INTRAVENOUS at 17:50

## 2025-04-16 RX ADMIN — HYDROMORPHONE HYDROCHLORIDE 0.5 MG: 1 INJECTION, SOLUTION INTRAMUSCULAR; INTRAVENOUS; SUBCUTANEOUS at 14:38

## 2025-04-16 RX ADMIN — PROPOFOL 180 MG: 10 INJECTION, EMULSION INTRAVENOUS at 12:33

## 2025-04-16 RX ADMIN — CEFTRIAXONE SODIUM 2 G: 2 INJECTION, POWDER, FOR SOLUTION INTRAMUSCULAR; INTRAVENOUS at 12:54

## 2025-04-16 RX ADMIN — ALVIMOPAN 12 MG: 12 CAPSULE ORAL at 09:36

## 2025-04-16 RX ADMIN — SODIUM CHLORIDE 20 ML/HR: 9 INJECTION, SOLUTION INTRAVENOUS at 10:02

## 2025-04-16 RX ADMIN — SODIUM CHLORIDE, POTASSIUM CHLORIDE, SODIUM LACTATE AND CALCIUM CHLORIDE 50 ML/HR: 600; 310; 30; 20 INJECTION, SOLUTION INTRAVENOUS at 21:29

## 2025-04-16 RX ADMIN — PHENYLEPHRINE HYDROCHLORIDE 0.5 MCG/KG/MIN: 10 INJECTION INTRAVENOUS at 12:55

## 2025-04-16 RX ADMIN — ROCURONIUM BROMIDE 50 MG: 10 INJECTION, SOLUTION INTRAVENOUS at 12:33

## 2025-04-16 RX ADMIN — METRONIDAZOLE 500 MG: 500 INJECTION, SOLUTION INTRAVENOUS at 12:40

## 2025-04-16 RX ADMIN — ROCURONIUM BROMIDE 20 MG: 10 INJECTION, SOLUTION INTRAVENOUS at 13:28

## 2025-04-16 RX ADMIN — METHOCARBAMOL TABLETS 750 MG: 750 TABLET, COATED ORAL at 22:33

## 2025-04-16 RX ADMIN — PREGABALIN 75 MG: 75 CAPSULE ORAL at 09:36

## 2025-04-16 RX ADMIN — ROCURONIUM BROMIDE 20 MG: 10 INJECTION, SOLUTION INTRAVENOUS at 16:30

## 2025-04-16 RX ADMIN — ROCURONIUM BROMIDE 20 MG: 10 INJECTION, SOLUTION INTRAVENOUS at 17:30

## 2025-04-16 NOTE — OP NOTE
Urology Operative Note    4/16/2025    Vance Pearl Jr.  54 y.o.  1970  male  [id number]      Surgeon(s) and Role:  Leno Zaidi MD - Primary     Preoperative Diagnosis: 7cm left renal mass    Postoperative Diagnosis: Same    Complications: None    Procedures:    Left robotic-assisted laparoscopic radical nephrectomy    Indications   Vance Pearl Jr. is a 54 y.o. male who presented with left renal mass found on workup for colon mass.  Mass was 7 cm and central, discussed options and he elected for combination of surgery of colectomy with left radical nephrectomy robotic assisted possible open    During the informed consent process, the procedure was discussed in detail including the risks of bleeding, infection, and damage to surrounding structures.    Findings     Description of procedure:  The patient was properly identified in the preoperative holding area and taken to the operating room where general anesthesia was induced. Time out taken.   All pressure points carefully padded.  Dr. Peoples started the procedure and placed the ports in his preferred position.  Once the colon was mobilized I sat down at the robotic console and started the nephrectomy.    The lower pole was mobilized further by identifying the psoas tendon.  More medially the ureter was easily identified and encircled.  The ureter was divided.  The lower pole was further mobilized along the aorta to the level of the renal vein.  I then mobilized the Gerota's fascia off  incised the peritoneum along the upper pole.  I was then able to divide some fat above the artery and vein, and I was able to bluntly develop a space above the hilum large enough to allow passage of the vascular stapler.  The entire hilum was stapled using the vascular load.  Good hemostasis was observed.  I completed the nephrectomy by continuing to mobilize the upper pole, which was quite stuck, careful dissection was performed to ensure none of the mass was left  behind.  Finally the lateral attachments were divided and the specimen was free.  It was placed in a laparoscopic bag. Floseal was placed on the hilum.  The case was turned back to Dr. Peoples for completion of his anastomosis and extraction of both specimens    There were no apparent complications.    I was present and scrubbed for the entire procedure.     Specimens: Left kidney    Estimated Blood Loss: 100mL           Leno Zaidi MD  [facility]  Formerly [facility] [facility], [facility] [address]  [place] [address] [phone]

## 2025-04-16 NOTE — ANESTHESIA PREPROCEDURE EVALUATION
Anesthesia Evaluation     Patient summary reviewed and Nursing notes reviewed   NPO Solid Status: > 8 hours  NPO Liquid Status: > 8 hours           Airway   Mallampati: I  TM distance: >3 FB  Neck ROM: full  No difficulty expected  Dental - normal exam     Pulmonary - normal exam   (+) ,sleep apnea  Cardiovascular - normal exam  Exercise tolerance: unable to assess    ECG reviewed    (+) hypertension      Neuro/Psych- negative ROS  GI/Hepatic/Renal/Endo    (+) obesity, GERD    Musculoskeletal (-) negative ROS    Abdominal  - normal exam    Bowel sounds: normal.   Substance History - negative use     OB/GYN negative ob/gyn ROS         Other      history of cancer active                Anesthesia Plan    ASA 3     general with block     intravenous induction     Anesthetic plan, risks, benefits, and alternatives have been provided, discussed and informed consent has been obtained with: patient.    Plan discussed with CRNA.    CODE STATUS:

## 2025-04-16 NOTE — ANESTHESIA PROCEDURE NOTES
Airway  Reason: elective    Date/Time: 4/16/2025 12:34 PM    General Information and Staff    Patient location during procedure: OR  Anesthesiologist: Tricia Cat MD  CRNA/CAA: Suma Jones CRNA    Indications and Patient Condition  Indications for airway management: airway protection    Preoxygenated: yes  MILS not maintained throughout    Mask difficulty assessment: 2 - vent by mask + OA or adjuvant +/- NMBA    Final Airway Details    Final airway type: endotracheal airway      Successful airway: ETT  Cuffed: yes   Successful intubation technique: video laryngoscopy  Adjuncts used in placement: intubating stylet  Endotracheal tube insertion site: oral  Blade: Pinto  Blade size: 3  ETT size (mm): 7.5  Cormack-Lehane Classification: grade I - full view of glottis  Placement verified by: chest auscultation and capnometry   Measured from: teeth  ETT/EBT  to teeth (cm): 23  Number of attempts at approach: 1  Assessment: lips, teeth, and gum same as pre-op and atraumatic intubation

## 2025-04-16 NOTE — ANESTHESIA PROCEDURE NOTES
Peripheral Block      Patient reassessed immediately prior to procedure    Patient location during procedure: OR  Start time: 4/16/2025 12:36 PM  Stop time: 4/16/2025 12:41 PM  Reason for block: at surgeon's request and post-op pain management  Performed by  Anesthesiologist: Tricia Cat MD  Assisted by: Trevin Guzman RN  Preanesthetic Checklist  Completed: patient identified, IV checked, site marked, risks and benefits discussed, surgical consent, monitors and equipment checked, pre-op evaluation and timeout performed  Prep:  Pt Position: supine  Sterile barriers:cap, gloves and sterile barriers  Prep: ChloraPrep  Patient monitoring: blood pressure monitoring, continuous pulse oximetry and EKG  Procedure  Performed under: general  Guidance:ultrasound guided  Images:still images obtained, printed/placed on chart    Laterality:right  Block Type:rectus sheath and TAP  Injection Technique:single-shot  Needle Type:echogenic  Needle Gauge:20 G      Medications Used: bupivacaine PF (MARCAINE) 0.25 % injection - Perineural   40 mL - 4/16/2025 12:41:00 PM  bupivacaine liposome (EXPAREL) injection 1.3% - Perineural   20 mL - 4/16/2025 12:41:00 PM      Medications  Preservative Free Saline:30ml    Post Assessment  Injection Assessment: negative aspiration for heme and incremental injection  Patient Tolerance:comfortable throughout block  Complications:no  Additional Notes  30 mL of solution injected to Rt subcostal TAP and Rt rectus sheath with serial negative aspiration  Good spread noted under direct ultrasound guidance  No complications noted  Performed by: Tricia Cat MD

## 2025-04-17 PROCEDURE — 97162 PT EVAL MOD COMPLEX 30 MIN: CPT

## 2025-04-17 PROCEDURE — 25010000002 HYDROMORPHONE PER 4 MG: Performed by: STUDENT IN AN ORGANIZED HEALTH CARE EDUCATION/TRAINING PROGRAM

## 2025-04-17 PROCEDURE — 25010000002 HEPARIN (PORCINE) PER 1000 UNITS: Performed by: STUDENT IN AN ORGANIZED HEALTH CARE EDUCATION/TRAINING PROGRAM

## 2025-04-17 PROCEDURE — 99024 POSTOP FOLLOW-UP VISIT: CPT | Performed by: STUDENT IN AN ORGANIZED HEALTH CARE EDUCATION/TRAINING PROGRAM

## 2025-04-17 PROCEDURE — 97161 PT EVAL LOW COMPLEX 20 MIN: CPT

## 2025-04-17 RX ADMIN — ALVIMOPAN 12 MG: 12 CAPSULE ORAL at 08:48

## 2025-04-17 RX ADMIN — HYDROMORPHONE HYDROCHLORIDE 0.25 MG: 1 INJECTION, SOLUTION INTRAMUSCULAR; INTRAVENOUS; SUBCUTANEOUS at 21:02

## 2025-04-17 RX ADMIN — METHOCARBAMOL TABLETS 750 MG: 750 TABLET, COATED ORAL at 17:48

## 2025-04-17 RX ADMIN — HYDROMORPHONE HYDROCHLORIDE 0.25 MG: 1 INJECTION, SOLUTION INTRAMUSCULAR; INTRAVENOUS; SUBCUTANEOUS at 02:07

## 2025-04-17 RX ADMIN — ACETAMINOPHEN 1000 MG: 500 TABLET, FILM COATED ORAL at 02:07

## 2025-04-17 RX ADMIN — ALVIMOPAN 12 MG: 12 CAPSULE ORAL at 20:22

## 2025-04-17 RX ADMIN — PREGABALIN 25 MG: 25 CAPSULE ORAL at 05:33

## 2025-04-17 RX ADMIN — HEPARIN SODIUM 5000 UNITS: 5000 INJECTION INTRAVENOUS; SUBCUTANEOUS at 05:33

## 2025-04-17 RX ADMIN — OXYCODONE 7.5 MG: 5 TABLET ORAL at 00:52

## 2025-04-17 RX ADMIN — METHOCARBAMOL TABLETS 750 MG: 750 TABLET, COATED ORAL at 08:49

## 2025-04-17 RX ADMIN — PREGABALIN 25 MG: 25 CAPSULE ORAL at 22:49

## 2025-04-17 RX ADMIN — METHOCARBAMOL TABLETS 750 MG: 750 TABLET, COATED ORAL at 20:22

## 2025-04-17 RX ADMIN — OXYCODONE 7.5 MG: 5 TABLET ORAL at 08:55

## 2025-04-17 RX ADMIN — METHOCARBAMOL TABLETS 750 MG: 750 TABLET, COATED ORAL at 13:29

## 2025-04-17 RX ADMIN — PREGABALIN 25 MG: 25 CAPSULE ORAL at 13:29

## 2025-04-17 RX ADMIN — ACETAMINOPHEN 1000 MG: 500 TABLET, FILM COATED ORAL at 13:29

## 2025-04-17 RX ADMIN — HEPARIN SODIUM 5000 UNITS: 5000 INJECTION INTRAVENOUS; SUBCUTANEOUS at 22:49

## 2025-04-17 RX ADMIN — OXYCODONE 7.5 MG: 5 TABLET ORAL at 17:47

## 2025-04-17 RX ADMIN — HEPARIN SODIUM 5000 UNITS: 5000 INJECTION INTRAVENOUS; SUBCUTANEOUS at 13:29

## 2025-04-17 RX ADMIN — ACETAMINOPHEN 1000 MG: 500 TABLET, FILM COATED ORAL at 08:48

## 2025-04-17 NOTE — BRIEF OP NOTE
COLON RESECTION LOW ANTERIOR LAPAROSCOPIC WITH DAVINCI ROBOT, CYSTOSCOPY  Progress Note    Vance Pearl Jr.  4/16/2025    Pre-op Diagnosis:   Cancer of sigmoid [C18.7]       Post-Op Diagnosis Codes:     * Cancer of sigmoid [C18.7]    Procedure(s):      Procedure(s):  COLON RESECTION LOW ANTERIOR LAPAROSCOPIC WITH DAVINCI ROBOT  CYSTOSCOPY, ICG INJECTION INTO URETERS, CARLSON INSERTION  SIGMOIDOSCOPY FLEXIBLE  RADICAL NEPHRECTOMY LAPAROSCOPIC WITH DAVINCI ROBOT              Surgeon(s):  Leno Zaidi MD Kmetz, Daniel, MD Almaz, Biruk, MD Almaz, Biruk, MD Almaz, Biruk, MD Almaz, Biruk, MD    Anesthesia: General with Block    Staff:   Circulator: Jaden Caro, KEYANA; Pati Fung RN; Hugh Ervin RN  Physician Assistant: Mary Baez PA-C  Scrub Person: Ramsey Lyons; Kiya Kenny  Assistant: Erika Sawyer CSFA; Codie De Luna, APRN; Jayden Fan CSA  Assistant: Erika Sawyer CSFA; Codie De Luna, APRN; Jayden aFn, CSA    Estimated Blood Loss: 200 mL    Urine Voided: 400 mL    Specimens:                Specimens       ID Source Type Tests Collected By Collected At Frozen?    A Kidney, Left Tissue TISSUE PATHOLOGY EXAM   Arturo Peoples MD 4/16/25 1440     Description: LEFT RADICAL NEPHRECTOMY    B Large Intestine, Sigmoid Colon Tissue TISSUE PATHOLOGY EXAM   Arturo Peoples MD 4/16/25 1441     C Large Intestine, Sigmoid Colon Tissue TISSUE PATHOLOGY EXAM   Arturo Peoples MD 4/16/25 1826     Description: PROXIMAL DONUT    D Large Intestine, Sigmoid Colon Tissue TISSUE PATHOLOGY EXAM   Arturo Peoples MD 4/16/25 1828     Description: DISTAL DONUT              Drains:   Closed/Suction Drain 1 LUQ Bulb 19 Fr. (Active)   Dressing Status Clean;Dry;Intact 04/16/25 1929   Status To bulb suction 04/16/25 1929       Urethral Catheter Silicone 16 Fr. (Active)       Findings:     Distal rectal mass   Negative leak test   Good perfusion with ICG       Complications: none     Assistant:  Erika Sawyer, NEIL; Codie De Luna, APRN; Jayden Fan, JANINA  was responsible for performing the following activities: Retraction, Suction, Irrigation, Suturing, Closing, and Placing Dressing and their skilled assistance was necessary for the success of this case.    Arturo Peoples MD     Date: 4/16/2025  Time: 20:02 EDT

## 2025-04-17 NOTE — PLAN OF CARE
Goal Outcome Evaluation:      Patient doing well, pain treated per MAR, tolerating diet at this time, patient was able to ambulate around unit this shift, yevgeniy drain remains in place, will dc home when cleared ,care plan ongoing

## 2025-04-17 NOTE — PLAN OF CARE
Goal Outcome Evaluation:      Patient alert and oriented x4. Family at bedside. Pain treated per medication on the MAR. Patient ambulating in room, assist x 2 with a walker, tolerating well. Personal items and call light in reach. Plan of care ongoing.

## 2025-04-17 NOTE — PROGRESS NOTES
"  [facility] DAILY PROGRESS NOTE    Patient Identification  Name: Vance Pearl Jr.  Age: 54 y.o.  Sex: male  :  1970  MRN: [id number]    Date: 2025             Subjective:  Interval History: recovering well     Objective:    Scheduled Meds:acetaminophen, 1,000 mg, Oral, Q6H  alvimopan, 12 mg, Oral, BID  heparin (porcine), 5,000 Units, Subcutaneous, Q8H  Lidocaine, 2 patch, Transdermal, Q24H  methocarbamol, 750 mg, Oral, 4x Daily  pregabalin, 25 mg, Oral, Q8H      Continuous Infusions:lactated ringers, 50 mL/hr, Last Rate: 50 mL/hr (25)      PRN Meds:  HYDROmorphone **AND** naloxone    ondansetron    oxyCODONE    Vital signs in last 24 hours:  Temp:  [96.6 °F (35.9 °C)-98.3 °F (36.8 °C)] 98.3 °F (36.8 °C)  Heart Rate:  [75-98] 92  Resp:  [13-23] 18  BP: (113-143)/(75-97) 124/82    Intake/Output:    Intake/Output Summary (Last 24 hours) at 2025 1410  Last data filed at 2025 1203  Gross per 24 hour   Intake 2870 ml   Output 2240 ml   Net 630 ml       Exam:  /82 (BP Location: Right arm, Patient Position: Sitting)   Pulse 92   Temp 98.3 °F (36.8 °C) (Oral)   Resp 18   Ht 177.8 cm (70\")   Wt 112 kg (247 lb 6.4 oz)   SpO2 95%   BMI 35.50 kg/m²     General Appearance:    Alert, cooperative, NAD   Lungs:     Respirations unlabored, no audible wheezing    Heart:    No cyanosis   Abdomen:     Soft, ND    :    No suprapubic distention            Data Review:  All labs (24hrs):   Recent Results (from the past 24 hours)   POC Glucose Once    Collection Time: 25  7:52 PM    Specimen: Blood   Result Value Ref Range    Glucose 140 (H) 70 - 105 mg/dL      Imaging Results (Last 24 Hours)       ** No results found for the last 24 hours. **             Assessment:    Cancer of sigmoid      POD 1 robot L nephrectomy and colectomy    Plan:    Ok for narayanan out  Good UOP  Diet and discharge per surgery service  Will see him in office next 1-2 weeks to review pathology    Leno ROMERO" MD Dejuan  [facility]  [address] [facility] [address], [address]  [place] [address]  Office: [phone]  04/17/25  14:10 EDT

## 2025-04-17 NOTE — PROGRESS NOTES
Colorectal Surgery Progress Note    Pt. Name/Age/:  Vance Pearl Jr.   54 y.o.    1970         Med. Record Number:   [id number]  Date of admission:  2025      Service(s): Colorectal Surgery      Subjective    Doing well. He is eating and drinking. Karimi Catheter is in place. Pt is belching but has not had a bowel movement or passed flatus. He denies nausea at this time. Has been up and moving without assistance.     Objective  Vitals:     Patient Vitals for the past 24 hrs:   BP Temp Temp src Pulse Resp SpO2   25 0800 131/85 97.3 °F (36.3 °C) Oral 92 16 95 %   25 0414 117/75 98 °F (36.7 °C) Oral 89 15 97 %   25 0023 113/81 97.6 °F (36.4 °C) Oral 98 13 97 %   25 2126 143/97 97.3 °F (36.3 °C) Oral 90 14 94 %   25 140/92 97.5 °F (36.4 °C) -- 92 15 95 %   25 122/87 -- -- 84 21 92 %   25 132/82 -- -- 83 18 95 %   25 132/93 -- -- 75 20 92 %   25 138/93 -- -- 77 20 98 %   25 117/81 -- -- 82 21 100 %   25 134/86 -- -- 78 23 100 %   25 123/80 -- -- 78 23 100 %   25 120/77 96.6 °F (35.9 °C) Axillary 79 21 100 %           Wt. Admission: Weight: 113 kg (250 lb)     Wt. Current: Weight: 112 kg (247 lb 6.4 oz)   Body mass index is 35.5 kg/m².      I&O:  Intake/Output Summary (Last 24 hours) at 2025 1202  Last data filed at 2025 1057  Gross per 24 hour   Intake 3070 ml   Output 1920 ml   Net 1150 ml     04/15 1901 -  0700  In: 2830 [P.O.:480; I.V.:1900]  Out: 1860 [Urine:900; Drains:560]      Exam  General:  No acute distress, resting comfortably.  Cardiovascular: regular rate.  Respiratory: no increased work of breathing.  Abdomen:  Soft, appropriate incisional tenderness, non-distended. No diffuse guarding or rebound tenderness. Incisions c/d/I.  Extremities: warm, well-perfused extremities, no pitting edema.      Labs:     [unfilled]          Scheduled Meds:acetaminophen,  1,000 mg, Oral, Q6H  alvimopan, 12 mg, Oral, BID  heparin (porcine), 5,000 Units, Subcutaneous, Q8H  Lidocaine, 2 patch, Transdermal, Q24H  methocarbamol, 750 mg, Oral, 4x Daily  pregabalin, 25 mg, Oral, Q8H      Continuous Infusions:lactated ringers, 50 mL/hr, Last Rate: 50 mL/hr (04/16/25 2129)      PRN Meds:.  HYDROmorphone **AND** naloxone    ondansetron    oxyCODONE          Assessment  54 y.o. male s/p low anterior laparoscopic colon resection and left radical nephrectomy    Plan / Recommendations    Neuro/pain: scheduled tylenol, gabapentin, PRN oxycodone  CV: no active issues  Pulm: incentive spirometry x 10 per hour  Renal: Discontinue urinary catheter and continue to monitor UOP  FEN: HLIV, replete electrolytes PRN  GI: regular diet, nutritional supplements TID, monitor for flatus or bowel movements  ID: no indication for antibiotics  DVT/GI ppx: prophylactic lovenox  Dispo: continue care on floor,       12:02 EDT; 4/17/2025        Mary Baez PA-C  Colon and Rectal Surgery   [facility]

## 2025-04-17 NOTE — PERIOPERATIVE NURSING NOTE
Contacted Dr Peoples re: patient CARLINE drain. Pt had 270 cc out of bloody drainage. CARLINE still draining steadily. Per Dr Peoples the output is normal for the amount of fluid they instilled and washed out. Call Dr Peoples if patient becomes unstable

## 2025-04-17 NOTE — PLAN OF CARE
Goal Outcome Evaluation:  Plan of Care Reviewed With: patient           Outcome Evaluation: Vance Sheets Jr is a 55 yo M who presented to [facility] on 4/16 for 7 cm left renal mass concerning for renal cell carcinoma. Pt is POD #1 Left robotic-assisted laparoscopic radical nephrectomy and colon resection. Pt is A&Ox4 and reports he lives with his spouse and 2 adult children in a H with 1 KARUNA and is typically IND with ADLs/IADLs and mobility w/o AD. Today pt is SUP for transfers and ambulation ~250 ft with PT management of IV pole. Pt appears to be at functional baseline and is safe to return home without further skilled PT upon d/c. No acute IP PT warranted at this time, will sign off.    Anticipated Discharge Disposition (PT): home with assist

## 2025-04-17 NOTE — CASE MANAGEMENT/SOCIAL WORK
Discharge Planning Assessment   [facility]     Patient Name: Vance Pearl Jr.  MRN: [id number]  Today's Date: 4/17/2025    Admit Date: 4/16/2025    Plan: DC PLAN; Routine home with family     Discharge Needs Assessment       Row Name 04/17/25 1131       Living Environment    People in Home spouse    Current Living Arrangements home    Potentially Unsafe Housing Conditions none    In the past 12 months has the electric, gas, oil, or water company threatened to shut off services in your home? No    Primary Care Provided by self    Provides Primary Care For no one    Family Caregiver if Needed spouse    Quality of Family Relationships helpful;involved;supportive    Able to Return to Prior Arrangements yes       Resource/Environmental Concerns    Resource/Environmental Concerns none    Transportation Concerns none       Transportation Needs    In the past 12 months, has lack of transportation kept you from medical appointments or from getting medications? no    In the past 12 months, has lack of transportation kept you from meetings, work, or from getting things needed for daily living? No       Food Insecurity    Within the past 12 months, you worried that your food would run out before you got the money to buy more. Never true    Within the past 12 months, the food you bought just didn't last and you didn't have money to get more. Never true       Transition Planning    Patient/Family Anticipates Transition to home with family    Patient/Family Anticipated Services at Transition none    Transportation Anticipated car, drives self;family or friend will provide       Discharge Needs Assessment    Readmission Within the Last 30 Days no previous admission in last 30 days    Equipment Currently Used at Home none    Anticipated Changes Related to Illness none    Equipment Needed After Discharge none                   Discharge Plan       Row Name 04/17/25 1132       Plan    Plan DC PLAN; Routine home with family       Patient/Family in Agreement with Plan yes    Plan Comments CM met with patient at bedside, from routine home with family. Independent with ADL's no DME. PCP is Srikanth. [facility]. Agreeable to use [facility]. Able to afford medications and denies any issues with food or utilities. Denies any transportation issues, still drives and family will provide at time of discharge. Denies any HHC or SNF needs. Denies any concerns about return home.                    Expected Discharge Date and Time       Expected Discharge Date Expected Discharge Time    Apr 18, 2025            Demographic Summary       Row Name 04/17/25 1131       General Information    Admission Type inpatient    Arrived From emergency department    Required Notices Provided Important Message from Medicare    Referral Source admission list    Reason for Consult discharge planning    Preferred Language English       Contact Information    Permission Granted to Share Info With     Contact Information Obtained for                    Functional Status       Row Name 04/17/25 1131       Functional Status    Usual Activity Tolerance excellent    Current Activity Tolerance excellent       Physical Activity    On average, how many days per week do you engage in moderate to strenuous exercise (like a brisk walk)? 0 days    On average, how many minutes do you engage in exercise at this level? 0 min    Number of minutes of exercise per week 0       Assessment of Health Literacy    How often do you have someone help you read hospital materials? Sometimes    How often do you have problems learning about your medical condition because of difficulty understanding written information? Sometimes    How often do you have a problem understanding what is told to you about your medical condition? Sometimes    How confident are you filling out medical forms by yourself? Somewhat    Health Literacy Moderate       Functional Status, IADL     Medications independent    Meal Preparation independent    Housekeeping independent    Laundry independent    Shopping independent       Mental Status    General Appearance WDL WDL       Mental Status Summary    Recent Changes in Mental Status/Cognitive Functioning no changes                Met with patient in room wearing PPE:     Maintained distance greater than six feet and spent less than 15 minutes in the room.    Jeaneth Sky RN   Case Management  [phone]

## 2025-04-17 NOTE — NURSING NOTE
Orders in to keep patients narayanan cath and remove it. Reached out to Dr. Peoples and Dejuan to clarify orders. Keeping narayanan for now per Dr. Peoples.

## 2025-04-17 NOTE — ANESTHESIA POSTPROCEDURE EVALUATION
Patient: Vance Pearl Jr.    Procedure Summary       Date: 04/16/25 Room / Location: [facility] 08 / [facility] MAIN OR; [facility] ANESTHESIA    Anesthesia Start: 1230 Anesthesia Stop: 1948    Procedures:       COLON RESECTION LOW ANTERIOR LAPAROSCOPIC WITH DAVINCI ROBOT (Abdomen)      CYSTOSCOPY, ICG INJECTION INTO URETERS, CARLSON INSERTION (Urethra)      SIGMOIDOSCOPY FLEXIBLE (Anus)      RADICAL NEPHRECTOMY LAPAROSCOPIC WITH DAVINCI ROBOT (Left: Abdomen)      ANESTHESIA PERIPHERAL BLOCK Diagnosis:       Cancer of sigmoid      (Cancer of sigmoid [C18.7])    Scheduled Providers: Leno Zaidi MD; Arturo Peoples MD Provider: Suma Jones CRNA    Anesthesia Type: general with block ASA Status: 3            Anesthesia Type: general with block    Vitals  Vitals Value Taken Time   /85 04/16/25 20:09   Temp 96.6 °F (35.9 °C) 04/16/25 19:46   Pulse 77 04/16/25 20:13   Resp 21 04/16/25 20:01   SpO2 98 % 04/16/25 20:13   Vitals shown include unfiled device data.        Post Anesthesia Care and Evaluation    Patient location during evaluation: PACU  Patient participation: complete - patient participated  Level of consciousness: awake  Pain scale: See nurse's notes for pain score.  Pain management: adequate    Airway patency: patent  Anesthetic complications: No anesthetic complications  PONV Status: none  Cardiovascular status: acceptable  Respiratory status: acceptable and spontaneous ventilation  Hydration status: acceptable    Comments: Patient seen and examined postoperatively; vital signs stable; SpO2 greater than or equal to 90%; cardiopulmonary status stable; nausea/vomiting adequately controlled; pain adequately controlled; no apparent anesthesia complications; patient discharged from anesthesia care when discharge criteria were met

## 2025-04-17 NOTE — OP NOTE
Bed: 16  Expected date:   Expected time:   Means of arrival:   Comments:  triage   CRS Operative Note   Vance Pearl Jr.    Age/Gender 54 y.o. male MRN [id number]   Location [facility] OR Attending Arturo Peoples MD   Date of Surgery: 4/16/2025  Pre-op Diagnosis: Cancer of sigmoid [C18.7]   Post-op Diagnosis: same  Procedure:   Robotic low anterior resection   Flexible sigmoidoscopy   Cystoscopy with ICG injection   ICG use for perfusion check of the anastomosis  Surgeon: Arturo Peoples MD   Assistants: Erika Sawyer CSFA; Codie De Luna APRN; Jayden Fan, JANINA  was responsible for performing the following activities: Retraction, Suction, Irrigation, Suturing, Closing, and Placing Dressing and their skilled assistance was necessary for the success of this case.  Anesthesia: General and Local  IV Fluids: refer to anesthesia record  Estimated Blood Loss: 200 cc  Drains: none  Findings:     Distal rectal mass   Negative leak test   Good perfusion with ICG     Complications: No complications  Specimens: Sigmoid colon, left kidney  Grafts or implants: none      Indications  54 y.o. male who was found to have a distal sigmoid mass in which biopsy was consistent with invasive adenocarcinoma.  Staging workup with CT scan showed no gross metastatic disease, however, there was a large left renal mass which was consistent with renal cell carcinoma.  He is here for bladder resection and left nephrectomy    Description of Procedure  After appropriate consent including risks, benefits and alternatives, patient was brought to the OR, and placed in lithotomy position. Anesthesia was administered.  All delvis prominences were padded, antibiotics were given, and scds were placed.   The patient was prepped and drapped in usual sterile fashion.    The rigid cystoscopy was performed.  Left ureteral orifice was identified.  A 6 Maori Pollick catheter was inserted and advanced without difficulty.  5 cc ICG was instilled.  The Pollick catheter and rigid cystoscopy was then removed.  A Karimi  catheter was then inserted.    The abdomen was prepped and draped in the usual sterile fashion    A 6 cm pfannenstiel incision was made. Dissection was carried down to the level of anterior sheath with blunt and electrocautery dissection. Incision was made just on the anterior sheath, superior and inferior fascial flaps were raised being careful to preserve the rectus abdominis, and abdominal entry was obtained with a muscle-splitting technique.    A small wound protector and Arjun cap were placed. A 12 mm port was placed here and the abdomen insufflated to 15 mm Hg.    The abdomen was explored laparoscopically.  There was static disease identified in the liver or the abdominal wall.      Local anesthetic was applied at the level of peritoneum, 10 cc for each port. A total of three 8mm and one 12mm robotic ports were placed under direct vision in a transverse line from the LUQ to the RLQ. The right-most port was 12 mm. An Airseal port was placed via the Arjun cap.     The patient was positioned in steep trendelenberg and right side down. The small bowel was moved out of the pelvis with laparoscopic bowel graspers. The Robot Xi was docked to the left of the patient.     2 tattoos were identified and sigmoid colon.  The distal tattoo marked just distal from the sigmoid mass.  Additional tattoo was noted in the proximal sigmoid which marked a benign polyp that was removed.    The peritoneum along the right side of the rectosigmoid was incised with monopolar scissors. The left colon was mobilized off the retroperitoneum in a medial to lateral technique. The left ureter was identified and dissected away from the mesentery. The inferior mesenteric artery and vein were isolated and divided with the robotic vessel sealer at its origin. The dissection was carried laterally towards the abdominal wall.  The dissection continued from the lateral to medial approach, dissecting the white line of toldt and again, identifying the  left ureter. Then the lateral peritoneal attachments to the rectum were then incised with cautery.     The presacral space was developed in the Wholey plane and this dissection was carried towards the pelvis.       A distal transection point was chosen at least 7 cm from the distal tattoo which was about 10 cm from the anal verge. The mesentery of the rectum was divided with vessel sealer to the colon wall. The rectum was divided with tow firing of 60 mm robotic green load stapler .    We continued my dissection proximally.  The left colon was completely mobilized off the retroperitoneum and of the kidney.  There was some omentum wrapping around the proximal left colon and the kidney.  This was mobilized off and excised.    Dr. Zaidi then took over and completed his left nephrectomy.  The left kidney was placed in the Endo Catch bag.  This was left in the right lower quadrant.      A proximal transection point proximal at least 7 cm away from the mass was chosen. The mesentery of the distal descending colon was divided with vessel sealer to the colon  wall. IcG was then injected to ensure adequate perfusion proximal to the level of our desired proximal transection point. IcG also ensured adequate perfusion to the rectal stump.   In preparation for intra-corporeal anastomosis, a colotomy was created in the specimen, large enough to pass the anvil. 4 cm proximal to the colotomy a small colotomy was made. The avil was dropped into the abdominal cavity via the pfannenstiel incision. A empty clip applier was used to grasp and place the anvil into the colon. This was gently moved proximally and the anvil guided out of the colon.   Proximal transection of colon was completed with a 60 mm robotic green load stapler. A 28 mm EEA stapler was then passed per rectum and mated with the anvil. Stapler was fired and the presence of two complete donuts were confirmed.  Flexible sigmoidoscopy showed a colorectal anastomosis that was  hemostatic. Air leak test was negative.  The abdomen was exsufflated and robot was undocked.  We then identified the stapled colon end and brought out the specimen via the pfannenstiel incision.  The left kidney with the Endo Catch bag was then removed.  The RLQ 12 mm port was closed with a 2-0 vicryl passed with a Todd-Oziel needle under laparoscopic guidance. The pfannenstiel incision was closed with #1 stratifix and irrigated. Skin was closed with skin stapler.      The rest of the local anesthetic was applied and the case concluded.    Counts: Instrument, sponge, and needle counts were reported to be correct prior to closure and at the conclusion of the case.  Disposition  The patient was taken to Recovery Room in good condition.      Arturo Peoples MD  Colon and Rectal Surgery   [facility]

## 2025-04-17 NOTE — THERAPY EVALUATION
Patient Name: Vance Pearl Jr.  : 1970    MRN: [id number]                              Today's Date: 2025       Admit Date: 2025    Visit Dx:     ICD-10-CM ICD-9-CM   1. Cancer of sigmoid  C18.7 153.3   2. Left kidney mass  N28.89 593.9     Patient Active Problem List   Diagnosis    Primary hypertension    Obesity (BMI 30-39.9)    Encounter for annual physical exam    Positive colorectal cancer screening using Cologuard test    Cancer of sigmoid    ACE-inhibitor cough    Renal cell carcinoma     Past Medical History:   Diagnosis Date    Adenocarcinoma of sigmoid colon 03/10/2025    Colon cancer screening 10/26/2024    Cologuard positive    GERD (gastroesophageal reflux disease)     Hypertension 2018    no longer taking BP meds since changing his diet/ weight loss    Left kidney mass 2025     Past Surgical History:   Procedure Laterality Date    BICEPS TENDON REPAIR      COLONOSCOPY N/A 03/10/2025    Procedure: COLONOSCOPY with POLYPECTOMY, SCLEROTHERAPY with INK TATOO, & BIOPSY;  Surgeon: Arturo Peoples MD;  [facility] ENDOSCOPY;  Service: General;  Laterality: N/A;  POST: DIVERTICULOSIS, COLON POLYPS, COLON MASS      General Information       Row Name 25 1531          General Information    Patient Profile Reviewed yes  -AH     Prior Level of Function independent:;all household mobility;community mobility;home management;driving;ADL's  -     Existing Precautions/Restrictions other (see comments)  CARLINE drain  -     Barriers to Rehab none identified  -       Row Name 25 1531          Living Environment    Current Living Arrangements home  -     People in Home spouse;child(carissa), adult  -       Row Name 25 1531          Home Main Entrance    Number of Stairs, Main Entrance three  -       Row Name 25 1531          Stairs Within Home, Primary    Number of Stairs, Within Home, Primary one  -     Stair Railings, Within Home, Primary railings safe and in  good condition  -AH       Row Name 04/17/25 1531          Cognition    Orientation Status (Cognition) oriented x 4  -               User Key  (r) = Recorded By, (t) = Taken By, (c) = Cosigned By      Initials Name Provider Type     Areli Schwarz, PT Physical Therapist                   Mobility       [facility] Name 04/17/25 1533          Bed Mobility    Bed Mobility bed mobility (all) activities  -     All Activities, [place] (Bed Mobility) independent  -AH       Row Name 04/17/25 1533          Sit-Stand Transfer    Sit-Stand [place] (Transfers) supervision  -AH       Row Name 04/17/25 1533          Gait/Stairs (Locomotion)    [place] Level (Gait) supervision  -     Patient was able to Ambulate yes  -     Distance in Feet (Gait) 250  -               User Key  (r) = Recorded By, (t) = Taken By, (c) = Cosigned By      Initials Name Provider Type     Areli Schwarz, PT Physical Therapist                   Obj/Interventions       Row Name 04/17/25 1533          Range of Motion Comprehensive    General Range of Motion no range of motion deficits identified  -AH       Row Name 04/17/25 1533          Strength Comprehensive (MMT)    General Manual Muscle Testing (MMT) Assessment no strength deficits identified  -AH       Row Name 04/17/25 1533          Balance    Balance Assessment sitting static balance;sitting dynamic balance;standing static balance;standing dynamic balance  -     Static Sitting Balance independent  -     Dynamic Sitting Balance independent  -     Position, Sitting Balance unsupported;sitting edge of bed  -     Static Standing Balance supervision  -     Dynamic Standing Balance supervision  -     Position/Device Used, Standing Balance unsupported  -AH       Row Name 04/17/25 1533          Sensory Assessment (Somatosensory)    Sensory Assessment (Somatosensory) sensation intact  -               User Key  (r) = Recorded By, (t) = Taken By, (c) =  Cosigned By      Initials Name Provider Type     Areli Schwarz, PT Physical Therapist                   Goals/Plan    No documentation.                  Clinical Impression       Row Name 04/17/25 1534          Pain    Pretreatment Pain Rating 5/10  -AH     Posttreatment Pain Rating 5/10  -     Pain Location abdomen  -       Row Name 04/17/25 1534          Plan of Care Review    Plan of Care Reviewed With patient  -     Outcome Evaluation Vance Sheets Jr is a 55 yo M who presented to [facility] on 4/16 for 7 cm left renal mass concerning for renal cell carcinoma. Pt is POD #1 Left robotic-assisted laparoscopic radical nephrectomy and colon resection. Pt is A&Ox4 and reports he lives with his spouse and 2 adult children in a H with 1 [facility] and is typically IND with ADLs/IADLs and mobility w/o AD. Today pt is SUP for transfers and ambulation ~250 ft with PT management of IV pole. Pt appears to be at functional baseline and is safe to return home without further skilled PT upon d/c. No acute IP PT warranted at this time, will sign off.  -       Row Name 04/17/25 1534          Therapy Assessment/Plan (PT)    Criteria for Skilled Interventions Met (PT)   -     Therapy Frequency (PT) evaluation only  -[facility] Name 04/17/25 1534          Vital Signs    O2 Delivery Pre Treatment room air  -     O2 Delivery Intra Treatment room air  -     O2 Delivery Post Treatment room air  -AH     Pre Patient Position Supine  -     Intra Patient Position Standing  -     Post Patient Position Supine  -       Row Name 04/17/25 1534          Positioning and Restraints    Pre-Treatment Position in bed  -     Post Treatment Position bed  -     In Bed notified nsg;fowlers;call light within reach;encouraged to call for assist;with family/caregiver  -               User Key  (r) = Recorded By, (t) = Taken By, (c) = Cosigned By      Initials Name Provider Type    Areli Ng, FARRUKH Physical  Therapist                   Outcome Measures       Row Name 04/17/25 0837          How much help from another person do you currently need...    Turning from your back to your side while in flat bed without using bedrails? 4  -EY     Moving from lying on back to sitting on the side of a flat bed without bedrails? 4  -EY     Moving to and from a bed to a chair (including a wheelchair)? 4  -EY     Standing up from a chair using your arms (e.g., wheelchair, bedside chair)? 4  -EY     Climbing 3-5 steps with a railing? 4  -EY     To walk in hospital room? 4  -EY     AM-PAC 6 Clicks Score (PT) 24  -EY               User Key  (r) = Recorded By, (t) = Taken By, (c) = Cosigned By      Initials Name Provider Type     Nadia Jensen, RN Registered Nurse                                 Physical Therapy Education       Title: PT OT SLP Therapies (Done)       Topic: Physical Therapy (Done)       Point: Mobility training (Done)       Learning Progress Summary            Patient Acceptance, E, VU by  at 4/17/2025 1537                      Point: Body mechanics (Done)       Learning Progress Summary            Patient Acceptance, E, VU by  at 4/17/2025 1537                      Point: Precautions (Done)       Learning Progress Summary            Patient Acceptance, E, VU by  at 4/17/2025 1537                                      User Key       Initials Effective Dates Name Provider Type Discipline     08/12/24 -  Areli Schwarz, PT Physical Therapist PT                  PT Recommendation and Plan     Outcome Evaluation: Vance Sheets Jr is a 55 yo M who presented to [facility] on 4/16 for 7 cm left renal mass concerning for renal cell carcinoma. Pt is POD #1 Left robotic-assisted laparoscopic radical nephrectomy and colon resection. Pt is A&Ox4 and reports he lives with his spouse and 2 adult children in a [facility] with 1 [place] and is typically IND with ADLs/IADLs and mobility w/o AD. Today pt is SUP for transfers and  ambulation ~250 ft with PT management of IV pole. Pt appears to be at functional baseline and is safe to return home without further skilled PT upon d/c. No acute IP PT warranted at this time, will sign off.     Time Calculation:   PT Evaluation Complexity  History, PT Evaluation Complexity: 3 or more personal factors and/or comorbidities  Examination of Body Systems (PT Eval Complexity): 1-2 elements  Clinical Presentation (PT Evaluation Complexity): stable  Clinical Decision Making (PT Evaluation Complexity): moderate complexity  Overall Complexity (PT Evaluation Complexity): low complexity     PT Charges       Row Name 04/17/25 1538             Time Calculation    Start Time 1431  -      Stop Time 1449  -      Time Calculation (min) 18 min  -      PT Received On 04/17/25  -                User Key  (r) = Recorded By, (t) = Taken By, (c) = Cosigned By      Initials Name Provider Type     Areli Schwarz, PT Physical Therapist                  Therapy Charges for Today       Code Description Service Date Service Provider Modifiers Qty    [id number] HC PT EVAL LOW COMPLEXITY 4 4/17/2025 Areli Schwarz, PT GP 1            PT G-Codes  AM-PAC 6 Clicks Score (PT): 24  PT Discharge Summary  Anticipated Discharge Disposition (PT): home with assist    Areli Schwarz, PT  4/17/2025

## 2025-04-18 ENCOUNTER — READMISSION MANAGEMENT (OUTPATIENT)
Dept: CALL CENTER | Facility: HOSPITAL | Age: 55
End: 2025-04-18
Payer: COMMERCIAL

## 2025-04-18 VITALS
OXYGEN SATURATION: 94 % | HEIGHT: 70 IN | HEART RATE: 105 BPM | SYSTOLIC BLOOD PRESSURE: 120 MMHG | DIASTOLIC BLOOD PRESSURE: 80 MMHG | WEIGHT: 247.4 LBS | RESPIRATION RATE: 18 BRPM | BODY MASS INDEX: 35.42 KG/M2 | TEMPERATURE: 98.1 F

## 2025-04-18 LAB
LAB AP CASE REPORT: NORMAL
LAB AP SYNOPTIC CHECKLIST: NORMAL
PATH REPORT.FINAL DX SPEC: NORMAL
PATH REPORT.GROSS SPEC: NORMAL

## 2025-04-18 PROCEDURE — 99024 POSTOP FOLLOW-UP VISIT: CPT | Performed by: STUDENT IN AN ORGANIZED HEALTH CARE EDUCATION/TRAINING PROGRAM

## 2025-04-18 PROCEDURE — 25010000002 HEPARIN (PORCINE) PER 1000 UNITS: Performed by: STUDENT IN AN ORGANIZED HEALTH CARE EDUCATION/TRAINING PROGRAM

## 2025-04-18 RX ORDER — METHOCARBAMOL 750 MG/1
750 TABLET, FILM COATED ORAL 3 TIMES DAILY
Qty: 30 TABLET | Refills: 0 | Status: SHIPPED | OUTPATIENT
Start: 2025-04-18 | End: 2025-04-28

## 2025-04-18 RX ORDER — OXYCODONE HYDROCHLORIDE 5 MG/1
5 TABLET ORAL EVERY 8 HOURS PRN
Qty: 40 TABLET | Refills: 0 | Status: SHIPPED | OUTPATIENT
Start: 2025-04-18 | End: 2025-04-28

## 2025-04-18 RX ADMIN — ALVIMOPAN 12 MG: 12 CAPSULE ORAL at 09:38

## 2025-04-18 RX ADMIN — HEPARIN SODIUM 5000 UNITS: 5000 INJECTION INTRAVENOUS; SUBCUTANEOUS at 05:55

## 2025-04-18 RX ADMIN — PREGABALIN 25 MG: 25 CAPSULE ORAL at 05:54

## 2025-04-18 RX ADMIN — METHOCARBAMOL TABLETS 750 MG: 750 TABLET, COATED ORAL at 09:39

## 2025-04-18 RX ADMIN — OXYCODONE 7.5 MG: 5 TABLET ORAL at 06:07

## 2025-04-18 RX ADMIN — ACETAMINOPHEN 1000 MG: 500 TABLET, FILM COATED ORAL at 09:34

## 2025-04-18 NOTE — DISCHARGE SUMMARY
Date of Admission: 4/16/2025  Date of Discharge:  4/18/2025  Primary Care Physician: Leann Duke, RACHEL     Discharge Diagnosis:  Active Hospital Problems    Diagnosis  POA    **Cancer of sigmoid [C18.7]  Unknown      Resolved Hospital Problems   No resolved problems to display.       Presenting Problem/History of Present Illness:  Cancer of sigmoid [C18.7]     Hospital Course:  The patient is a 54 y.o. male who presented with  sigmoid cancer and left kidney mass     Exam Today:    Procedures Performed:  Procedure(s):  COLON RESECTION LOW ANTERIOR LAPAROSCOPIC WITH DAVINCI ROBOT  CYSTOSCOPY, ICG INJECTION INTO URETERS, CARLSON INSERTION  SIGMOIDOSCOPY FLEXIBLE  RADICAL NEPHRECTOMY LAPAROSCOPIC WITH DAVINCI ROBOT       Consults:   Consults       No orders found from 3/18/2025 to 4/17/2025.             Discharge Disposition:  Home or Self Care    Discharge Medications:     Discharge Medications        New Medications        Instructions Start Date   methocarbamol 750 MG tablet  Commonly known as: ROBAXIN   750 mg, Oral, 3 times daily      oxyCODONE 5 MG immediate release tablet  Commonly known as: Roxicodone   5 mg, Oral, Every 8 Hours PRN             Continue These Medications        Instructions Start Date   Chlorhexidine Gluconate 4 % solution   1 Application, Topical, 2 Times Daily, Shower With Hibiclens Solution Twice The Day Before Surgery      sodium-potassium-magnesium sulfates 17.5-3.13-1.6 GM/177ML solution oral solution  Commonly known as: SUPREP   PLEASE REPLACE WITH GOLYTELY 4000ML IF SUPREP IS NOT APPROVED BY INSURANCE 5 PM the day before your scheduled colonoscopy - Take your 1st dose of bowel prep 5 AM - Take your 2nd dose of bowel prep You may still have watery bowel movements after you finish drinking the solution.               Discharge Diet: regular     Activity at Discharge: as tolerated, avoid lifting over ten lbs     Follow-up Appointments:  Future Appointments   Date Time Provider  Department Center   4/28/2025 12:45 PM Arturo Peoples MD [facility]   5/2/2025 12:15 PM Krissy March MD [facility]   5/2/2025 12:15 PM LAB MD [facility] LAB NA  [facility]   9/5/2025  8:15 AM Leann Duke, APRN [facility]         Test Results Pending at Discharge:  Pending Results       None             Arturo Peoples MD  04/18/25  08:35 EDT    Time Spent on Discharge Activities: 10 min

## 2025-04-18 NOTE — PLAN OF CARE
Goal Outcome Evaluation:                                          Patient discharging home with spouse. Will follow up with dr miranda in one week. CARLINE drain remaining at dischare

## 2025-04-18 NOTE — PLAN OF CARE
Goal Outcome Evaluation:      Patient alert and oriented x 4. Able to make need known. Pain treated per medication on the MAR. Family at bedside. CARLINE drain remains in place. Personal items and call light in reach. Plan of care is ongoing.                                        4 = No assist / stand by assistance

## 2025-04-18 NOTE — OUTREACH NOTE
Prep Survey      Flowsheet Row Responses   [facility] patient discharged from? [facility]   Is LACE score < 7 ? No   Eligibility [facility]   Date of Admission 04/16/25   Date of Discharge 04/18/25   Discharge Disposition Home or Self Care   Discharge diagnosis Cancer of sigmoid   Does the patient have one of the following disease processes/diagnoses(primary or secondary)? Other   Does the patient have Home health ordered? No   Is there a DME ordered? No   Prep survey completed? Yes            RANULFO CORRALES - Registered Nurse

## 2025-04-18 NOTE — DISCHARGE INSTRUCTIONS
Post-Operative Discharge Instructions: Colon resection  [facility] - [facility]      Your successful surgery marks an important step toward your recovery. To ensure a smooth and campa recovery process, please follow these post-operative discharge instructions:    1. Wound Care:    -Keep the incision areas clean and dry.  -Report any signs of infection such as increased redness, swelling, or discharge.  - Its ok to take a shower.     2. Pain Management:    -Take prescribed pain medications as directed.  - Take tylenol and motrin alternatively every three hours. You can take prescribed narcotics only if you have breakthrough pain.   - Do not drive if you have taken narcotic pain meds.       3. Activity:    - Begin with light activities like short walks around the house.  - Gradually increase activity levels as tolerated, but avoid strenuous activities for the first few weeks.  - Avoid heavy lifting (more than 10 pounds) until cleared by your surgeon.    4. Diet:    - Continue with regular diet. If you start feeling bloated or nauseas, back down to clear liquid diet and progress to a full liquid diet as tolerated.Gradually reintroduce solid foods.  - Stay well-hydrated and avoid carbonated beverages initially.    5. Medications:    -Resume regular medications as instructed by your healthcare provider.    6. Follow-up Appointments:    - Schedule and attend all follow-up appointments with your surgeon and healthcare team.  - Discuss any concerns or questions you may have about your recovery.    7. Signs of Complications:    Seek immediate medical attention if you experience:  -Persistent fever  -Increased abdominal pain  -Worsening redness, swelling, or discharge at the incision site  -Persistent nausea or vomiting  -Difficulty breathing or chest pain    8. Rest and Self-Care:    -Allow yourself time to rest and recover.  -Prioritize self-care and listen to your body.  -Reach out to friends, family, or support groups for  assistance and emotional support.    Remember, these instructions are general guidelines, and your surgeon may provide specific recommendations tailored to your case. If you have any questions or concerns during your recovery, don't hesitate to contact your healthcare provider.    Wishing you a smooth and speedy recovery!  Arturo Peoples MD   Colon and Rectal Surgery   [facility], [address]  T: [phone]

## 2025-04-21 ENCOUNTER — TRANSITIONAL CARE MANAGEMENT TELEPHONE ENCOUNTER (OUTPATIENT)
Dept: CALL CENTER | Facility: HOSPITAL | Age: 55
End: 2025-04-21
Payer: COMMERCIAL

## 2025-04-21 NOTE — PAYOR COMM NOTE
"Discharge notification for case# [id number]     ==============    THANK YOU,    MICHELL Cooley, RN  Utilization Review  [facility]  Phone: [phone]  Fax: [phone]      NPI: [id number]  Tax ID: [id number]        Vance Amaya Jr. (54 y.o. Male)       Date of Birth   1970    Social Security Number       Address   [address] [place] [address] [place] [address]    Home Phone   [phone]    MRN   [id number]       Yazdanism   Alevism    Marital Status                               Admission Date   4/16/2025    Admission Type   Elective    Admitting Provider   Arturo Peoples MD    Attending Provider       Department, Room/Bed   [facility], 4128/1       Discharge Date   4/18/2025    Discharge Disposition   Home or Self Care    Discharge Destination                                 Attending Provider: (none)   Allergies: No Known Allergies    Isolation: None   Infection: None   Code Status: Prior    Ht: 177.8 cm (70\")   Wt: 112 kg (247 lb 6.4 oz)    Admission Cmt: None   Principal Problem: Cancer of sigmoid [C18.7]                   Active Insurance as of 4/16/2025       Primary Coverage       Payor Plan Insurance Group Employer/Plan Group    [employer] [facility] PPO [id number]       Payor Plan Address Payor Plan Phone Number Payor Plan Fax Number Effective Dates    [address] [phone]  2/1/2024 - None Entered    [place] [address]         Subscriber Name Subscriber Birth Date Member ID       VANCE AMAYA JR. 1970 [id number]                     Emergency Contacts        (Rel.) Home Phone Work Phone Mobile Phone    UNIQUE AMAYA (Spouse) [phone] -- [phone]                 Operative/Procedure Notes (all)        Leno Zaidi MD at 04/16/25 1259  Version 1 of 1         Urology Operative Note    4/16/2025    Vance Amaya JrEvita  54 y.o.  1970  male  [id number]      Surgeon(s) and Role:  " Leno Zaidi MD - Primary     Preoperative Diagnosis: 7cm left renal mass    Postoperative Diagnosis: Same    Complications: None    Procedures:    Left robotic-assisted laparoscopic radical nephrectomy    Indications   Vance Pearl Jr. is a 54 y.o. male who presented with left renal mass found on workup for colon mass.  Mass was 7 cm and central, discussed options and he elected for combination of surgery of colectomy with left radical nephrectomy robotic assisted possible open    During the informed consent process, the procedure was discussed in detail including the risks of bleeding, infection, and damage to surrounding structures.    Findings     Description of procedure:  The patient was properly identified in the preoperative holding area and taken to the operating room where general anesthesia was induced. Time out taken.   All pressure points carefully padded.  Dr. Peoples started the procedure and placed the ports in his preferred position.  Once the colon was mobilized I sat down at the robotic console and started the nephrectomy.    The lower pole was mobilized further by identifying the psoas tendon.  More medially the ureter was easily identified and encircled.  The ureter was divided.  The lower pole was further mobilized along the aorta to the level of the renal vein.  I then mobilized the Gerota's fascia off  incised the peritoneum along the upper pole.  I was then able to divide some fat above the artery and vein, and I was able to bluntly develop a space above the hilum large enough to allow passage of the vascular stapler.  The entire hilum was stapled using the vascular load.  Good hemostasis was observed.  I completed the nephrectomy by continuing to mobilize the upper pole, which was quite stuck, careful dissection was performed to ensure none of the mass was left behind.  Finally the lateral attachments were divided and the specimen was free.  It was placed in a laparoscopic bag. Estefany  was placed on the hilum.  The case was turned back to Dr. Peoples for completion of his anastomosis and extraction of both specimens    There were no apparent complications.    I was present and scrubbed for the entire procedure.     Specimens: Left kidney    Estimated Blood Loss: 100mL           Leno Zaidi MD  [facility]  [address], [address]  [place] [address]  [phone]             Electronically signed by Leno Zaidi MD at 04/16/25 6849       Arturo Peoples MD at 04/16/25 1258  Version 1 of 1         CRS Operative Note   Vance Pearl JrEvita    Age/Gender 54 y.o. male MRN [id number]   Location [facility] MAIN OR Attending Arturo Peoples MD   Date of Surgery: 4/16/2025  Pre-op Diagnosis: Cancer of sigmoid [C18.7]   Post-op Diagnosis: same  Procedure:   Robotic low anterior resection   Flexible sigmoidoscopy   Cystoscopy with ICG injection   ICG use for perfusion check of the anastomosis  Surgeon: Arturo Peoples MD   Assistants: Erika Sawyer CSFA; Codie De Luna, RACHEL; Jayden Fan, JANINA  was responsible for performing the following activities: Retraction, Suction, Irrigation, Suturing, Closing, and Placing Dressing and their skilled assistance was necessary for the success of this case.  Anesthesia: General and Local  IV Fluids: refer to anesthesia record  Estimated Blood Loss: 200 cc  Drains: none  Findings:     Distal rectal mass   Negative leak test   Good perfusion with ICG     Complications: No complications  Specimens: Sigmoid colon, left kidney  Grafts or implants: none      Indications  54 y.o. male who was found to have a distal sigmoid mass in which biopsy was consistent with invasive adenocarcinoma.  Staging workup with CT scan showed no gross metastatic disease, however, there was a large left renal mass which was consistent with renal cell carcinoma.  He is here for bladder resection and left nephrectomy    Description of Procedure  After appropriate consent  including risks, benefits and alternatives, patient was brought to the OR, and placed in lithotomy position. Anesthesia was administered.  All delvis prominences were padded, antibiotics were given, and scds were placed.   The patient was prepped and drapped in usual sterile fashion.    The rigid cystoscopy was performed.  Left ureteral orifice was identified.  A 6 German Pollick catheter was inserted and advanced without difficulty.  5 cc ICG was instilled.  The Pollick catheter and rigid cystoscopy was then removed.  A Karimi catheter was then inserted.    The abdomen was prepped and draped in the usual sterile fashion    A 6 cm pfannenstiel incision was made. Dissection was carried down to the level of anterior sheath with blunt and electrocautery dissection. Incision was made just on the anterior sheath, superior and inferior fascial flaps were raised being careful to preserve the rectus abdominis, and abdominal entry was obtained with a muscle-splitting technique.    A small wound protector and Ajrun cap were placed. A 12 mm port was placed here and the abdomen insufflated to 15 mm Hg.    The abdomen was explored laparoscopically.  There was static disease identified in the liver or the abdominal wall.      Local anesthetic was applied at the level of peritoneum, 10 cc for each port. A total of three 8mm and one 12mm robotic ports were placed under direct vision in a transverse line from the LUQ to the RLQ. The right-most port was 12 mm. An Airseal port was placed via the Arjun cap.     The patient was positioned in steep trendelenberg and right side down. The small bowel was moved out of the pelvis with laparoscopic bowel graspers. The Robot Xi was docked to the left of the patient.     2 tattoos were identified and sigmoid colon.  The distal tattoo marked just distal from the sigmoid mass.  Additional tattoo was noted in the proximal sigmoid which marked a benign polyp that was removed.    The peritoneum  along the right side of the rectosigmoid was incised with monopolar scissors. The left colon was mobilized off the retroperitoneum in a medial to lateral technique. The left ureter was identified and dissected away from the mesentery. The inferior mesenteric artery and vein were isolated and divided with the robotic vessel sealer at its origin. The dissection was carried laterally towards the abdominal wall.  The dissection continued from the lateral to medial approach, dissecting the white line of toldt and again, identifying the left ureter. Then the lateral peritoneal attachments to the rectum were then incised with cautery.     The presacral space was developed in the Wholey plane and this dissection was carried towards the pelvis.       A distal transection point was chosen at least 7 cm from the distal tattoo which was about 10 cm from the anal verge. The mesentery of the rectum was divided with vessel sealer to the colon wall. The rectum was divided with tow firing of 60 mm robotic green load stapler .    We continued my dissection proximally.  The left colon was completely mobilized off the retroperitoneum and of the kidney.  There was some omentum wrapping around the proximal left colon and the kidney.  This was mobilized off and excised.    Dr. Zaidi then took over and completed his left nephrectomy.  The left kidney was placed in the Endo Catch bag.  This was left in the right lower quadrant.      A proximal transection point proximal at least 7 cm away from the mass was chosen. The mesentery of the distal descending colon was divided with vessel sealer to the colon  wall. IcG was then injected to ensure adequate perfusion proximal to the level of our desired proximal transection point. IcG also ensured adequate perfusion to the rectal stump.   In preparation for intra-corporeal anastomosis, a colotomy was created in the specimen, large enough to pass the anvil. 4 cm proximal to the colotomy a small  colotomy was made. The avil was dropped into the abdominal cavity via the pfannenstiel incision. A empty clip applier was used to grasp and place the anvil into the colon. This was gently moved proximally and the anvil guided out of the colon.   Proximal transection of colon was completed with a 60 mm robotic green load stapler. A 28 mm EEA stapler was then passed per rectum and mated with the anvil. Stapler was fired and the presence of two complete donuts were confirmed.  Flexible sigmoidoscopy showed a colorectal anastomosis that was hemostatic. Air leak test was negative.  The abdomen was exsufflated and robot was undocked.  We then identified the stapled colon end and brought out the specimen via the pfannenstiel incision.  The left kidney with the Endo Catch bag was then removed.  The RLQ 12 mm port was closed with a 2-0 vicryl passed with a Todd-Oziel needle under laparoscopic guidance. The pfannenstiel incision was closed with #1 stratifix and irrigated. Skin was closed with skin stapler.      The rest of the local anesthetic was applied and the case concluded.    Counts: Instrument, sponge, and needle counts were reported to be correct prior to closure and at the conclusion of the case.  Disposition  The patient was taken to Recovery Room in good condition.      Arturo Peoples MD  Colon and Rectal Surgery   [facility]        Electronically signed by Arturo Peoples MD at 04/17/25 2711       Arturo Peoples MD at 04/16/25 3643  Version 1 of 1         COLON RESECTION LOW ANTERIOR LAPAROSCOPIC WITH DAVINCI ROBOT, CYSTOSCOPY  Progress Note    Vance Pearl   4/16/2025    Pre-op Diagnosis:   Cancer of sigmoid [C18.7]       Post-Op Diagnosis Codes:     * Cancer of sigmoid [C18.7]    Procedure(s):      Procedure(s):  COLON RESECTION LOW ANTERIOR LAPAROSCOPIC WITH DAVINCI ROBOT  CYSTOSCOPY, ICG INJECTION INTO URETERS, CARLSON INSERTION  SIGMOIDOSCOPY FLEXIBLE  RADICAL NEPHRECTOMY LAPAROSCOPIC WITH DAVINCI  ROBOT              Surgeon(s):  Leno Zaidi MD Kmetz, Daniel, MD Almaz, Biruk, MD Almaz, Biruk, MD Almaz, Biruk, MD Almaz, Biruk, MD    Anesthesia: General with Block    Staff:   Circulator: Jaden Caro, KEYANA; Pati Fung, KEYANA; Hugh Ervin, RN  Physician Assistant: Mary Baez PA-C  Scrub Person: Ramsey Lyons; Kiya Kenny  Assistant: Erika Sawyer CSFA; Codie De Luna, APRN; Jayden Fan CSA  Assistant: Erika Sawyer CSFA; Codie De Luna, APRN; Jayden Fan CSA    Estimated Blood Loss: 200 mL    Urine Voided: 400 mL    Specimens:                Specimens       ID Source Type Tests Collected By Collected At Frozen?    A Kidney, Left Tissue TISSUE PATHOLOGY EXAM   Arturo Peoples MD 4/16/25 1440     Description: LEFT RADICAL NEPHRECTOMY    B Large Intestine, Sigmoid Colon Tissue TISSUE PATHOLOGY EXAM   Arturo Peoples MD 4/16/25 1441     C Large Intestine, Sigmoid Colon Tissue TISSUE PATHOLOGY EXAM   Arturo Peoples MD 4/16/25 1826     Description: PROXIMAL DONUT    D Large Intestine, Sigmoid Colon Tissue TISSUE PATHOLOGY EXAM   Arturo Peoples MD 4/16/25 1828     Description: DISTAL DONUT              Drains:   Closed/Suction Drain 1 LUQ Bulb 19 Fr. (Active)   Dressing Status Clean;Dry;Intact 04/16/25 1929   Status To bulb suction 04/16/25 1929       Urethral Catheter Silicone 16 Fr. (Active)       Findings:     Distal rectal mass   Negative leak test   Good perfusion with ICG       Complications: none     Assistant: Erika Sawyer CSFA; Cdoie De Luna, RACHEL; Jayden Fan CSA  was responsible for performing the following activities: Retraction, Suction, Irrigation, Suturing, Closing, and Placing Dressing and their skilled assistance was necessary for the success of this case.    Arturo Peoples MD     Date: 4/16/2025  Time: 20:02 EDT          Electronically signed by Arturo Peoples MD at 04/16/25 2003          Discharge Summary        Arturo Peoples MD at 04/18/25 1315           Date of Admission: 4/16/2025  Date of Discharge:  4/18/2025  Primary Care Physician: Leann Duke, RACHEL     Discharge Diagnosis:  Active Hospital Problems    Diagnosis  POA    **Cancer of sigmoid [C18.7]  Unknown      Resolved Hospital Problems   No resolved problems to display.       Presenting Problem/History of Present Illness:  Cancer of sigmoid [C18.7]     Hospital Course:  The patient is a 54 y.o. male who presented with  sigmoid cancer and left kidney mass     Exam Today:    Procedures Performed:  Procedure(s):  COLON RESECTION LOW ANTERIOR LAPAROSCOPIC WITH DAVINCI ROBOT  CYSTOSCOPY, ICG INJECTION INTO URETERS, CARLSON INSERTION  SIGMOIDOSCOPY FLEXIBLE  RADICAL NEPHRECTOMY LAPAROSCOPIC WITH DAVINCI ROBOT       Consults:   Consults       No orders found from 3/18/2025 to 4/17/2025.             Discharge Disposition:  Home or Self Care    Discharge Medications:     Discharge Medications        New Medications        Instructions Start Date   methocarbamol 750 MG tablet  Commonly known as: ROBAXIN   750 mg, Oral, 3 times daily      oxyCODONE 5 MG immediate release tablet  Commonly known as: Roxicodone   5 mg, Oral, Every 8 Hours PRN             Continue These Medications        Instructions Start Date   Chlorhexidine Gluconate 4 % solution   1 Application, Topical, 2 Times Daily, Shower With Hibiclens Solution Twice The Day Before Surgery      sodium-potassium-magnesium sulfates 17.5-3.13-1.6 GM/177ML solution oral solution  Commonly known as: SUPREP   PLEASE REPLACE WITH GOLYTELY 4000ML IF SUPREP IS NOT APPROVED BY INSURANCE 5 PM the day before your scheduled colonoscopy - Take your 1st dose of bowel prep 5 AM - Take your 2nd dose of bowel prep You may still have watery bowel movements after you finish drinking the solution.               Discharge Diet: regular     Activity at Discharge: as tolerated, avoid lifting over ten lbs     Follow-up Appointments:  Future Appointments   Date Time Provider  Department Center   4/28/2025 12:45 PM Arturo Peoples MD [facility]   5/2/2025 12:15 PM Krissy March MD [facility]   5/2/2025 12:15 PM LAB MD [facility] LAB NA  [facility]   9/5/2025  8:15 AM Leann Duke, APRN [facility]         Test Results Pending at Discharge:  Pending Results       None             Arturo Peoples MD  04/18/25  08:35 EDT    Time Spent on Discharge Activities: 10 min            Electronically signed by Arturo Peoples MD at 04/18/25 0836       Discharge Order (From admission, onward)       Start     Ordered    04/18/25 0834  Discharge patient  Once        Expected Discharge Date: 04/18/25   Discharge Disposition: Home or Self Care   Physician of Record for Attribution - Please select from Treatment Team: ARTURO PEOPLES [[id number]]   Review needed by CMO to determine Physician of Record: No      Question Answer Comment   Physician of Record for Attribution - Please select from Treatment Team ARTURO PEOPLES    Review needed by CMO to determine Physician of Record No        04/18/25 0879

## 2025-04-21 NOTE — CASE MANAGEMENT/SOCIAL WORK
Case Management Discharge Note      Final Note: Routine home         Selected Continued Care - Discharged on 4/18/2025 Admission date: 4/16/2025 - Discharge disposition: Home or Self Care         Transportation Services  Private: Car    Final Discharge Disposition Code: 01 - home or self-care

## 2025-04-21 NOTE — OUTREACH NOTE
Call Center TCM Note      Flowsheet Row Responses   [facility] patient discharged from? [facility]   Does the patient have one of the following disease processes/diagnoses(primary or secondary)? Other   TCM attempt successful? Yes   Call start time 1558   Call end time 1559   Discharge diagnosis Cancer of sigmoid   Meds reviewed with patient/caregiver? Yes   Is the patient having any side effects they believe may be caused by any medication additions or changes? No   Does the patient have all medications ordered at discharge? Yes   Is the patient taking all medications as directed (includes completed medication regime)? Yes   Does the patient have an appointment with their PCP within 7-14 days of discharge? No   Nursing Interventions Patient declined scheduling/rescheduling appointment at this time, Routed TCM call to PCP office   Has home health visited the patient within 72 hours of discharge? N/A   Psychosocial issues? No   Did the patient receive a copy of their discharge instructions? Yes   Nursing interventions Reviewed instructions with patient   What is the patient's perception of their health status since discharge? Improving   Is the patient/caregiver able to teach back signs and symptoms related to disease process for when to call PCP? Yes   Is the patient/caregiver able to teach back signs and symptoms related to disease process for when to call 911? Yes   Is the patient/caregiver able to teach back the hierarchy of who to call/visit for symptoms/problems? PCP, Specialist, Home health nurse, Urgent Care, ED, 911 Yes   If the patient is a current smoker, are they able to teach back resources for cessation? Not a smoker   TCM call completed? Yes   Call end time 1559            Nayana CALHOUN - Registered Nurse    4/21/2025, 15:59 EDT

## 2025-04-25 ENCOUNTER — DOCUMENTATION (OUTPATIENT)
Age: 55
End: 2025-04-25
Payer: COMMERCIAL

## 2025-04-25 NOTE — TELEPHONE ENCOUNTER
Patients prescription was sent to the wrong pharmacy. It has been pended for Mary CANNON to sign off on.

## 2025-04-28 ENCOUNTER — READMISSION MANAGEMENT (OUTPATIENT)
Dept: CALL CENTER | Facility: HOSPITAL | Age: 55
End: 2025-04-28
Payer: COMMERCIAL

## 2025-04-28 ENCOUNTER — OFFICE VISIT (OUTPATIENT)
Dept: FAMILY MEDICINE CLINIC | Facility: CLINIC | Age: 55
End: 2025-04-28
Payer: COMMERCIAL

## 2025-04-28 ENCOUNTER — OFFICE VISIT (OUTPATIENT)
Age: 55
End: 2025-04-28
Payer: COMMERCIAL

## 2025-04-28 VITALS
HEIGHT: 70 IN | BODY MASS INDEX: 34.36 KG/M2 | HEART RATE: 100 BPM | OXYGEN SATURATION: 98 % | TEMPERATURE: 98.6 F | WEIGHT: 240 LBS | SYSTOLIC BLOOD PRESSURE: 142 MMHG | DIASTOLIC BLOOD PRESSURE: 82 MMHG

## 2025-04-28 VITALS
HEART RATE: 104 BPM | HEIGHT: 70 IN | RESPIRATION RATE: 16 BRPM | BODY MASS INDEX: 34.07 KG/M2 | DIASTOLIC BLOOD PRESSURE: 74 MMHG | OXYGEN SATURATION: 97 % | SYSTOLIC BLOOD PRESSURE: 106 MMHG | TEMPERATURE: 97.3 F | WEIGHT: 238 LBS

## 2025-04-28 DIAGNOSIS — I77.810 ASCENDING AORTA DILATATION: Primary | ICD-10-CM

## 2025-04-28 DIAGNOSIS — R06.83 SNORING: ICD-10-CM

## 2025-04-28 DIAGNOSIS — C18.7 CANCER OF SIGMOID: ICD-10-CM

## 2025-04-28 DIAGNOSIS — C64.2 RENAL CELL CARCINOMA OF LEFT KIDNEY: ICD-10-CM

## 2025-04-28 DIAGNOSIS — C18.7 CANCER OF SIGMOID: Primary | ICD-10-CM

## 2025-04-28 DIAGNOSIS — N28.89 RENAL MASS, LEFT: ICD-10-CM

## 2025-04-28 PROCEDURE — 99024 POSTOP FOLLOW-UP VISIT: CPT | Performed by: PHYSICIAN ASSISTANT

## 2025-04-28 NOTE — PROGRESS NOTES
"Chief Complaint  Chief Complaint   Patient presents with    referral     Referral for sleep study and vascular doctor         Subjective          Vance Pearl Jr. is a 54-year-old male who reports to the office today to discuss snoring and abdominal aneurysm.      Abdominal aorta dilation- Was incidentally found on CT scan.     Snoring- He recently was in the hospital. He reports after coming out of anesthesia it showed signs of sleep apnea. He reports it is more when he sleeps on his back versus his stomach, but he has had to sleep on his back. He does snore. He does have daily fatigue. He denies waking up with a headache.     Renal cell carcinoma/sigmoid colon- He recently had his left kidney taken out and part of his sigmoid colon was taken out.          Review of Systems   Constitutional:  Negative for chills and fever.   HENT:  Negative for congestion.    Respiratory:  Negative for shortness of breath.    Cardiovascular:  Negative for chest pain.   Gastrointestinal:  Positive for abdominal pain. Negative for nausea and vomiting.   Genitourinary:  Negative for difficulty urinating.   Musculoskeletal:  Negative for myalgias.   Skin: Negative.    Neurological:  Negative for dizziness, light-headedness and headache.        Objective   Vital Signs:   Vitals:    04/28/25 0948   BP: 106/74   Pulse: 104   Resp: 16   Temp: 97.3 °F (36.3 °C)   SpO2: 97%      Estimated body mass index is 34.15 kg/m² as calculated from the following:    Height as of this encounter: 177.8 cm (70\").    Weight as of this encounter: 108 kg (238 lb).                          Physical Exam  Vitals reviewed.   Constitutional:       Appearance: Normal appearance. He is obese.   HENT:      Head: Normocephalic and atraumatic.      Nose: Nose normal.      Mouth/Throat:      Mouth: Mucous membranes are moist.      Pharynx: Oropharynx is clear.   Eyes:      Extraocular Movements: Extraocular movements intact.      Conjunctiva/sclera: Conjunctivae " normal.   Cardiovascular:      Rate and Rhythm: Normal rate and regular rhythm.      Pulses: Normal pulses.      Heart sounds: Normal heart sounds.      Comments: S1, S2 audible  Pulmonary:      Effort: Pulmonary effort is normal.      Breath sounds: Normal breath sounds.      Comments: On room air   Abdominal:      General: Abdomen is flat. Bowel sounds are normal.      Palpations: Abdomen is soft.      Comments: J/p drain noted- serosanguinous drainage noted- located in upper left/middle quadrant  Multiple small incisions noted- staples in place   Musculoskeletal:         General: Normal range of motion.      Cervical back: Normal range of motion.   Skin:     General: Skin is warm and dry.   Neurological:      General: No focal deficit present.      Mental Status: He is alert and oriented to person, place, and time. Mental status is at baseline.   Psychiatric:         Mood and Affect: Mood normal.         Behavior: Behavior normal.         Thought Content: Thought content normal.         Judgment: Judgment normal.                Physical Exam   Result Review :             Procedures       Assessment and Plan     Diagnoses and all orders for this visit:    1. Ascending aorta dilatation (Primary)  Assessment & Plan:   Abdominal aorta dilation- Was incidentally found on CT scan.     Referral to Vascular surgery     Orders:  -     Ambulatory Referral to Vascular Surgery    2. Snoring  Assessment & Plan:  Snoring- He recently was in the hospital. He reports after coming out of anesthesia it showed signs of sleep apnea. He reports it is more when he sleeps on his back versus his stomach, but he has had to sleep on his back. He does snore. He does have daily fatigue. He denies waking up with a headache.     Home sleep study ordered     Orders:  -     Home Sleep Study; Future    3. Renal cell carcinoma of left kidney  Assessment & Plan:  Renal cell carcinoma/sigmoid colon- He recently had his left kidney taken out and  part of his sigmoid colon was taken out. He sees Dr. Zaidi.      4. Cancer of sigmoid  Assessment & Plan:  Renal cell carcinoma/sigmoid colon- He recently had his left kidney taken out and part of his sigmoid colon was taken out. He sees Dr. Peoples                Follow Up   Return for Next scheduled follow up.   Patient was given instructions and counseling regarding her condition or for health maintenance advice. Please see specific information pulled into the AVS if appropriate.

## 2025-04-28 NOTE — ASSESSMENT & PLAN NOTE
Snoring- He recently was in the hospital. He reports after coming out of anesthesia it showed signs of sleep apnea. He reports it is more when he sleeps on his back versus his stomach, but he has had to sleep on his back. He does snore. He does have daily fatigue. He denies waking up with a headache.     Home sleep study ordered

## 2025-04-28 NOTE — OUTREACH NOTE
Medical Week 2 Survey      Flowsheet Row Responses   [facility] patient discharged from? [facility]   Does the patient have one of the following disease processes/diagnoses(primary or secondary)? Other   Week 2 attempt successful? No   Unsuccessful attempts Attempt 1   oke Luciano Morris Registered Nurse

## 2025-04-28 NOTE — ASSESSMENT & PLAN NOTE
Renal cell carcinoma/sigmoid colon- He recently had his left kidney taken out and part of his sigmoid colon was taken out. He sees Dr. Peoples

## 2025-04-28 NOTE — ASSESSMENT & PLAN NOTE
Renal cell carcinoma/sigmoid colon- He recently had his left kidney taken out and part of his sigmoid colon was taken out. He sees Dr. Zaidi.

## 2025-04-30 DIAGNOSIS — I77.810 ASCENDING AORTA DILATATION: Primary | ICD-10-CM

## 2025-04-30 NOTE — PROGRESS NOTES
Enter Query Response Below      Query Response: Yes. Patient has a sigmoid cancer and a renal mass which was consistent with final pathology results.              If applicable, please update the problem list.

## 2025-04-30 NOTE — PROGRESS NOTES
Colorectal Surgery Followup Note    ID:  Vance Pearl Jr.;   : 1970  DATE OF VISIT: 2025    Chief Complaint  Post-op (PO Colon resection )       Subjective    Patient is status post robotic low anterior resection and radical nephrectomy performed on 2025 . Patient is doing well. No pain, discharge, fevers, or chills reported. Appetite is good.       Exam  General:  No acute distress  Head: Normocephalic, atraumatic  Neuro: Alert and oriented    Abdomen:  Soft, non-tender, non-distended, no hernias, no hepatomegaly, no splenomegaly. No abnormal, audible bowel sounds.Incisions well-healed without any surrounding erythema or drainage.      Assessment  - Sigmoid Cancer  - Left renal mass concerning for renal cell carcinoma    Plan / Recommendations  -Vance has done well postop.  He is recovering well without any postoperative concerns  -Appointment with oncology scheduled for 25  -Skin staples and CARLINE drain were removed today in the office  -No dietary restrictions  -Continue with limiting lifting over 10 pounds  -Continue with DVT prophylaxis for 30 days  -Continue with pain meds as needed  -Okay to drive as long as he is not taking narcotic pain medications  -Follow up in 4 weeks     Mary Baez PA-C  Colon and Rectal Surgery   [facility]

## 2025-05-02 ENCOUNTER — LAB (OUTPATIENT)
Dept: LAB | Facility: HOSPITAL | Age: 55
End: 2025-05-02
Payer: COMMERCIAL

## 2025-05-02 ENCOUNTER — OFFICE VISIT (OUTPATIENT)
Dept: ONCOLOGY | Facility: CLINIC | Age: 55
End: 2025-05-02
Payer: COMMERCIAL

## 2025-05-02 VITALS
HEIGHT: 70 IN | SYSTOLIC BLOOD PRESSURE: 113 MMHG | OXYGEN SATURATION: 98 % | DIASTOLIC BLOOD PRESSURE: 83 MMHG | HEART RATE: 88 BPM | WEIGHT: 239 LBS | TEMPERATURE: 99 F | BODY MASS INDEX: 34.22 KG/M2 | RESPIRATION RATE: 18 BRPM

## 2025-05-02 DIAGNOSIS — C18.7 CANCER OF SIGMOID: ICD-10-CM

## 2025-05-02 DIAGNOSIS — C64.2 CLEAR CELL RENAL CELL CARCINOMA, LEFT: ICD-10-CM

## 2025-05-02 DIAGNOSIS — C18.7 CANCER OF SIGMOID: Primary | ICD-10-CM

## 2025-05-02 LAB
BASOPHILS # BLD AUTO: 0.03 10*3/MM3 (ref 0–0.2)
BASOPHILS NFR BLD AUTO: 0.3 % (ref 0–1.5)
DEPRECATED RDW RBC AUTO: 42.1 FL (ref 37–54)
EOSINOPHIL # BLD AUTO: 0.22 10*3/MM3 (ref 0–0.4)
EOSINOPHIL NFR BLD AUTO: 2.2 % (ref 0.3–6.2)
ERYTHROCYTE [DISTWIDTH] IN BLOOD BY AUTOMATED COUNT: 13.9 % (ref 12.3–15.4)
HCT VFR BLD AUTO: 39.3 % (ref 37.5–51)
HGB BLD-MCNC: 12.3 G/DL (ref 13–17.7)
LYMPHOCYTES # BLD AUTO: 2.03 10*3/MM3 (ref 0.7–3.1)
LYMPHOCYTES NFR BLD AUTO: 20.3 % (ref 19.6–45.3)
MCH RBC QN AUTO: 26.3 PG (ref 26.6–33)
MCHC RBC AUTO-ENTMCNC: 31.3 G/DL (ref 31.5–35.7)
MCV RBC AUTO: 84 FL (ref 79–97)
MONOCYTES # BLD AUTO: 0.88 10*3/MM3 (ref 0.1–0.9)
MONOCYTES NFR BLD AUTO: 8.8 % (ref 5–12)
NEUTROPHILS NFR BLD AUTO: 6.82 10*3/MM3 (ref 1.7–7)
NEUTROPHILS NFR BLD AUTO: 68.4 % (ref 42.7–76)
PLATELET # BLD AUTO: 434 10*3/MM3 (ref 140–450)
PMV BLD AUTO: 10.3 FL (ref 6–12)
RBC # BLD AUTO: 4.68 10*6/MM3 (ref 4.14–5.8)
WBC NRBC COR # BLD AUTO: 9.98 10*3/MM3 (ref 3.4–10.8)

## 2025-05-02 PROCEDURE — 36415 COLL VENOUS BLD VENIPUNCTURE: CPT

## 2025-05-02 PROCEDURE — 85025 COMPLETE CBC W/AUTO DIFF WBC: CPT

## 2025-05-02 NOTE — PATIENT INSTRUCTIONS
Keytruda - Pembrolizumab Injection  What is this medication?  PEMBROLIZUMAB (PEM broe DEMETRIUS ue mab) treats some types of cancer. It works by helping your immune system slow or stop the spread of cancer cells. It is a monoclonal antibody.    This medicine may be used for other purposes; ask your health care provider or pharmacist if you have questions.    COMMON BRAND NAME(S): Keytruda    What should I tell my care team before I take this medication?  They need to know if you have any of these conditions:    Allogeneic stem cell transplant (uses someone else's stem cells)  Autoimmune diseases, such as Crohn disease, ulcerative colitis, lupus  History of chest radiation  Nervous system problems, such as Guillain-[place] syndrome, myasthenia gravis  Organ transplant  An unusual or allergic reaction to pembrolizumab, other medications, foods, dyes, or preservatives  Pregnant or trying to get pregnant  Breast-feeding  How should I use this medication?  This medication is injected into a vein. It is given by your care team in a hospital or clinic setting.    A special MedGuide will be given to you before each treatment. Be sure to read this information carefully each time.    Talk to your care team about the use of this medication in children. While it may be prescribed for children as young as 6 months for selected conditions, precautions do apply.    Overdosage: If you think you have taken too much of this medicine contact a poison control center or emergency room at once.    NOTE: This medicine is only for you. Do not share this medicine with others.    What if I miss a dose?  Keep appointments for follow-up doses. It is important not to miss your dose. Call your care team if you are unable to keep an appointment.    What may interact with this medication?  Interactions have not been studied.    This list may not describe all possible interactions. Give your health care provider a list of all the medicines, herbs,  non-prescription drugs, or dietary supplements you use. Also tell them if you smoke, drink alcohol, or use illegal drugs. Some items may interact with your medicine.    What should I watch for while using this medication?  Your condition will be monitored carefully while you are receiving this medication. You may need blood work while taking this medication.    This medication may cause serious skin reactions. They can happen weeks to months after starting the medication. Contact your care team right away if you notice fevers or flu-like symptoms with a rash. The rash may be red or purple and then turn into blisters or peeling of the skin. You may also notice a red rash with swelling of the face, lips, or lymph nodes in your neck or under your arms.    Tell your care team right away if you have any change in your eyesight.    Talk to your care team if you may be pregnant. Serious birth defects can occur if you take this medication during pregnancy and for 4 months after the last dose. You will need a negative pregnancy test before starting this medication. Contraception is recommended while taking this medication and for 4 months after the last dose. Your care team can help you find the option that works for you.    Do not breastfeed while taking this medication and for 4 months after the last dose.    What side effects may I notice from receiving this medication?  Side effects that you should report to your care team as soon as possible:    Allergic reactions--skin rash, itching, hives, swelling of the face, lips, tongue, or throat  Dry cough, shortness of breath or trouble breathing  Eye pain, redness, irritation, or discharge with blurry or decreased vision  Heart muscle inflammation--unusual weakness or fatigue, shortness of breath, chest pain, fast or irregular heartbeat, dizziness, swelling of the ankles, feet, or hands  Hormone gland problems--headache, sensitivity to light, unusual weakness or fatigue,  dizziness, fast or irregular heartbeat, increased sensitivity to cold or heat, excessive sweating, constipation, hair loss, increased thirst or amount of urine, tremors or shaking, irritability  Infusion reactions--chest pain, shortness of breath or trouble breathing, feeling faint or lightheaded  Kidney injury (glomerulonephritis)--decrease in the amount of urine, red or dark brown urine, foamy or bubbly urine, swelling of the ankles, hands, or feet  Liver injury--right upper belly pain, loss of appetite, nausea, light-colored stool, dark yellow or brown urine, yellowing skin or eyes, unusual weakness or fatigue  Pain, tingling, or numbness in the hands or feet, muscle weakness, change in vision, confusion or trouble speaking, loss of balance or coordination, trouble walking, seizures  Rash, fever, and swollen lymph nodes  Redness, blistering, peeling, or loosening of the skin, including inside the mouth  Sudden or severe stomach pain, bloody diarrhea, fever, nausea, vomiting  Side effects that usually do not require medical attention (report to your care team if they continue or are bothersome):    Bone, joint, or muscle pain  Diarrhea  Fatigue  Loss of appetite  Nausea  Skin rash  This list may not describe all possible side effects. Call your doctor for medical advice about side effects. You may report side effects to FDA at 3-823-XJV-5384.    Where should I keep my medication?  This medication is given in a hospital or clinic. It will not be stored at home.    NOTE: This sheet is a summary. It may not cover all possible information. If you have questions about this medicine, talk to your doctor, pharmacist, or health care provider.    © 2025 [employer]/Gold Standard (2023-05-02 00:00:00)    Additional Information From [employer] About Keytruda  Self-Care Tips:  Drink at least two to three quarts of fluid every 24 hours, unless you are instructed otherwise.  If you should experience nausea, take anti-nausea  medications as prescribed by your doctor, and eat small frequent meals. Sucking on lozenges and chewing gum may also help.  Avoid sun exposure. Wear SPF 30 (or higher) sun block and protective clothing.  In general, drinking alcoholic beverages should be kept to a minimum or avoided completely. You should discuss this with your doctor.  Use an electric razor to minimize bleeding  Get plenty of rest.  Maintain good nutrition.  Wash your hands often.  You may be at risk for infection, report fever or any other signs of infection immediately to your healthcare provider. Try to avoid crowds or people with colds, and report fever or any other signs of infection immediately to your healthcare provider.  Discuss with your health care provider before taking any other medications including over the counter and herbal preparations.  To help prevent mouth sores use a soft toothbrush, and rinse three times a day with ½ to 1 teaspoon of baking soda and/or ½ to 1 teaspoon of salt mixed with 8 ounces of water.  If you experience symptoms or side effects, be sure to discuss them with your health care team. They can prescribe medications and/or offer other suggestions that are effective in managing such problems.    When to contact your doctor or health care provider:  Contact your health care provider immediately, day or night, if you should experience any of the following symptoms:    Fever of 100.4° F (38° or higher, chills)  Signs of reaction to the drug (wheezing, chest tightness, itching, bad cough, swelling of the face, lips, tongue or throat)  New or worsening cough, chest pain, or shortness of breath  Diarrhea or severe abdominal pain, especially right side  Blood in your stools or dark stools  Skin or the whites of your eyes turn yellow  Persistent or unusual headache, extreme weakness, dizziness or fainting, or vision changes  The following symptoms require medical attention, but are not an emergency. Contact your doctor  or health care provider within 24 hours of noticing any of the following:    Unable to eat or drink for 24 hours or have signs of dehydration: tiredness, thirst, dry mouth, dark and decrease amount of urine, or dizziness  Urine turns dark or brown (tea color)  Decreased appetite  Skin rash with or without itching  Sores in the mouth  Skin blisters and/or peels  Numbness or tingling in hands or feet  Bleed or bruise more easily than normal  Nausea (interferes with ability to eat and unrelieved with prescribed medication)  Vomiting (vomiting more than 4-5 times in a 24 hour period)  Always inform your health care provider if you experience any unusual symptoms.

## 2025-05-02 NOTE — PROGRESS NOTES
Hematology/Oncology Outpatient Follow Up    PATIENT NAME:Vance Pearl Jr.  :1970  MRN: [id number]  PRIMARY CARE PHYSICIAN: Leann Duke APRN  REFERRING PHYSICIAN: No ref. provider found    Chief Complaint   Patient presents with    Follow-up     Invasive moderately differentiated sigmoid        HISTORY OF PRESENT ILLNESS:   54-year-old male who had a routine screening colonoscopy was found to have sigmoidal mass on 3/10/2025.  Review of the colonoscopy suggest I had a 3 cm polyp in the rectum, in mass the rest of the polyps were tubular adenoma and inflammatory polyp.There is partially obstructing infiltrative highly suspicious malignant mass.  The mass extended for over 4 cm.  Multiple biopsies with taken.  This mass was noted to be at 20 cm from anal verge based on proctoscopy findings.  Close additional polyp measuring up to 3 cm which was removed, transverse colon also had a 1 cm pedunculated polyp which was also removed     Pathology revealed invasive poorly differentiated adenocarcinoma  MMR testing showed intact MMR     Patient subsequently had a CEA done which was 2.23     CT scan of the chest, abdomen and pelvis for restaging basically showed a mild aneurysmal dilatation of the ascending aorta measuring 4.1 cm no evidence of metastatic disease in the chest.  The abdomen there was no evidence of liver lesions.  He has gallstones.  At the upper pole of the left kidney there is a mass measuring 7.3 cm x 5.8 cm worrisome for renal cell carcinoma.  There was no definite extension into the left main renal vein.  No suspicious mass on the right kidney .        Patient has been referred for further evaluation and management of the above findings.           Patient is   He has 2 children  Does not smoke  He does not drink alcohol  He is a  of a [employer]     Family history significant for daughter with brain tumor at the age of 90s oligodendroglioma  Sister had  meningioma at the age of 54  His mother had precancerous polyps maternal grandmother also has precancerous colonic polyps  2 maternal aunts with lung cancer     He is accompanied today by his spouse for this appointment.    4/16/2025 patient underwent left nephrectomy, pathology revealed clear-cell renal cell carcinoma measuring 6.5 cm completely excised.  It was grade 3 tumor extended into the renal sinus there was no sarcomatoid or rhabdoid features identified all margins are negative for invasive cancer.  Pathology stage is pT3a pNX M0.  Patient underwent sigmoidectomy final pathology revealed a 5.3 cm mass grade 2.  Tumor invades through muscularis propria into the pericolonic or perirectal tissue no evidence of lymphovascular invasion, perineural invasion no known treatment effect.  All margins are negative for invasive cancer distance to the closest margin was 7 cm all regional lymph nodes negative for malignancy total of 14 nodes were removed pathology stage is pT3 pN0 M0  Past Medical History:   Diagnosis Date    Adenocarcinoma of sigmoid colon 03/10/2025    Colon cancer 4/2025    Colon cancer screening 10/26/2024    Cologuard positive    GERD (gastroesophageal reflux disease)     Hypertension 2018    no longer taking BP meds since changing his diet/ weight loss    Left kidney mass 03/13/2025       Past Surgical History:   Procedure Laterality Date    BICEPS TENDON REPAIR      COLON RESECTION N/A 04/16/2025    Procedure: COLON RESECTION LOW ANTERIOR LAPAROSCOPIC WITH DAVINCI ROBOT;  Surgeon: Arturo Peoples MD;  Location: [facility] MAIN OR;  Service: Robotics - DaVinci;  Laterality: N/A;    COLONOSCOPY N/A 03/10/2025    Procedure: COLONOSCOPY with POLYPECTOMY, SCLEROTHERAPY with INK TATOO, & BIOPSY;  Surgeon: Arturo Peoples MD;  Location: [facility] ENDOSCOPY;  Service: General;  Laterality: N/A;  POST: DIVERTICULOSIS, COLON POLYPS, COLON MASS    CYSTOSCOPY N/A 04/16/2025    Procedure: CYSTOSCOPY, ICG INJECTION INTO  URETERS, CARLSON INSERTION;  Surgeon: Arturo Peoples MD;  Location: [facility] MAIN OR;  Service: Robotics - DaVinci;  Laterality: N/A;    LYMPH NODE BIOPSY  4/2025    Same time as colon and kidney removal surgery.    NEPHRECTOMY Left 04/16/2025    Procedure: RADICAL NEPHRECTOMY LAPAROSCOPIC WITH DAVINCI ROBOT;  Surgeon: Leno Zaidi MD;  Location: [facility] MAIN OR;  Service: Robotics - DaVinci;  Laterality: Left;    SIGMOIDOSCOPY N/A 04/16/2025    Procedure: SIGMOIDOSCOPY FLEXIBLE;  Surgeon: Arturo Peoples MD;  Location: [facility] MAIN OR;  Service: General;  Laterality: N/A;       No current outpatient medications on file.    No Known Allergies    Family History   Problem Relation Age of Onset    Basal cell carcinoma Mother 72        Rectal    No Known Problems Sister     Uterine cancer Maternal Grandmother 38    Liver cancer Maternal Grandmother 76    No Known Problems Half-Brother     No Known Problems Half-Sister     No Known Problems Half-Sister     Oligodendroglioma Daughter 9    Lung cancer Maternal Aunt 64    Lung cancer Maternal Aunt 59    Lymphoma Maternal Aunt 59    Lung cancer Paternal Aunt 66    Breast cancer Paternal Aunt 75    Lung cancer Paternal Aunt 62       Cancer-related family history includes Breast cancer (age of onset: 75) in his paternal aunt; Liver cancer (age of onset: 76) in his maternal grandmother; Lung cancer (age of onset: 59) in his maternal aunt; Lung cancer (age of onset: 62) in his paternal aunt; Lung cancer (age of onset: 64) in his maternal aunt; Lung cancer (age of onset: 66) in his paternal aunt; Lymphoma (age of onset: 59) in his maternal aunt; Oligodendroglioma (age of onset: 9) in his daughter; Uterine cancer (age of onset: 38) in his maternal grandmother.    Social History     Tobacco Use    Smoking status: Never     Passive exposure: Never    Smokeless tobacco: Never   Vaping Use    Vaping status: Never Used   Substance Use Topics    Alcohol use: Never    Drug use: Never       HPI,  "ROS and PFSH have been reviewed and confirmed on 5/2/2025.     SUBJECTIVE:      He is here for fu. Healing form his surgical procedure    REVIEW OF SYSTEMS:  Review of Systems   Constitutional:  Negative for chills, fatigue and fever.   HENT:  Negative for congestion, drooling, ear discharge, rhinorrhea, sinus pressure and tinnitus.    Eyes:  Negative for photophobia, pain and discharge.   Respiratory:  Negative for apnea, choking and stridor.    Cardiovascular:  Negative for palpitations.   Gastrointestinal:  Negative for abdominal distention, abdominal pain and anal bleeding.   Endocrine: Negative for polydipsia and polyphagia.   Genitourinary:  Negative for decreased urine volume, flank pain and genital sores.   Musculoskeletal:  Negative for gait problem, neck pain and neck stiffness.   Skin:  Negative for color change, rash and wound.   Neurological:  Negative for tremors, seizures, syncope, facial asymmetry and speech difficulty.   Hematological:  Negative for adenopathy.   Psychiatric/Behavioral:  Negative for agitation, confusion, hallucinations and self-injury. The patient is not hyperactive.        OBJECTIVE:    Vitals:    05/02/25 1231   BP: 113/83   Pulse: 88   Resp: 18   Temp: 99 °F (37.2 °C)   TempSrc: Temporal   SpO2: 98%   Weight: 108 kg (239 lb)   Height: 177.8 cm (70\")   PainSc: 0-No pain     Body mass index is 34.29 kg/m².    ECOG  (0) Fully active, able to carry on all predisease performance without restriction    Physical Exam  Vitals and nursing note reviewed.   Constitutional:       General: He is not in acute distress.     Appearance: He is not diaphoretic.   HENT:      Head: Normocephalic and atraumatic.   Eyes:      General: No scleral icterus.        Right eye: No discharge.         Left eye: No discharge.      Conjunctiva/sclera: Conjunctivae normal.   Neck:      Thyroid: No thyromegaly.   Cardiovascular:      Rate and Rhythm: Normal rate and regular rhythm.      Heart sounds: Normal " heart sounds.      No friction rub. No gallop.   Pulmonary:      Effort: Pulmonary effort is normal. No respiratory distress.      Breath sounds: No stridor. No wheezing.   Abdominal:      General: Bowel sounds are normal.      Palpations: Abdomen is soft. There is no mass.      Tenderness: There is no abdominal tenderness. There is no guarding or rebound.   Musculoskeletal:         General: No tenderness. Normal range of motion.      Cervical back: Normal range of motion and neck supple.   Lymphadenopathy:      Cervical: No cervical adenopathy.   Skin:     General: Skin is warm.      Findings: No erythema or rash.   Neurological:      Mental Status: He is alert and oriented to person, place, and time.      Motor: No abnormal muscle tone.   Psychiatric:         Behavior: Behavior normal.         RECENT LABS  WBC   Date Value Ref Range Status   06/19/2025 8.08 3.40 - 10.80 10*3/mm3 Final     RBC   Date Value Ref Range Status   06/19/2025 4.95 4.14 - 5.80 10*6/mm3 Final     Hemoglobin   Date Value Ref Range Status   06/19/2025 12.5 (L) 13.0 - 17.7 g/dL Final   05/26/2023 15.9 13.0 - 17.7 g/dL Final   10/11/2022 15.4 13.0 - 17.7 g/dL Final     Hematocrit   Date Value Ref Range Status   06/19/2025 40.6 37.5 - 51.0 % Final   05/26/2023 48.2 37.5 - 51.0 % Final     MCV   Date Value Ref Range Status   06/19/2025 82.0 79.0 - 97.0 fL Final     MCH   Date Value Ref Range Status   06/19/2025 25.3 (L) 26.6 - 33.0 pg Final     MCHC   Date Value Ref Range Status   06/19/2025 30.8 (L) 31.5 - 35.7 g/dL Final     RDW   Date Value Ref Range Status   06/19/2025 14.4 12.3 - 15.4 % Final     RDW-SD   Date Value Ref Range Status   06/19/2025 41.2 37.0 - 54.0 fl Final     MPV   Date Value Ref Range Status   06/19/2025 10.7 6.0 - 12.0 fL Final     Platelets   Date Value Ref Range Status   06/19/2025 285 140 - 450 10*3/mm3 Final     Neutrophil %   Date Value Ref Range Status   06/19/2025 64.6 42.7 - 76.0 % Final     Lymphocyte %   Date  Value Ref Range Status   06/19/2025 25.0 19.6 - 45.3 % Final     Monocyte %   Date Value Ref Range Status   06/19/2025 8.3 5.0 - 12.0 % Final     Eosinophil %   Date Value Ref Range Status   06/19/2025 1.6 0.3 - 6.2 % Final     Basophil %   Date Value Ref Range Status   06/19/2025 0.5 0.0 - 1.5 % Final     Neutrophils, Absolute   Date Value Ref Range Status   06/19/2025 5.22 1.70 - 7.00 10*3/mm3 Final     Lymphocytes, Absolute   Date Value Ref Range Status   06/19/2025 2.02 0.70 - 3.10 10*3/mm3 Final     Monocytes, Absolute   Date Value Ref Range Status   06/19/2025 0.67 0.10 - 0.90 10*3/mm3 Final     Eosinophils, Absolute   Date Value Ref Range Status   06/19/2025 0.13 0.00 - 0.40 10*3/mm3 Final     Basophils, Absolute   Date Value Ref Range Status   06/19/2025 0.04 0.00 - 0.20 10*3/mm3 Final       Lab Results   Component Value Date    GLUCOSE 116 (H) 06/19/2025    BUN 12.7 06/19/2025    CREATININE 1.76 (H) 06/19/2025    BCR 7.2 06/19/2025    K 4.3 06/19/2025    CO2 25.6 06/19/2025    CALCIUM 9.0 06/19/2025    ALBUMIN 4.1 06/19/2025    AST 19 06/19/2025    ALT 18 06/19/2025         Assessment & Plan     Cancer of sigmoid  - CBC & Differential    Clear cell renal cell carcinoma, left  - MRI Brain With & Without Contrast  - Comprehensive Metabolic Panel      ASSESSMENT:    Cancer of sigmoid  - CBC & Differential        Invasive moderately differentiated sigmoid: Adenocarcinoma.  MMR intact  Status post sigmoidectomy pathology stage is pT3 pN0 M0  Status post left nephrectomy clear-cell renal cell carcinoma pT3a pNX M0 grade 3.  For  Adjuvant Keytruda  Family history of multiple colonic polyps, cancers worrisome for hereditary cancer syndrome.  Patient will benefit from genetic testing  Ascending aortic aneurysm 4.1 cm: Will need to be referred to vascular after surgical management of his malignancy.  Patient was given referral  Asymptomatic cholelithiasis: Patient notified  2 to  3 mm left lower lobe nodule, right  calcified upper lobe granuloma  CKD creatinine 1.7-1.8     Discussion    I have reviewed the above diagnoses  He has clinical stage II sigmoid carcinoma with no high risk features.  MMR stable disease.  Discussed the indications for adjuvant chemotherapy for stage II colon cancer.  So far there is no added additional risk on the clinical pathologic evaluation.  He had adequate lymph node sampling all margins are negative and the tumor was grade 2 histology.  We discussed overall benefits of chemotherapy with clinical stage II disease which is not more than 5%.  At this point in time patient is not interested in pursuing adjuvant chemotherapy      Discussed his renal cell carcinoma.  This malignancy is Grade 3    I discussed the role of adjuvant Keytruda with renal cell carcinoma.  Reviewed the benefits and side effects in detail with patient and spouse who is present today      Diarrhea, fatigue, pruritus and rash.  Also included pulmonary toxicity, hepatotoxicity, nephrotoxicity as well as neurotoxicity. There is potential for endocrine and metabolic abnormalities including hyperglycemia, hypertriglyceridemia, hyponatremia, phosphorus abnormalities, hypothyroidism or hyperthyroidism.  There could also be pancytopenia, risk of infection and peripheral neuropathy.  Discussed lab monitoring that will be needed while on continuous immunotherapy and medicines to help with supportive care.         Plans:     Brain MRI to rule brain mets as soon as possible.   He has stage 3 kidney cancer,   schedule chemo education with Barb in 2 weeks with plans to begin Keytruda q 21 days that week for total one year. I will notify Guillermo.   Give him information on Keytruda.   Intent of treatment is for cure. cmp today   Discussed that he would benefit from genetic testing.  Patient to consider this after surgical resections  All questions answered  Will continue to follow                 I spent 40 total minutes, face-to-face, caring  for Vance today. 90% of this time involved counseling and/or coordination of care as documented within this note.       Electronically signed by Krissy March MD, 06/19/25, 6:07 PM EDT.

## 2025-05-13 NOTE — PROGRESS NOTES
TREATMENT  PREPARATION    Vance Pearl Jr.  [id number]  1970    Chief Complaint: Treatment preparation and needs assessment    History of present illness:  Vance Pearl Jr. is a 54 y.o. year old male who is here today for treatment preparation and needs assessment.  The patient has been diagnosed with   Encounter Diagnoses   Name Primary?   • Cancer of sigmoid Yes   • Clear cell renal cell carcinoma, left     and is scheduled to begin treatment with:     Oncology History:    Oncology/Hematology History   Renal cell carcinoma   3/19/2025 Initial Diagnosis    Renal cell carcinoma     5/2/2025 -  Chemotherapy    OP KIDNEY Pembrolizumab 200 mg         The current medication list and allergy list were reviewed and reconciled.     Past Medical History, Past Surgical History, Social History, Family History have been reviewed and are without significant changes except as mentioned.    Physical Exam:    Vitals:    05/14/25 1007   BP: 127/86   Pulse: 88   Temp: 98.2 °F (36.8 °C)   SpO2: 100%     Vitals:    05/14/25 1007   PainSc: 0-No pain        ECOG score: 0             Physical Exam  Vitals reviewed.   Constitutional:       Appearance: Normal appearance.   HENT:      Head: Normocephalic and atraumatic.      Right Ear: External ear normal.      Left Ear: External ear normal.      Nose: Nose normal.      Mouth/Throat:      Mouth: Mucous membranes are moist.   Eyes:      General: No scleral icterus.     Conjunctiva/sclera: Conjunctivae normal.   Cardiovascular:      Rate and Rhythm: Normal rate and regular rhythm.      Heart sounds: Normal heart sounds.   Pulmonary:      Effort: Pulmonary effort is normal.   Abdominal:      Palpations: Abdomen is soft.   Musculoskeletal:         General: Normal range of motion.      Cervical back: Normal range of motion and neck supple.   Skin:     General: Skin is warm and dry.   Neurological:      General: No focal deficit present.      Mental Status: He is alert and oriented to  person, place, and time.   Psychiatric:         Mood and Affect: Mood normal.         Behavior: Behavior normal.         Thought Content: Thought content normal.         Judgment: Judgment normal.       NEEDS ASSESSMENTS    Genetics  The patient's new diagnosis and family history have been reviewed for genetic counseling needs. The patient will be referred..     Psychosocial and Barriers to care  The patient has completed a PHQ-9 Depression Screening and the Distress Thermometer (DT) today.  PHQ-9 results show PHQ-2 Total Score: PHQ-2 Total Score: 0         The patient scored their distress today as Distress Level: 1 on a scale of 0-10 with 0 being no distress and 10 being extreme distress. Problems marked by the patient as being an issue for them within the last week include   .      Results were reviewed along with psychosocial resources offered by our cancer center.  Our Supportive Oncology team will be flagged for a score of 4 or above, and a same day call will be made for a score of 9 or 10.  A mental health referral is offered at that time. Patients who score less than 4 have been educated on our support services and can be referred to our  upon request.  The patient will be referred to our .       Nutrition  The patient has completed the malnutrition screening today. They scored     with a score of 0-1 meaning not at risk in a score of 2 or greater meaning at risk.  Patients with a score of 3 or higher will be referred to our oncology dietitian for support. Patients beginning at risk treatment regimens or who have dietary concerns will also be referred to our oncology dietitian. The patient will be referred.    Functional Assessment  Persons who are age 70 or greater will be screened for qualification of a comprehensive geriatric assessment by our survivorship nurse practitioner.  Older adults with cancer face unique challenges. These may include an increased risk of drug reactions,  "financial burdens, and caregiver stress. The patient scored   . Patients scoring 14 or lower will referred for an older adult functional assessment with the survivorship advanced practice registered nurse to ensure all needed support is provided as patients plan for their treatments. NOT APPLICABLE    Intravenous Access Assessment  The patient and I discussed planned intravenous chemo/biotherapy as well as other IV treatments that are often needed throughout the course of treatment. These may include, but are not limited to blood transfusions, antibiotics, and IV hydration. Discussed that depending on selected treatment and vein assessment, patient may require venous access device (VAD) which could include but not limited to a Mediport or PICC line. Risks and benefits of VADs reviewed. The patient will be treated via PIV.    Reproductive/Sexual Activity   People should avoid becoming pregnant and should not get a partner pregnant while undergoing chemo/biotherapy.  People of childbearing age should use effective contraception during active therapy. The best recommendation for all people is to use a barrier method for a minimum of 1 week after the last infusion of chemo/biotherapy to prevent your partner being exposed to byproducts from treatment medications in bodily fluids. Effective contraception should be discussed with your oncology team to make sure it is safe to take based on your diagnosis. Possible options include oral contraceptives, barrier methods. Chemo/biotherapy can change your ability to reproduce children in the future.  There are options for fertility preservation. NOT APPLICABLE    Advanced Care Planning  Advance Care Planning   The patient and I discussed advanced care planning, \"Conversations that Matter\".   This service is offered for development of advance directives with a certified ACP facilitator.  The patient does not have an up-to-date advanced directive. This document is not on file with " our office. The patient is not interested in an appointment with one of our facilitators to create or update their advanced directives.               Smoking cessation  Tobacco Use: Low Risk  (5/14/2025)    Patient History    • Smoking Tobacco Use: Never    • Smokeless Tobacco Use: Never    • Passive Exposure: Never       Patient and I discussed their tobacco use history. Referral will not be made for smoking cessation.      Palliative Care  When appropriate, the patient and I discussed the availability palliative care services and when appropriate Hospice care. Palliative care is not the same as Hospice care which was explained to the patient.The patient is not interested in additional information from our  on these services.    Survivorship   When appropriate, we discussed that we will refer the patient to survivorship clinic to discuss next steps following completion of planned treatment.  Reviewed this visit will include assessment of your physical, psychological, functional, and spiritual needs as a survivor and the need at attend this visit when scheduled.    TREATMENT EDUCATION    Today I met with the patient to discuss the chemo/biotherapy regimen recommended for treatment of Cancer of sigmoid    Clear cell renal cell carcinoma, left  .  The patient was given explanation of treatment premed side effects including office policy that prohibits patients to drive if sedating medications are administered, MD explanation given regarding benefits, side effects, toxicities and goals of treatment.  The patient received a Chemotherapy/Biotherapy Plan Summary including diagnosis and explanation of specific treatment plan.    SIDE EFFECTS:  Common side effects were discussed with the patient and/or significant other.  Discussion included where applicable hair loss/discoloration, anemia/fatigue, infection/chills/fever, appetite, bleeding risk/precautions, constipation, diarrhea, mouth sores, taste  alteration, loss of appetite, nausea/vomiting, peripheral neuropathy, skin/nail changes, rash, muscle aches/weakness, photosensitivity, weight gain/loss, hearing loss, dizziness, menopausal symptoms, menstrual irregularity, sterility, high blood pressure, heart damage, liver damage, lung damage, kidney damage, DVT/PE risk, fluid retention, pleural/pericardial effusion, somnolence, electrolyte/LFT imbalance, vein exercises and/or the possible need for vascular access/port placement.  The patient was advised that although uncommon, leakage of an infused medication from the vein or venous access device may lead to skin breakdown and/or other tissue damage.  The patient was advised that he/she may have pain, bleeding, and/or bruising from the insertion of a needle in their vein or venous access device (port).  The patient was further advised that, in spite of proper technique, infection with redness and irritation may rarely occur at the site where the needle was inserted.  The patient was advised that if complications occur, additional medical treatment is available.  Finally, where applicable we have reviewed rare but potential immune mediated side effects including shortness of breath, cough, chest pain (pneumonitis), abdominal pain, diarrhea (colitis), thyroiditis (hypothyroid or hyperthyroid), hepatitis and liver dysfunction, nephritis and renal dysfunction.    Discussion also included side effects specific to drugs in the treatment plan, specifically:    Treatment Plans       Name Type Plan Dates Plan Provider         Active    OP KIDNEY Pembrolizumab 200 mg ONCOLOGY TREATMENT 4/30/2025 - Present Krissy March MD                      Questions answered and additional information discussed on topics including:  Anemia, Thrombocytopenia, Neutropenia, Nutrition and appetite changes, Constipation, Diarrhea, Nausea & vomiting, Mouth sores, Alopecia, Infertility, Intimate activity, Nervous system changes, Skin  & nail changes, Organ toxicities, and Home care       Assessment and Plan:    Diagnoses and all orders for this visit:    1. Cancer of sigmoid (Primary)    2. Clear cell renal cell carcinoma, left      No orders of the defined types were placed in this encounter.        The patient and I have reviewed their diagnosis and scheduled treatment plan. Needs assessment was completed where applicable including genetics, psychosocial needs, barriers to care, VAD evaluation, advanced care planning, survivorship, and palliative care services where indicated. Referrals have been ordered as appropriate based upon evaluation today and patient desires.   Chemo/biotherapy teaching was completed today and consent obtained. See separate documentation for further details.  Adequate time was given to answer questions.  Patient made aware of their care team members and contact information if they have questions or problems throughout the treatment course.  Discussion held and written information provided describing frequency of office visits and ongoing monitoring throughout the treatment plan.     Reviewed with patient any prescribed medication sent to pharmacy.  Education provided regarding proper storage, safe handling, and proper disposal of unused medication.  Proper handling of body fluids and waste discussed and written information provided.  If appropriate, patient had pretreatment labs drawn today.    Learning assessment completed at initial patient encounter. See separate flowsheet. Chemo/biotherapy education comprehension assessed at today's visit.    I spent 60 minutes caring for Vance on this date of service. This time includes time spent by me in the following activities: preparing for the visit, reviewing tests, obtaining and/or reviewing a separately obtained history, performing a medically appropriate examination and/or evaluation, counseling and educating the patient/family/caregiver, ordering medications, tests, or  procedures, referring and communicating with other health care professionals, documenting information in the medical record, independently interpreting results and communicating that information with the patient/family/caregiver, and care coordination.     Barb Knight, APRN   05/14/25

## 2025-05-14 ENCOUNTER — NUTRITION (OUTPATIENT)
Dept: ONCOLOGY | Facility: CLINIC | Age: 55
End: 2025-05-14
Payer: COMMERCIAL

## 2025-05-14 ENCOUNTER — CLINICAL SUPPORT (OUTPATIENT)
Dept: ONCOLOGY | Facility: CLINIC | Age: 55
End: 2025-05-14
Payer: COMMERCIAL

## 2025-05-14 ENCOUNTER — OFFICE VISIT (OUTPATIENT)
Dept: ONCOLOGY | Facility: CLINIC | Age: 55
End: 2025-05-14
Payer: COMMERCIAL

## 2025-05-14 ENCOUNTER — LAB (OUTPATIENT)
Dept: LAB | Facility: HOSPITAL | Age: 55
End: 2025-05-14
Payer: COMMERCIAL

## 2025-05-14 VITALS
TEMPERATURE: 98.2 F | DIASTOLIC BLOOD PRESSURE: 86 MMHG | BODY MASS INDEX: 34.65 KG/M2 | HEIGHT: 70 IN | HEART RATE: 88 BPM | WEIGHT: 242 LBS | OXYGEN SATURATION: 100 % | SYSTOLIC BLOOD PRESSURE: 127 MMHG

## 2025-05-14 DIAGNOSIS — C64.2 CLEAR CELL RENAL CELL CARCINOMA, LEFT: ICD-10-CM

## 2025-05-14 DIAGNOSIS — C18.7 CANCER OF SIGMOID: Primary | ICD-10-CM

## 2025-05-14 LAB
ALBUMIN SERPL-MCNC: 4.1 G/DL (ref 3.5–5.2)
ALBUMIN/GLOB SERPL: 1.8 G/DL
ALP SERPL-CCNC: 84 U/L (ref 39–117)
ALT SERPL W P-5'-P-CCNC: 29 U/L (ref 1–41)
ANION GAP SERPL CALCULATED.3IONS-SCNC: 6.9 MMOL/L (ref 5–15)
AST SERPL-CCNC: 20 U/L (ref 1–40)
BILIRUB SERPL-MCNC: 0.4 MG/DL (ref 0–1.2)
BUN SERPL-MCNC: 12 MG/DL (ref 6–20)
BUN/CREAT SERPL: 6.8 (ref 7–25)
CALCIUM SPEC-SCNC: 8.9 MG/DL (ref 8.6–10.5)
CHLORIDE SERPL-SCNC: 105 MMOL/L (ref 98–107)
CO2 SERPL-SCNC: 28.1 MMOL/L (ref 22–29)
CREAT SERPL-MCNC: 1.76 MG/DL (ref 0.76–1.27)
EGFRCR SERPLBLD CKD-EPI 2021: 45.1 ML/MIN/1.73
GLOBULIN UR ELPH-MCNC: 2.3 GM/DL
GLUCOSE SERPL-MCNC: 103 MG/DL (ref 65–99)
POTASSIUM SERPL-SCNC: 4.1 MMOL/L (ref 3.5–5.2)
PROT SERPL-MCNC: 6.4 G/DL (ref 6–8.5)
SODIUM SERPL-SCNC: 140 MMOL/L (ref 136–145)

## 2025-05-14 PROCEDURE — 80053 COMPREHEN METABOLIC PANEL: CPT | Performed by: INTERNAL MEDICINE

## 2025-05-14 PROCEDURE — 36415 COLL VENOUS BLD VENIPUNCTURE: CPT

## 2025-05-14 NOTE — PROGRESS NOTES
"                 Nutrition Assessment  Vance Pearl Jr.    Height: 5'10\"  Weight: 239#  BMI: 34.3  Weight Hx:   Wt Readings from Last 20 Encounters:   05/14/25 110 kg (242 lb)   05/02/25 108 kg (239 lb)   04/28/25 109 kg (240 lb)   04/28/25 108 kg (238 lb)   04/16/25 112 kg (247 lb 6.4 oz)   03/24/25 118 kg (260 lb)   03/19/25 117 kg (259 lb)   03/17/25 117 kg (258 lb)   03/10/25 119 kg (261 lb 14.5 oz)   09/03/24 119 kg (263 lb)   03/13/24 121 kg (266 lb)     IBW: 166# (143.9%)  UBW: 143.9% (166%)    Nutrition Questionnaire    Food Allergies: Pt denied allergies at this time    Chewing Problems: Pt denied chewing problems at this time.     Swallowing Problems: Pt denied problems swallowing at this time.    Who does the cooking & shopping: Pt's wife, Reg    Nausea/Vomiting/Diarrhea: Pt denies n/v/d/c    Appetite: (poor) 1 2 3 4 5 6 7 8 9 10 (excellent): 10    24-Hour Diet Recall:  Breakfast - old fashioned oatmeal w/peanut butter & maple syrup  Lunch - beans & rice, water  Dinner - toasted sourdough bread w/pb&j  Snacks - orange, apple  Water > 64 oz/day  **Pt started a vegan diet one month prior to diagnosis to improve health. He stated he was able to get off blood pressure meds after only two weeks of the diet.    Discussion: Met the pt and his wife, Reg, to provide new-pt diet education. We reviewed possible side effects of his treatment and how it may impact his ability to eat and tolerate food. We discussed the importance of weight maintenance, consuming adequate calories and protein, even when appetite is low, taste changes occur, and energy is low, pt verbalized understanding. We reviewed ways to manage side effects with diet, pt verbalized understanding.    All questions and concerns were addressed and answered. I provided my contact information and encouraged them to call or schedule an appointment as needed.      Penny Andrade, MS,RD,LD-[place],CD-[place]  Registered Dietitian                  "

## 2025-05-14 NOTE — PROGRESS NOTES
OSW met with patient as part of the treatment preparation process.  Pt accompanied by his wife.  Introductions made and contact information provided.  Reviewed supports and services provided at [facility].  Discussed advance directives.  Pt has none in place at this time.  He does not express an interest in completing any at this time.Pt encouraged to reach out with any questions.  Pt continues to work and does not indicate a need to complete FMLA.  If that changes, pt will reach out to his provider.  Pt does not identify any concerns or barriers to treatment.  He has a good support system in place.  Pt encouraged to contact [facility] if any support or resources are needed.  Pt v/u.

## 2025-05-15 ENCOUNTER — ANCILLARY ORDERS (OUTPATIENT)
Dept: MRI IMAGING | Facility: HOSPITAL | Age: 55
End: 2025-05-15
Payer: COMMERCIAL

## 2025-05-15 ENCOUNTER — HOSPITAL ENCOUNTER (OUTPATIENT)
Dept: GENERAL RADIOLOGY | Facility: HOSPITAL | Age: 55
Discharge: HOME | End: 2025-05-15
Payer: COMMERCIAL

## 2025-05-15 ENCOUNTER — HOSPITAL ENCOUNTER (OUTPATIENT)
Dept: MRI IMAGING | Facility: HOSPITAL | Age: 55
Discharge: HOME | End: 2025-05-15
Payer: COMMERCIAL

## 2025-05-15 DIAGNOSIS — C64.2 CLEAR CELL RENAL CELL CARCINOMA, LEFT: ICD-10-CM

## 2025-05-15 DIAGNOSIS — Z01.89 ENCOUNTER FOR IMAGING TO SCREEN FOR METAL PRIOR TO MRI: Primary | ICD-10-CM

## 2025-05-15 DIAGNOSIS — Z01.89 ENCOUNTER FOR IMAGING TO SCREEN FOR METAL PRIOR TO MRI: ICD-10-CM

## 2025-05-15 DIAGNOSIS — C64.2 RENAL CELL CARCINOMA OF LEFT KIDNEY: ICD-10-CM

## 2025-05-15 PROCEDURE — A9579 GAD-BASE MR CONTRAST NOS,1ML: HCPCS | Performed by: INTERNAL MEDICINE

## 2025-05-15 PROCEDURE — 25010000002 GADOTERIDOL PER 1 ML: Performed by: INTERNAL MEDICINE

## 2025-05-15 PROCEDURE — 70553 MRI BRAIN STEM W/O & W/DYE: CPT

## 2025-05-15 PROCEDURE — 70140 X-RAY EXAM OF FACIAL BONES: CPT

## 2025-05-15 RX ADMIN — GADOTERIDOL 22 ML: 279.3 INJECTION, SOLUTION INTRAVENOUS at 17:56

## 2025-05-16 ENCOUNTER — HOSPITAL ENCOUNTER (OUTPATIENT)
Dept: ONCOLOGY | Facility: HOSPITAL | Age: 55
Discharge: HOME | End: 2025-05-16
Payer: COMMERCIAL

## 2025-05-16 VITALS
HEIGHT: 70 IN | HEART RATE: 97 BPM | RESPIRATION RATE: 14 BRPM | SYSTOLIC BLOOD PRESSURE: 148 MMHG | DIASTOLIC BLOOD PRESSURE: 100 MMHG | TEMPERATURE: 96 F | WEIGHT: 241 LBS | OXYGEN SATURATION: 98 % | BODY MASS INDEX: 34.5 KG/M2

## 2025-05-16 DIAGNOSIS — C64.2 RENAL CELL CARCINOMA OF LEFT KIDNEY: Primary | ICD-10-CM

## 2025-05-16 LAB
ALBUMIN SERPL-MCNC: 4 G/DL (ref 3.5–5.2)
ALBUMIN/GLOB SERPL: 1.7 G/DL
ALP SERPL-CCNC: 83 U/L (ref 39–117)
ALT SERPL W P-5'-P-CCNC: 24 U/L (ref 1–41)
ANION GAP SERPL CALCULATED.3IONS-SCNC: 7.6 MMOL/L (ref 5–15)
AST SERPL-CCNC: 20 U/L (ref 1–40)
BASOPHILS # BLD AUTO: 0.04 10*3/MM3 (ref 0–0.2)
BASOPHILS NFR BLD AUTO: 0.5 % (ref 0–1.5)
BILIRUB SERPL-MCNC: 0.3 MG/DL (ref 0–1.2)
BUN SERPL-MCNC: 12 MG/DL (ref 6–20)
BUN/CREAT SERPL: 6.5 (ref 7–25)
CALCIUM SPEC-SCNC: 8.7 MG/DL (ref 8.6–10.5)
CHLORIDE SERPL-SCNC: 103 MMOL/L (ref 98–107)
CO2 SERPL-SCNC: 28.4 MMOL/L (ref 22–29)
CREAT SERPL-MCNC: 1.84 MG/DL (ref 0.76–1.27)
DEPRECATED RDW RBC AUTO: 41.1 FL (ref 37–54)
EGFRCR SERPLBLD CKD-EPI 2021: 42.8 ML/MIN/1.73
EOSINOPHIL # BLD AUTO: 0.26 10*3/MM3 (ref 0–0.4)
EOSINOPHIL NFR BLD AUTO: 3.2 % (ref 0.3–6.2)
ERYTHROCYTE [DISTWIDTH] IN BLOOD BY AUTOMATED COUNT: 14.1 % (ref 12.3–15.4)
GLOBULIN UR ELPH-MCNC: 2.3 GM/DL
GLUCOSE SERPL-MCNC: 137 MG/DL (ref 65–99)
HCT VFR BLD AUTO: 38.4 % (ref 37.5–51)
HGB BLD-MCNC: 12.3 G/DL (ref 13–17.7)
LYMPHOCYTES # BLD AUTO: 1.84 10*3/MM3 (ref 0.7–3.1)
LYMPHOCYTES NFR BLD AUTO: 22.8 % (ref 19.6–45.3)
MCH RBC QN AUTO: 26.3 PG (ref 26.6–33)
MCHC RBC AUTO-ENTMCNC: 32 G/DL (ref 31.5–35.7)
MCV RBC AUTO: 82.1 FL (ref 79–97)
MONOCYTES # BLD AUTO: 0.66 10*3/MM3 (ref 0.1–0.9)
MONOCYTES NFR BLD AUTO: 8.2 % (ref 5–12)
NEUTROPHILS NFR BLD AUTO: 5.26 10*3/MM3 (ref 1.7–7)
NEUTROPHILS NFR BLD AUTO: 65.3 % (ref 42.7–76)
PLATELET # BLD AUTO: 285 10*3/MM3 (ref 140–450)
PMV BLD AUTO: 10.4 FL (ref 6–12)
POTASSIUM SERPL-SCNC: 4.1 MMOL/L (ref 3.5–5.2)
PROT SERPL-MCNC: 6.3 G/DL (ref 6–8.5)
RBC # BLD AUTO: 4.68 10*6/MM3 (ref 4.14–5.8)
SODIUM SERPL-SCNC: 139 MMOL/L (ref 136–145)
T4 FREE SERPL-MCNC: 1.07 NG/DL (ref 0.92–1.68)
TSH SERPL DL<=0.05 MIU/L-ACNC: 1.52 UIU/ML (ref 0.27–4.2)
WBC NRBC COR # BLD AUTO: 8.06 10*3/MM3 (ref 3.4–10.8)

## 2025-05-16 PROCEDURE — 80050 GENERAL HEALTH PANEL: CPT | Performed by: STUDENT IN AN ORGANIZED HEALTH CARE EDUCATION/TRAINING PROGRAM

## 2025-05-16 PROCEDURE — 96413 CHEMO IV INFUSION 1 HR: CPT

## 2025-05-16 PROCEDURE — 84439 ASSAY OF FREE THYROXINE: CPT | Performed by: STUDENT IN AN ORGANIZED HEALTH CARE EDUCATION/TRAINING PROGRAM

## 2025-05-16 PROCEDURE — 25010000002 PEMBROLIZUMAB 100 MG/4ML SOLUTION 4 ML VIAL: Performed by: STUDENT IN AN ORGANIZED HEALTH CARE EDUCATION/TRAINING PROGRAM

## 2025-05-16 RX ORDER — SODIUM CHLORIDE 9 MG/ML
20 INJECTION, SOLUTION INTRAVENOUS ONCE
Status: DISCONTINUED | OUTPATIENT
Start: 2025-05-16 | End: 2025-05-17 | Stop reason: HOSPADM

## 2025-05-16 RX ORDER — ONDANSETRON 8 MG/1
8 TABLET, FILM COATED ORAL 3 TIMES DAILY PRN
Qty: 30 TABLET | Refills: 5 | Status: SHIPPED | OUTPATIENT
Start: 2025-05-16

## 2025-05-16 RX ORDER — SODIUM CHLORIDE 9 MG/ML
20 INJECTION, SOLUTION INTRAVENOUS ONCE
Status: CANCELLED | OUTPATIENT
Start: 2025-05-16

## 2025-05-16 RX ADMIN — SODIUM CHLORIDE 200 MG: 9 INJECTION, SOLUTION INTRAVENOUS at 15:07

## 2025-05-19 ENCOUNTER — TELEPHONE (OUTPATIENT)
Dept: FAMILY MEDICINE CLINIC | Facility: CLINIC | Age: 55
End: 2025-05-19
Payer: COMMERCIAL

## 2025-05-19 DIAGNOSIS — R06.83 SNORING: Primary | ICD-10-CM

## 2025-05-19 NOTE — TELEPHONE ENCOUNTER
I called patient on 5/19/2025 at 10:00 AM to discuss his sleep study.  He reports he does not feel like the sleep study is accurate as he was sweating and the monitor came off of him multiple times at night.  He reports he did not sleep really well and would like a referral to a sleep medicine doctor.  I placed a referral to sleep medicine.  Patient understood plan of care.    Electronically signed by RACHEL Perdomo, 05/19/25, 10:03 AM EDT.

## 2025-05-28 ENCOUNTER — OFFICE VISIT (OUTPATIENT)
Age: 55
End: 2025-05-28
Payer: COMMERCIAL

## 2025-05-28 VITALS
BODY MASS INDEX: 33.6 KG/M2 | SYSTOLIC BLOOD PRESSURE: 105 MMHG | HEIGHT: 71 IN | WEIGHT: 240 LBS | TEMPERATURE: 98.4 F | DIASTOLIC BLOOD PRESSURE: 80 MMHG | HEART RATE: 89 BPM | OXYGEN SATURATION: 99 %

## 2025-05-28 DIAGNOSIS — N28.89 RENAL MASS, LEFT: ICD-10-CM

## 2025-05-28 DIAGNOSIS — C18.7 CANCER OF SIGMOID: Primary | ICD-10-CM

## 2025-05-28 PROCEDURE — 99024 POSTOP FOLLOW-UP VISIT: CPT | Performed by: STUDENT IN AN ORGANIZED HEALTH CARE EDUCATION/TRAINING PROGRAM

## 2025-05-29 NOTE — PROGRESS NOTES
Colorectal Surgery Followup Note    ID:  Vance Pearl Jr.;   : 1970  DATE OF VISIT: 2025    Chief Complaint  Post-op (Post op colon resection 2025)       Subjective    Vance is doing well.  He currently has no complaints.  He is having regular bowel movements.  He denies any nausea or vomiting.  Denies any fevers or chills.  He has started chemotherapy and is doing well.  He reports some pain on one of his testicles.  The pain has been improving.  Exam  General:  No acute distress  Head: Normocephalic, atraumatic  Neuro: Alert and oriented    Abdomen:  Soft, non-tender, non-distended, no hernias, no hepatomegaly, no splenomegaly. No abnormal, audible bowel sounds.  All incisions well-healed without any surrounding erythema or drainage.    Assessment  - Sigmoid Colon Cancer s/p resection.  Final path pT3 pN0c M0  - Left Renal clear-cell carcinoma, status post left nephrectomy    Plan / Recommendations  - Patient has recovered well without any postoperative concerns  - No dietary or physical activity restrictions  - We will plan to repeat his colonoscopy in 1 year time from his surgery  - I will send CT DNA for prognostic reasons  - Continue with chemotherapy.   - Follow-up in 6 months      Arturo Peoples MD  Colon and Rectal Surgery   [facility]

## 2025-06-03 LAB
LAB AP CASE REPORT: NORMAL
LAB AP DIAGNOSIS COMMENT: NORMAL
Lab: NORMAL
PATH REPORT.ADDENDUM SPEC: NORMAL
PATH REPORT.FINAL DX SPEC: NORMAL
PATH REPORT.GROSS SPEC: NORMAL

## 2025-06-04 ENCOUNTER — TELEPHONE (OUTPATIENT)
Dept: ONCOLOGY | Facility: HOSPITAL | Age: 55
End: 2025-06-04
Payer: COMMERCIAL

## 2025-06-04 NOTE — TELEPHONE ENCOUNTER
Attempted to call the patient to see if he could come in sooner in the day due to staff meeting.  Left message.

## 2025-06-09 ENCOUNTER — OFFICE VISIT (OUTPATIENT)
Dept: CARDIAC SURGERY | Facility: CLINIC | Age: 55
End: 2025-06-09
Payer: COMMERCIAL

## 2025-06-09 VITALS
BODY MASS INDEX: 34.65 KG/M2 | DIASTOLIC BLOOD PRESSURE: 84 MMHG | WEIGHT: 242 LBS | SYSTOLIC BLOOD PRESSURE: 123 MMHG | TEMPERATURE: 98 F | HEART RATE: 75 BPM | RESPIRATION RATE: 18 BRPM | HEIGHT: 70 IN | OXYGEN SATURATION: 99 %

## 2025-06-09 DIAGNOSIS — I10 PRIMARY HYPERTENSION: ICD-10-CM

## 2025-06-09 DIAGNOSIS — I77.810 ASCENDING AORTA DILATATION: Primary | ICD-10-CM

## 2025-06-09 DIAGNOSIS — E66.9 OBESITY (BMI 30-39.9): ICD-10-CM

## 2025-06-09 PROCEDURE — 99204 OFFICE O/P NEW MOD 45 MIN: CPT

## 2025-06-10 ENCOUNTER — TELEPHONE (OUTPATIENT)
Dept: ONCOLOGY | Facility: CLINIC | Age: 55
End: 2025-06-10
Payer: COMMERCIAL

## 2025-06-11 ENCOUNTER — PATIENT ROUNDING (BHMG ONLY) (OUTPATIENT)
Dept: CARDIAC SURGERY | Facility: CLINIC | Age: 55
End: 2025-06-11
Payer: COMMERCIAL

## 2025-06-11 NOTE — PROGRESS NOTES
A My Chart message has been sent to the patient for PATIENT ROUNDING with [facility]

## 2025-06-13 ENCOUNTER — HOSPITAL ENCOUNTER (OUTPATIENT)
Dept: ONCOLOGY | Facility: HOSPITAL | Age: 55
Discharge: HOME | End: 2025-06-13
Payer: COMMERCIAL

## 2025-06-13 VITALS
HEIGHT: 70 IN | RESPIRATION RATE: 16 BRPM | WEIGHT: 239.8 LBS | OXYGEN SATURATION: 99 % | DIASTOLIC BLOOD PRESSURE: 80 MMHG | BODY MASS INDEX: 34.33 KG/M2 | SYSTOLIC BLOOD PRESSURE: 143 MMHG | HEART RATE: 85 BPM | TEMPERATURE: 98.5 F

## 2025-06-13 DIAGNOSIS — C64.2 RENAL CELL CARCINOMA OF LEFT KIDNEY: Primary | ICD-10-CM

## 2025-06-13 LAB
ALP BLD-CCNC: 82 U/L (ref 53–128)
BASOPHILS # BLD AUTO: 0.03 10*3/MM3 (ref 0–0.2)
BASOPHILS NFR BLD AUTO: 0.3 % (ref 0–1.5)
BUN BLDA-MCNC: 14 MG/DL (ref 7–22)
CALCIUM BLD QL: 8.8 MG/DL (ref 8–10.3)
CHLORIDE BLDA-SCNC: 110 MMOL/L (ref 98–108)
CO2 BLDA-SCNC: 28 MMOL/L (ref 18–33)
CREAT BLDA-MCNC: 1.8 MG/DL (ref 0.6–1.2)
DEPRECATED RDW RBC AUTO: 42.2 FL (ref 37–54)
EGFRCR SERPLBLD CKD-EPI 2021: 43.9 ML/MIN/1.73
EOSINOPHIL # BLD AUTO: 0.16 10*3/MM3 (ref 0–0.4)
EOSINOPHIL NFR BLD AUTO: 1.9 % (ref 0.3–6.2)
ERYTHROCYTE [DISTWIDTH] IN BLOOD BY AUTOMATED COUNT: 14.4 % (ref 12.3–15.4)
GLUCOSE BLDC GLUCOMTR-MCNC: 116 MG/DL (ref 73–118)
HCT VFR BLD AUTO: 39.3 % (ref 37.5–51)
HGB BLD-MCNC: 12.3 G/DL (ref 13–17.7)
LYMPHOCYTES # BLD AUTO: 1.86 10*3/MM3 (ref 0.7–3.1)
LYMPHOCYTES NFR BLD AUTO: 21.7 % (ref 19.6–45.3)
MCH RBC QN AUTO: 25.6 PG (ref 26.6–33)
MCHC RBC AUTO-ENTMCNC: 31.3 G/DL (ref 31.5–35.7)
MCV RBC AUTO: 81.9 FL (ref 79–97)
MONOCYTES # BLD AUTO: 0.84 10*3/MM3 (ref 0.1–0.9)
MONOCYTES NFR BLD AUTO: 9.8 % (ref 5–12)
NEUTROPHILS NFR BLD AUTO: 5.7 10*3/MM3 (ref 1.7–7)
NEUTROPHILS NFR BLD AUTO: 66.3 % (ref 42.7–76)
PLATELET # BLD AUTO: 274 10*3/MM3 (ref 140–450)
PMV BLD AUTO: 11.3 FL (ref 6–12)
POC ALBUMIN: 3.5 G/L (ref 3.3–5.5)
POC ALT (SGPT): 19 U/L (ref 10–47)
POC AST (SGOT): 26 U/L (ref 11–38)
POC TOTAL BILIRUBIN: 0.8 MG/DL (ref 0.2–1.6)
POC TOTAL PROTEIN: 6.7 G/DL (ref 6.4–8.1)
POTASSIUM BLDA-SCNC: 4.5 MMOL/L (ref 3.6–5.1)
RBC # BLD AUTO: 4.8 10*6/MM3 (ref 4.14–5.8)
SODIUM BLD-SCNC: 141 MMOL/L (ref 128–145)
WBC NRBC COR # BLD AUTO: 8.59 10*3/MM3 (ref 3.4–10.8)

## 2025-06-13 PROCEDURE — 85025 COMPLETE CBC W/AUTO DIFF WBC: CPT | Performed by: INTERNAL MEDICINE

## 2025-06-13 PROCEDURE — 96413 CHEMO IV INFUSION 1 HR: CPT

## 2025-06-13 PROCEDURE — 25010000002 PEMBROLIZUMAB 100 MG/4ML SOLUTION 4 ML VIAL: Performed by: PHYSICIAN ASSISTANT

## 2025-06-13 PROCEDURE — 80053 COMPREHEN METABOLIC PANEL: CPT

## 2025-06-13 RX ORDER — SODIUM CHLORIDE 9 MG/ML
20 INJECTION, SOLUTION INTRAVENOUS ONCE
Status: DISCONTINUED | OUTPATIENT
Start: 2025-06-13 | End: 2025-06-14 | Stop reason: HOSPADM

## 2025-06-13 RX ORDER — SODIUM CHLORIDE 9 MG/ML
20 INJECTION, SOLUTION INTRAVENOUS ONCE
Status: CANCELLED | OUTPATIENT
Start: 2025-06-13

## 2025-06-13 RX ADMIN — SODIUM CHLORIDE 200 MG: 9 INJECTION, SOLUTION INTRAVENOUS at 15:04

## 2025-06-17 ENCOUNTER — TELEPHONE (OUTPATIENT)
Dept: ONCOLOGY | Facility: CLINIC | Age: 55
End: 2025-06-17
Payer: COMMERCIAL

## 2025-06-17 NOTE — TELEPHONE ENCOUNTER
Pt returned my call and left a voicemail stating that his cardiologist is Dr. Sujit Quinteros at [facility].  Will call his office and get pt's records for Dr. March to review.

## 2025-06-17 NOTE — TELEPHONE ENCOUNTER
Spoke wellington Calderon at Dr. Quinteros's office and requested his last office note.  She states that she will fax it once he has completed his note.  She states that he is not scheduled for a follow up at this time but most likely will be a one year follow up.

## 2025-06-17 NOTE — TELEPHONE ENCOUNTER
----- Message from Krissy March sent at 6/8/2025  4:33 PM EDT -----  Suma,  I called  Mr. Pearl but had to leave a message for him to call back.  There have been reports of Keytruda induced inflammation of large blood vessels including the aorta causing aortitis, progression of aneurysm and rupture.  However this is a rare but a  possible  complication that can occur. If he continues on Keytruda, he will need to be closely monitored by his vascular physician with  imaging to assess the aneurysm .  Also lets find out who he is seeing with vascular surgery so we can get records and  opinion on this as well.    Thank you

## 2025-06-17 NOTE — TELEPHONE ENCOUNTER
Attempted to follow up w/ pt regarding message from Dr. March.  Voicemail left for pt to return my call.

## 2025-06-17 NOTE — PROGRESS NOTES
6/17/2025      Subjective:      Leann Duke APRN    Chief Complaint: Incidental finding of ascending aortic dilatation    History of Present Illness:       Dear Lenan Hawkins APRN and Colleagues,    It was nice to see Vance Pearl  in consultation at your request. He is a 54 y.o. male with hypertension, kidney cancer, sigmoid cancer, who had a CT of the chest and an incidental finding of ascending aortic dilatation.  The CT of chest on 3/13/2025 was reviewed and the ascending aorta measures maximally at 4.1 cm.  He denies shortness of breath, chest/back pain, numbness/tingling/pain of extremities.  No vascular deficiencies or hyperextensible or hypermobile joints are noted on exam.  The patient's family history is negative for aneurysms or dissections, negative for connective tissue disease, and negative for coronary disease in first degree family members.      Social history:      Patient Active Problem List   Diagnosis    Primary hypertension    Obesity (BMI 30-39.9)    Encounter for annual physical exam    Positive colorectal cancer screening using Cologuard test    Cancer of sigmoid    ACE-inhibitor cough    Renal cell carcinoma    Ascending aorta dilatation    Snoring       Past Medical History:   Diagnosis Date    Adenocarcinoma of sigmoid colon 03/10/2025    Colon cancer 4/2025    Colon cancer screening 10/26/2024    Cologuard positive    GERD (gastroesophageal reflux disease)     Hypertension 2018    no longer taking BP meds since changing his diet/ weight loss    Left kidney mass 03/13/2025       Past Surgical History:   Procedure Laterality Date    BICEPS TENDON REPAIR      COLON RESECTION N/A 04/16/2025    Procedure: COLON RESECTION LOW ANTERIOR LAPAROSCOPIC WITH DAVINCI ROBOT;  Surgeon: Arturo Peoples MD;  Location: [facility];  Service: Robotics - DaVinci;  Laterality: N/A;    COLONOSCOPY N/A 03/10/2025    Procedure: COLONOSCOPY with POLYPECTOMY, SCLEROTHERAPY with INK TATOO, &  BIOPSY;  Surgeon: Arturo Peoples MD;  Location: [facility];  Service: General;  Laterality: N/A;  POST: DIVERTICULOSIS, COLON POLYPS, COLON MASS    CYSTOSCOPY N/A 04/16/2025    Procedure: CYSTOSCOPY, ICG INJECTION INTO URETERS, CARLSON INSERTION;  Surgeon: Arturo Peoples MD;  Location: [facility] MAIN OR;  Service: Robotics - DaVinci;  Laterality: N/A;    LYMPH NODE BIOPSY  4/2025    Same time as colon and kidney removal surgery.    NEPHRECTOMY Left 04/16/2025    Procedure: RADICAL NEPHRECTOMY LAPAROSCOPIC WITH DAVINCI ROBOT;  Surgeon: Leno Zaidi MD;  Location: [facility] MAIN OR;  Service: Robotics - DaVinci;  Laterality: Left;    SIGMOIDOSCOPY N/A 04/16/2025    Procedure: SIGMOIDOSCOPY FLEXIBLE;  Surgeon: Arturo Peoples MD;  Location: [facility] MAIN OR;  Service: General;  Laterality: N/A;       No Known Allergies    No current outpatient medications on file.    Social History     Socioeconomic History    Marital status:    Tobacco Use    Smoking status: Never     Passive exposure: Never    Smokeless tobacco: Never   Vaping Use    Vaping status: Never Used   Substance and Sexual Activity    Alcohol use: Never    Drug use: Never    Sexual activity: Yes     Partners: Female     Birth control/protection: None       Family History   Problem Relation Age of Onset    Basal cell carcinoma Mother 72        Rectal    No Known Problems Sister     Uterine cancer Maternal Grandmother 38    Liver cancer Maternal Grandmother 76    No Known Problems Half-Brother     No Known Problems Half-Sister     No Known Problems Half-Sister     Oligodendroglioma Daughter 9    Lung cancer Maternal Aunt 64    Lung cancer Maternal Aunt 59    Lymphoma Maternal Aunt 59    Lung cancer Paternal Aunt 66    Breast cancer Paternal Aunt 75    Lung cancer Paternal Aunt 62       The following portions of the patient's history were reviewed and updated as appropriate: He  has a past medical history of Adenocarcinoma of sigmoid colon (03/10/2025), Colon  cancer (4/2025), Colon cancer screening (10/26/2024), GERD (gastroesophageal reflux disease), Hypertension (2018), and Left kidney mass (03/13/2025).  He does not have any pertinent problems on file.  He  has a past surgical history that includes Colonoscopy (N/A, 03/10/2025); Biceps tendon repair; Colectomy (N/A, 04/16/2025); Cystoscopy (N/A, 04/16/2025); Sigmoidoscopy (N/A, 04/16/2025); Nephrectomy (Left, 04/16/2025); and Lymph node biopsy (4/2025).  His family history includes Basal cell carcinoma (age of onset: 72) in his mother; Breast cancer (age of onset: 75) in his paternal aunt; Liver cancer (age of onset: 76) in his maternal grandmother; Lung cancer (age of onset: 59) in his maternal aunt; Lung cancer (age of onset: 62) in his paternal aunt; Lung cancer (age of onset: 64) in his maternal aunt; Lung cancer (age of onset: 66) in his paternal aunt; Lymphoma (age of onset: 59) in his maternal aunt; No Known Problems in his half-brother, half-sister, half-sister, and sister; Oligodendroglioma (age of onset: 9) in his daughter; Uterine cancer (age of onset: 38) in his maternal grandmother.  He  reports that he has never smoked. He has never been exposed to tobacco smoke. He has never used smokeless tobacco. He reports that he does not drink alcohol and does not use drugs.  No current outpatient medications on file.     No current facility-administered medications for this visit.     No current outpatient medications on file prior to visit.     No current facility-administered medications on file prior to visit.     He has no known allergies..    Review of Systems:  Review of Systems    Physical Exam:    Vital Signs:  Weight: 110 kg (242 lb)   Body mass index is 34.72 kg/m².  Temp: 98 °F (36.7 °C)   Heart Rate: 75   BP: 123/84     Physical Exam     Assessment:     -Ascending aortic dilatation.  4.1 cm.  Follow-up in 1 year    Recommendation/Plan:     We discussed the importance of avoidance of over the counter  decongestants and stimulants, specifically pseudoephedrine, for hypertension and aneurysm management    Risk factor modification of hypertension with a goal blood pressure less than 130/80, hyperlipidemia optimization, and continued avoidance of tobacco/nicotine products.    Initiation of low aerobic exercise is indicated to help reduce body habitus, assist with blood pressure and cholesterol control.       Although risk of rupture is low, emergency department presentation is warranted for acute chest, back, or abdominal pain, syncope, or stroke like symptoms    Thank you for allowing us to participate in his care.    Regards,    Sujit Quinteros MD    ** all problems new to this practitioner, extensive review of records prior and during patient interview, >45 minutes in face to face conversation regarding treatment plan, education, and answering any questions the patient may have had

## 2025-06-17 NOTE — PROGRESS NOTES
Hematology/Oncology Outpatient Follow Up    PATIENT NAME:Vance Pearl Jr.  :1970  MRN: [id number]  PRIMARY CARE PHYSICIAN: Leann Duke APRN  REFERRING PHYSICIAN: No ref. provider found    Chief Complaint   Patient presents with    Follow-up     Cancer of sigmoid          HISTORY OF PRESENT ILLNESS:   54-year-old male who had a routine screening colonoscopy was found to have sigmoidal mass on 3/10/2025.  Review of the colonoscopy suggest I had a 3 cm polyp in the rectum, in mass the rest of the polyps were tubular adenoma and inflammatory polyp.There is partially obstructing infiltrative highly suspicious malignant mass.  The mass extended for over 4 cm.  Multiple biopsies with taken.  This mass was noted to be at 20 cm from anal verge based on proctoscopy findings.  Close additional polyp measuring up to 3 cm which was removed, transverse colon also had a 1 cm pedunculated polyp which was also removed     Pathology revealed invasive poorly differentiated adenocarcinoma  MMR testing showed intact MMR     Patient subsequently had a CEA done which was 2.23     CT scan of the chest, abdomen and pelvis for restaging basically showed a mild aneurysmal dilatation of the ascending aorta measuring 4.1 cm no evidence of metastatic disease in the chest.  The abdomen there was no evidence of liver lesions.  He has gallstones.  At the upper pole of the left kidney there is a mass measuring 7.3 cm x 5.8 cm worrisome for renal cell carcinoma.  There was no definite extension into the left main renal vein.  No suspicious mass on the right kidney .        Patient has been referred for further evaluation and management of the above findings.           Patient is   He has 2 children  Does not smoke  He does not drink alcohol  He is a  of a [employer]     Family history significant for daughter with brain tumor at the age of 90s oligodendroglioma  Sister had meningioma at the age of 54  His  mother had precancerous polyps maternal grandmother also has precancerous colonic polyps  2 maternal aunts with lung cancer     He is accompanied today by his spouse for this appointment.    4/16/2025 patient underwent left nephrectomy, pathology revealed clear-cell renal cell carcinoma measuring 6.5 cm completely excised.  It was grade 3 tumor extended into the renal sinus there was no sarcomatoid or rhabdoid features identified all margins are negative for invasive cancer.  Pathology stage is pT3a pNX M0.  Patient underwent sigmoidectomy final pathology revealed a 5.3 cm mass grade 2.  Tumor invades through muscularis propria into the pericolonic or perirectal tissue no evidence of lymphovascular invasion, perineural invasion no known treatment effect.  All margins are negative for invasive cancer distance to the closest margin was 7 cm all regional lymph nodes negative for malignancy total of 14 nodes were removed pathology stage is pT3 pN0 M0  5/16/2025: Patient was initiated on cycle 1 of Keytruda  6/13/2025: Patient was initiated on cycle 2 Keytruda  Past Medical History:   Diagnosis Date    Adenocarcinoma of sigmoid colon 03/10/2025    Colon cancer 4/2025    Colon cancer screening 10/26/2024    Cologuard positive    GERD (gastroesophageal reflux disease)     Hypertension 2018    no longer taking BP meds since changing his diet/ weight loss    Left kidney mass 03/13/2025       Past Surgical History:   Procedure Laterality Date    BICEPS TENDON REPAIR      COLON RESECTION N/A 04/16/2025    Procedure: COLON RESECTION LOW ANTERIOR LAPAROSCOPIC WITH DAVINCI ROBOT;  Surgeon: Arturo Peoples MD;  Location: [facility] MAIN OR;  Service: Robotics - DaVinci;  Laterality: N/A;    COLONOSCOPY N/A 03/10/2025    Procedure: COLONOSCOPY with POLYPECTOMY, SCLEROTHERAPY with INK TATOO, & BIOPSY;  Surgeon: Arturo Peoples MD;  Location: [facility] ENDOSCOPY;  Service: General;  Laterality: N/A;  POST: DIVERTICULOSIS, COLON POLYPS, COLON  MASS    CYSTOSCOPY N/A 04/16/2025    Procedure: CYSTOSCOPY, ICG INJECTION INTO URETERS, CARLSON INSERTION;  Surgeon: Arturo Peoples MD;  Location: [facility] MAIN OR;  Service: Robotics - DaVinci;  Laterality: N/A;    LYMPH NODE BIOPSY  4/2025    Same time as colon and kidney removal surgery.    NEPHRECTOMY Left 04/16/2025    Procedure: RADICAL NEPHRECTOMY LAPAROSCOPIC WITH DAVINCI ROBOT;  Surgeon: Leno Zaidi MD;  Location: [facility] MAIN OR;  Service: Robotics - DaVinci;  Laterality: Left;    SIGMOIDOSCOPY N/A 04/16/2025    Procedure: SIGMOIDOSCOPY FLEXIBLE;  Surgeon: Arturo Peoples MD;  Location: [facility] MAIN OR;  Service: General;  Laterality: N/A;       No current outpatient medications on file.    No Known Allergies    Family History   Problem Relation Age of Onset    Basal cell carcinoma Mother 72        Rectal    No Known Problems Sister     Uterine cancer Maternal Grandmother 38    Liver cancer Maternal Grandmother 76    No Known Problems Half-Brother     No Known Problems Half-Sister     No Known Problems Half-Sister     Oligodendroglioma Daughter 9    Lung cancer Maternal Aunt 64    Lung cancer Maternal Aunt 59    Lymphoma Maternal Aunt 59    Lung cancer Paternal Aunt 66    Breast cancer Paternal Aunt 75    Lung cancer Paternal Aunt 62       Cancer-related family history includes Breast cancer (age of onset: 75) in his paternal aunt; Liver cancer (age of onset: 76) in his maternal grandmother; Lung cancer (age of onset: 59) in his maternal aunt; Lung cancer (age of onset: 62) in his paternal aunt; Lung cancer (age of onset: 64) in his maternal aunt; Lung cancer (age of onset: 66) in his paternal aunt; Lymphoma (age of onset: 59) in his maternal aunt; Oligodendroglioma (age of onset: 9) in his daughter; Uterine cancer (age of onset: 38) in his maternal grandmother.    Social History     Tobacco Use    Smoking status: Never     Passive exposure: Never    Smokeless tobacco: Never   Vaping Use    Vaping status: Never  "Used   Substance Use Topics    Alcohol use: Never    Drug use: Never       I have reviewed and confirmed the accuracy of the patient's history: Chief complaint, HPI, ROS, and Subjective as entered by the MA/LPN/RN. Krissy March MD 06/19/25      SUBJECTIVE:     Overall he is tolerating Keytruda.  He has concerns regarding inflammation and what this could mean for his aortic aneurysm.  He is established with vascular    REVIEW OF SYSTEMS:  Review of Systems   Constitutional:  Negative for chills and fever.   HENT:  Negative for ear pain, mouth sores, nosebleeds and sore throat.    Eyes:  Negative for photophobia and visual disturbance.   Respiratory:  Negative for wheezing and stridor.    Cardiovascular:  Negative for chest pain and palpitations.   Gastrointestinal:  Negative for abdominal pain, diarrhea, nausea and vomiting.   Endocrine: Negative for cold intolerance and heat intolerance.   Genitourinary:  Negative for dysuria and hematuria.   Musculoskeletal:  Negative for joint swelling and neck stiffness.   Skin:  Negative for color change and rash.   Neurological:  Negative for seizures and syncope.   Hematological:  Negative for adenopathy.        No obvious bleeding   Psychiatric/Behavioral:  Negative for agitation, confusion and hallucinations.        OBJECTIVE:    Vitals:    06/19/25 1410   BP: 113/85   Pulse: 75   Resp: 18   Temp: 99.1 °F (37.3 °C)   TempSrc: Temporal   SpO2: 99%   Weight: 109 kg (240 lb)   Height: 177.8 cm (70\")   PainSc: 0-No pain     Body mass index is 34.44 kg/m².    ECOG  (0) Fully active, able to carry on all predisease performance without restriction    Physical Exam  Vitals and nursing note reviewed.   Constitutional:       General: He is not in acute distress.     Appearance: He is not diaphoretic.   HENT:      Head: Normocephalic and atraumatic.   Eyes:      General: No scleral icterus.        Right eye: No discharge.         Left eye: No discharge.      " Conjunctiva/sclera: Conjunctivae normal.   Neck:      Thyroid: No thyromegaly.   Cardiovascular:      Rate and Rhythm: Normal rate and regular rhythm.      Heart sounds: Normal heart sounds.      No friction rub. No gallop.   Pulmonary:      Effort: Pulmonary effort is normal. No respiratory distress.      Breath sounds: No stridor. No wheezing.   Abdominal:      General: Bowel sounds are normal.      Palpations: Abdomen is soft. There is no mass.      Tenderness: There is no abdominal tenderness. There is no guarding or rebound.   Musculoskeletal:         General: No tenderness. Normal range of motion.      Cervical back: Normal range of motion and neck supple.   Lymphadenopathy:      Cervical: No cervical adenopathy.   Skin:     General: Skin is warm.      Findings: No erythema or rash.   Neurological:      Mental Status: He is alert and oriented to person, place, and time.      Motor: No abnormal muscle tone.   Psychiatric:         Behavior: Behavior normal.         RECENT LABS  WBC   Date Value Ref Range Status   06/19/2025 8.08 3.40 - 10.80 10*3/mm3 Final     RBC   Date Value Ref Range Status   06/19/2025 4.95 4.14 - 5.80 10*6/mm3 Final     Hemoglobin   Date Value Ref Range Status   06/19/2025 12.5 (L) 13.0 - 17.7 g/dL Final   05/26/2023 15.9 13.0 - 17.7 g/dL Final   10/11/2022 15.4 13.0 - 17.7 g/dL Final     Hematocrit   Date Value Ref Range Status   06/19/2025 40.6 37.5 - 51.0 % Final   05/26/2023 48.2 37.5 - 51.0 % Final     MCV   Date Value Ref Range Status   06/19/2025 82.0 79.0 - 97.0 fL Final     MCH   Date Value Ref Range Status   06/19/2025 25.3 (L) 26.6 - 33.0 pg Final     MCHC   Date Value Ref Range Status   06/19/2025 30.8 (L) 31.5 - 35.7 g/dL Final     RDW   Date Value Ref Range Status   06/19/2025 14.4 12.3 - 15.4 % Final     RDW-SD   Date Value Ref Range Status   06/19/2025 41.2 37.0 - 54.0 fl Final     MPV   Date Value Ref Range Status   06/19/2025 10.7 6.0 - 12.0 fL Final     Platelets   Date  Value Ref Range Status   06/19/2025 285 140 - 450 10*3/mm3 Final     Neutrophil %   Date Value Ref Range Status   06/19/2025 64.6 42.7 - 76.0 % Final     Lymphocyte %   Date Value Ref Range Status   06/19/2025 25.0 19.6 - 45.3 % Final     Monocyte %   Date Value Ref Range Status   06/19/2025 8.3 5.0 - 12.0 % Final     Eosinophil %   Date Value Ref Range Status   06/19/2025 1.6 0.3 - 6.2 % Final     Basophil %   Date Value Ref Range Status   06/19/2025 0.5 0.0 - 1.5 % Final     Neutrophils, Absolute   Date Value Ref Range Status   06/19/2025 5.22 1.70 - 7.00 10*3/mm3 Final     Lymphocytes, Absolute   Date Value Ref Range Status   06/19/2025 2.02 0.70 - 3.10 10*3/mm3 Final     Monocytes, Absolute   Date Value Ref Range Status   06/19/2025 0.67 0.10 - 0.90 10*3/mm3 Final     Eosinophils, Absolute   Date Value Ref Range Status   06/19/2025 0.13 0.00 - 0.40 10*3/mm3 Final     Basophils, Absolute   Date Value Ref Range Status   06/19/2025 0.04 0.00 - 0.20 10*3/mm3 Final       Lab Results   Component Value Date    GLUCOSE 116 (H) 06/19/2025    BUN 12.7 06/19/2025    CREATININE 1.76 (H) 06/19/2025    BCR 7.2 06/19/2025    K 4.3 06/19/2025    CO2 25.6 06/19/2025    CALCIUM 9.0 06/19/2025    ALBUMIN 4.1 06/19/2025    AST 19 06/19/2025    ALT 18 06/19/2025         Assessment & Plan     Renal cell carcinoma of left kidney  - CBC & Differential  - Comprehensive Metabolic Panel      ASSESSMENT:    Cancer of sigmoid  - CBC & Differential        Invasive moderately differentiated sigmoid: Adenocarcinoma.  MMR intact  Status post sigmoidectomy pathology stage is pT3 pN0 M0: No high risk features.  Surveillance colonoscopy in 1 year with Dr. Peoples  Status post left nephrectomy clear-cell renal cell carcinoma pT3a pNX M0 grade 3.  For  Adjuvant Keytruda  He is currently on adjuvant Keytruda  Family history of multiple colonic polyps, cancers worrisome for hereditary cancer syndrome.  Patient will benefit from genetic  testing  Ascending aortic aneurysm 4.1 cm: Will need to be referred to vascular after surgical management of his malignancy.  Patient was given referral to vascular.  He has had his evaluation  Asymptomatic cholelithiasis: Patient notified  2 to  3 mm left lower lobe nodule, right calcified upper lobe granuloma  CKD creatinine 1.7-1.8              Plans:     Continue Keytruda  Reviewed potential side effects complications.  Discussed that he could lead to aortitis though this is rare complication.  He will continue to follow-up with his vascular surgeon.  Will plan to obtain another CT scan in August 2025.    Patient will call for problems at any point in time   CMP today  Follow-up 4 weeks or earlier as needed    All questions answered                 Electronically signed by Krissy March MD, 06/19/25, 6:16 PM EDT. .

## 2025-06-19 ENCOUNTER — LAB (OUTPATIENT)
Dept: LAB | Facility: HOSPITAL | Age: 55
End: 2025-06-19
Payer: COMMERCIAL

## 2025-06-19 ENCOUNTER — OFFICE VISIT (OUTPATIENT)
Dept: ONCOLOGY | Facility: CLINIC | Age: 55
End: 2025-06-19
Payer: COMMERCIAL

## 2025-06-19 VITALS
HEART RATE: 75 BPM | DIASTOLIC BLOOD PRESSURE: 85 MMHG | TEMPERATURE: 99.1 F | BODY MASS INDEX: 34.36 KG/M2 | HEIGHT: 70 IN | SYSTOLIC BLOOD PRESSURE: 113 MMHG | WEIGHT: 240 LBS | OXYGEN SATURATION: 99 % | RESPIRATION RATE: 18 BRPM

## 2025-06-19 DIAGNOSIS — C64.2 RENAL CELL CARCINOMA OF LEFT KIDNEY: ICD-10-CM

## 2025-06-19 DIAGNOSIS — C64.2 RENAL CELL ADENOCARCINOMA, LEFT: Primary | ICD-10-CM

## 2025-06-19 DIAGNOSIS — C64.2 RENAL CELL CARCINOMA OF LEFT KIDNEY: Primary | ICD-10-CM

## 2025-06-19 LAB
ALBUMIN SERPL-MCNC: 4.1 G/DL (ref 3.5–5.2)
ALBUMIN/GLOB SERPL: 2 G/DL
ALP SERPL-CCNC: 77 U/L (ref 39–117)
ALT SERPL W P-5'-P-CCNC: 18 U/L (ref 1–41)
ANION GAP SERPL CALCULATED.3IONS-SCNC: 8.4 MMOL/L (ref 5–15)
AST SERPL-CCNC: 19 U/L (ref 1–40)
BASOPHILS # BLD AUTO: 0.04 10*3/MM3 (ref 0–0.2)
BASOPHILS NFR BLD AUTO: 0.5 % (ref 0–1.5)
BILIRUB SERPL-MCNC: 0.6 MG/DL (ref 0–1.2)
BUN SERPL-MCNC: 12.7 MG/DL (ref 6–20)
BUN/CREAT SERPL: 7.2 (ref 7–25)
CALCIUM SPEC-SCNC: 9 MG/DL (ref 8.6–10.5)
CHLORIDE SERPL-SCNC: 105 MMOL/L (ref 98–107)
CO2 SERPL-SCNC: 25.6 MMOL/L (ref 22–29)
CREAT SERPL-MCNC: 1.76 MG/DL (ref 0.76–1.27)
DEPRECATED RDW RBC AUTO: 41.2 FL (ref 37–54)
EGFRCR SERPLBLD CKD-EPI 2021: 45.1 ML/MIN/1.73
EOSINOPHIL # BLD AUTO: 0.13 10*3/MM3 (ref 0–0.4)
EOSINOPHIL NFR BLD AUTO: 1.6 % (ref 0.3–6.2)
ERYTHROCYTE [DISTWIDTH] IN BLOOD BY AUTOMATED COUNT: 14.4 % (ref 12.3–15.4)
GLOBULIN UR ELPH-MCNC: 2.1 GM/DL
GLUCOSE SERPL-MCNC: 116 MG/DL (ref 65–99)
HCT VFR BLD AUTO: 40.6 % (ref 37.5–51)
HGB BLD-MCNC: 12.5 G/DL (ref 13–17.7)
HOLD SPECIMEN: NORMAL
HOLD SPECIMEN: NORMAL
LYMPHOCYTES # BLD AUTO: 2.02 10*3/MM3 (ref 0.7–3.1)
LYMPHOCYTES NFR BLD AUTO: 25 % (ref 19.6–45.3)
MCH RBC QN AUTO: 25.3 PG (ref 26.6–33)
MCHC RBC AUTO-ENTMCNC: 30.8 G/DL (ref 31.5–35.7)
MCV RBC AUTO: 82 FL (ref 79–97)
MONOCYTES # BLD AUTO: 0.67 10*3/MM3 (ref 0.1–0.9)
MONOCYTES NFR BLD AUTO: 8.3 % (ref 5–12)
NEUTROPHILS NFR BLD AUTO: 5.22 10*3/MM3 (ref 1.7–7)
NEUTROPHILS NFR BLD AUTO: 64.6 % (ref 42.7–76)
PLATELET # BLD AUTO: 285 10*3/MM3 (ref 140–450)
PMV BLD AUTO: 10.7 FL (ref 6–12)
POTASSIUM SERPL-SCNC: 4.3 MMOL/L (ref 3.5–5.2)
PROT SERPL-MCNC: 6.2 G/DL (ref 6–8.5)
RBC # BLD AUTO: 4.95 10*6/MM3 (ref 4.14–5.8)
SODIUM SERPL-SCNC: 139 MMOL/L (ref 136–145)
WBC NRBC COR # BLD AUTO: 8.08 10*3/MM3 (ref 3.4–10.8)

## 2025-06-19 PROCEDURE — 80053 COMPREHEN METABOLIC PANEL: CPT | Performed by: INTERNAL MEDICINE

## 2025-06-19 PROCEDURE — 36415 COLL VENOUS BLD VENIPUNCTURE: CPT

## 2025-06-19 PROCEDURE — 85025 COMPLETE CBC W/AUTO DIFF WBC: CPT

## 2025-06-25 DIAGNOSIS — I77.810 ASCENDING AORTA DILATATION: Primary | ICD-10-CM

## 2025-07-02 LAB
LAB AP CASE REPORT: NORMAL
LAB AP DIAGNOSIS COMMENT: NORMAL
Lab: NORMAL
Lab: NORMAL
PATH REPORT.ADDENDUM SPEC: NORMAL
PATH REPORT.FINAL DX SPEC: NORMAL
PATH REPORT.GROSS SPEC: NORMAL

## 2025-07-11 ENCOUNTER — HOSPITAL ENCOUNTER (OUTPATIENT)
Dept: ONCOLOGY | Facility: HOSPITAL | Age: 55
Discharge: HOME | End: 2025-07-11
Payer: COMMERCIAL

## 2025-07-11 VITALS
BODY MASS INDEX: 35.05 KG/M2 | SYSTOLIC BLOOD PRESSURE: 159 MMHG | RESPIRATION RATE: 16 BRPM | TEMPERATURE: 98.1 F | HEART RATE: 83 BPM | WEIGHT: 244.3 LBS | DIASTOLIC BLOOD PRESSURE: 89 MMHG

## 2025-07-11 DIAGNOSIS — C64.2 RENAL CELL CARCINOMA OF LEFT KIDNEY: Primary | ICD-10-CM

## 2025-07-11 LAB
ALP BLD-CCNC: 76 U/L (ref 53–128)
BASOPHILS # BLD AUTO: 0.02 10*3/MM3 (ref 0–0.2)
BASOPHILS NFR BLD AUTO: 0.3 % (ref 0–1.5)
BUN BLDA-MCNC: 17 MG/DL (ref 7–22)
CALCIUM BLD QL: 8.3 MG/DL (ref 8–10.3)
CHLORIDE BLDA-SCNC: 111 MMOL/L (ref 98–108)
CO2 BLDA-SCNC: 27 MMOL/L (ref 18–33)
CREAT BLDA-MCNC: 1.6 MG/DL (ref 0.6–1.2)
DEPRECATED RDW RBC AUTO: 42.9 FL (ref 37–54)
EGFRCR SERPLBLD CKD-EPI 2021: 50.6 ML/MIN/1.73
EOSINOPHIL # BLD AUTO: 0.15 10*3/MM3 (ref 0–0.4)
EOSINOPHIL NFR BLD AUTO: 2 % (ref 0.3–6.2)
ERYTHROCYTE [DISTWIDTH] IN BLOOD BY AUTOMATED COUNT: 14.7 % (ref 12.3–15.4)
GLUCOSE BLDC GLUCOMTR-MCNC: 143 MG/DL (ref 73–118)
HCT VFR BLD AUTO: 37.6 % (ref 37.5–51)
HGB BLD-MCNC: 11.8 G/DL (ref 13–17.7)
IMM GRANULOCYTES # BLD AUTO: ABNORMAL 10*3/UL
IMM GRANULOCYTES NFR BLD AUTO: ABNORMAL %
LYMPHOCYTES # BLD AUTO: 2.1 10*3/MM3 (ref 0.7–3.1)
LYMPHOCYTES NFR BLD AUTO: 27.7 % (ref 19.6–45.3)
MCH RBC QN AUTO: 25.4 PG (ref 26.6–33)
MCHC RBC AUTO-ENTMCNC: 31.4 G/DL (ref 31.5–35.7)
MCV RBC AUTO: 81 FL (ref 79–97)
MONOCYTES # BLD AUTO: 0.72 10*3/MM3 (ref 0.1–0.9)
MONOCYTES NFR BLD AUTO: 9.5 % (ref 5–12)
NEUTROPHILS NFR BLD AUTO: 4.6 10*3/MM3 (ref 1.7–7)
NEUTROPHILS NFR BLD AUTO: 60.5 % (ref 42.7–76)
PLATELET # BLD AUTO: 217 10*3/MM3 (ref 140–450)
PMV BLD AUTO: 11.3 FL (ref 6–12)
POC ALBUMIN: 3.4 G/L (ref 3.3–5.5)
POC ALT (SGPT): 18 U/L (ref 10–47)
POC AST (SGOT): 25 U/L (ref 11–38)
POC TOTAL BILIRUBIN: 0.6 MG/DL (ref 0.2–1.6)
POC TOTAL PROTEIN: 5.9 G/DL (ref 6.4–8.1)
POTASSIUM BLDA-SCNC: 4 MMOL/L (ref 3.6–5.1)
RBC # BLD AUTO: 4.64 10*6/MM3 (ref 4.14–5.8)
SODIUM BLD-SCNC: 139 MMOL/L (ref 128–145)
T4 FREE SERPL-MCNC: 1.05 NG/DL (ref 0.92–1.68)
TSH SERPL DL<=0.05 MIU/L-ACNC: 1.31 UIU/ML (ref 0.27–4.2)
WBC NRBC COR # BLD AUTO: 7.59 10*3/MM3 (ref 3.4–10.8)

## 2025-07-11 PROCEDURE — 80050 GENERAL HEALTH PANEL: CPT

## 2025-07-11 PROCEDURE — 96413 CHEMO IV INFUSION 1 HR: CPT

## 2025-07-11 PROCEDURE — 84439 ASSAY OF FREE THYROXINE: CPT | Performed by: INTERNAL MEDICINE

## 2025-07-11 RX ORDER — SODIUM CHLORIDE 9 MG/ML
20 INJECTION, SOLUTION INTRAVENOUS ONCE
Status: DISCONTINUED | OUTPATIENT
Start: 2025-07-11 | End: 2025-07-12 | Stop reason: HOSPADM

## 2025-07-11 RX ORDER — SODIUM CHLORIDE 9 MG/ML
20 INJECTION, SOLUTION INTRAVENOUS ONCE
Status: CANCELLED | OUTPATIENT
Start: 2025-07-11

## 2025-07-11 RX ADMIN — SODIUM CHLORIDE 200 MG: 9 INJECTION, SOLUTION INTRAVENOUS at 15:26

## 2025-07-16 ENCOUNTER — LAB (OUTPATIENT)
Dept: LAB | Facility: HOSPITAL | Age: 55
End: 2025-07-16
Payer: COMMERCIAL

## 2025-07-16 ENCOUNTER — OFFICE VISIT (OUTPATIENT)
Dept: ONCOLOGY | Facility: CLINIC | Age: 55
End: 2025-07-16
Payer: COMMERCIAL

## 2025-07-16 VITALS
RESPIRATION RATE: 20 BRPM | BODY MASS INDEX: 34.36 KG/M2 | OXYGEN SATURATION: 99 % | DIASTOLIC BLOOD PRESSURE: 84 MMHG | HEIGHT: 70 IN | SYSTOLIC BLOOD PRESSURE: 122 MMHG | WEIGHT: 240 LBS | HEART RATE: 64 BPM | TEMPERATURE: 98.2 F

## 2025-07-16 DIAGNOSIS — C64.2 RENAL CELL CARCINOMA OF LEFT KIDNEY: ICD-10-CM

## 2025-07-16 DIAGNOSIS — C64.2 RENAL CELL CARCINOMA OF LEFT KIDNEY: Primary | ICD-10-CM

## 2025-07-16 DIAGNOSIS — C18.7 CANCER OF SIGMOID: ICD-10-CM

## 2025-07-16 LAB
BASOPHILS # BLD AUTO: 0.03 10*3/MM3 (ref 0–0.2)
BASOPHILS NFR BLD AUTO: 0.4 % (ref 0–1.5)
DEPRECATED RDW RBC AUTO: 42.2 FL (ref 37–54)
EOSINOPHIL # BLD AUTO: 0.14 10*3/MM3 (ref 0–0.4)
EOSINOPHIL NFR BLD AUTO: 1.7 % (ref 0.3–6.2)
ERYTHROCYTE [DISTWIDTH] IN BLOOD BY AUTOMATED COUNT: 14.5 % (ref 12.3–15.4)
HCT VFR BLD AUTO: 37.8 % (ref 37.5–51)
HGB BLD-MCNC: 11.9 G/DL (ref 13–17.7)
IMM GRANULOCYTES # BLD AUTO: 0.01 10*3/MM3 (ref 0–0.05)
IMM GRANULOCYTES NFR BLD AUTO: 0.1 % (ref 0–0.5)
LYMPHOCYTES # BLD AUTO: 1.9 10*3/MM3 (ref 0.7–3.1)
LYMPHOCYTES NFR BLD AUTO: 23.7 % (ref 19.6–45.3)
MCH RBC QN AUTO: 25.1 PG (ref 26.6–33)
MCHC RBC AUTO-ENTMCNC: 31.5 G/DL (ref 31.5–35.7)
MCV RBC AUTO: 79.7 FL (ref 79–97)
MONOCYTES # BLD AUTO: 0.71 10*3/MM3 (ref 0.1–0.9)
MONOCYTES NFR BLD AUTO: 8.8 % (ref 5–12)
NEUTROPHILS NFR BLD AUTO: 5.24 10*3/MM3 (ref 1.7–7)
NEUTROPHILS NFR BLD AUTO: 65.3 % (ref 42.7–76)
PLATELET # BLD AUTO: 233 10*3/MM3 (ref 140–450)
PMV BLD AUTO: 10.6 FL (ref 6–12)
RBC # BLD AUTO: 4.74 10*6/MM3 (ref 4.14–5.8)
WBC NRBC COR # BLD AUTO: 8.03 10*3/MM3 (ref 3.4–10.8)

## 2025-07-16 PROCEDURE — 85025 COMPLETE CBC W/AUTO DIFF WBC: CPT

## 2025-07-16 PROCEDURE — 36415 COLL VENOUS BLD VENIPUNCTURE: CPT

## 2025-07-16 NOTE — PROGRESS NOTES
Hematology/Oncology Outpatient Follow Up    PATIENT NAME:Vance Pearl Jr.  :1970  MRN: [id number]  PRIMARY CARE PHYSICIAN: Leann Duke APRN  REFERRING PHYSICIAN: Leann Duke APRN    Chief Complaint   Patient presents with    Follow-up     Cancer of sigmoid          HISTORY OF PRESENT ILLNESS:     54-year-old male who had a routine screening colonoscopy was found to have sigmoidal mass on 3/10/2025.  Review of the colonoscopy suggest I had a 3 cm polyp in the rectum, in mass the rest of the polyps were tubular adenoma and inflammatory polyp.There is partially obstructing infiltrative highly suspicious malignant mass.  The mass extended for over 4 cm.  Multiple biopsies with taken.  This mass was noted to be at 20 cm from anal verge based on proctoscopy findings.  Close additional polyp measuring up to 3 cm which was removed, transverse colon also had a 1 cm pedunculated polyp which was also removed     Pathology revealed invasive poorly differentiated adenocarcinoma  MMR testing showed intact MMR     Patient subsequently had a CEA done which was 2.23     CT scan of the chest, abdomen and pelvis for restaging basically showed a mild aneurysmal dilatation of the ascending aorta measuring 4.1 cm no evidence of metastatic disease in the chest.  The abdomen there was no evidence of liver lesions.  He has gallstones.  At the upper pole of the left kidney there is a mass measuring 7.3 cm x 5.8 cm worrisome for renal cell carcinoma.  There was no definite extension into the left main renal vein.  No suspicious mass on the right kidney .        Patient has been referred for further evaluation and management of the above findings.           Patient is   He has 2 children  Does not smoke  He does not drink alcohol  He is a  of a [employer]     Family history significant for daughter with brain tumor at the age of 90s oligodendroglioma  Sister had meningioma at the age of  54  His mother had precancerous polyps maternal grandmother also has precancerous colonic polyps  2 maternal aunts with lung cancer     He is accompanied today by his spouse for this appointment.    4/16/2025 patient underwent left nephrectomy, pathology revealed clear-cell renal cell carcinoma measuring 6.5 cm completely excised.  It was grade 3 tumor extended into the renal sinus there was no sarcomatoid or rhabdoid features identified all margins are negative for invasive cancer.  Pathology stage is pT3a pNX M0.  Patient underwent sigmoidectomy final pathology revealed a 5.3 cm mass grade 2.  Tumor invades through muscularis propria into the pericolonic or perirectal tissue no evidence of lymphovascular invasion, perineural invasion no known treatment effect.  All margins are negative for invasive cancer distance to the closest margin was 7 cm all regional lymph nodes negative for malignancy total of 14 nodes were removed pathology stage is pT3 pN0 M0  5/16/2025: Patient was initiated on cycle 1 of Keytruda  6/13/2025: Patient was initiated on cycle 2 Keytruda  7/11/2025: Patient received cycle 3 of Keytruda  Past Medical History:   Diagnosis Date    Adenocarcinoma of sigmoid colon 03/10/2025    Colon cancer 4/2025    Colon cancer screening 10/26/2024    Cologuard positive    GERD (gastroesophageal reflux disease)     Hypertension 2018    no longer taking BP meds since changing his diet/ weight loss    Left kidney mass 03/13/2025       Past Surgical History:   Procedure Laterality Date    BICEPS TENDON REPAIR      COLON RESECTION N/A 04/16/2025    Procedure: COLON RESECTION LOW ANTERIOR LAPAROSCOPIC WITH DAVINCI ROBOT;  Surgeon: Arturo Peoples MD;  Location: [facility] MAIN OR;  Service: Robotics - DaVinci;  Laterality: N/A;    COLONOSCOPY N/A 03/10/2025    Procedure: COLONOSCOPY with POLYPECTOMY, SCLEROTHERAPY with INK TATOO, & BIOPSY;  Surgeon: Arturo Peoples MD;  Location: [facility] ENDOSCOPY;  Service: General;   Laterality: N/A;  POST: DIVERTICULOSIS, COLON POLYPS, COLON MASS    CYSTOSCOPY N/A 04/16/2025    Procedure: CYSTOSCOPY, ICG INJECTION INTO URETERS, CARLSON INSERTION;  Surgeon: Arturo Peoples MD;  Location: [facility] MAIN OR;  Service: Robotics - DaVinci;  Laterality: N/A;    LYMPH NODE BIOPSY  4/2025    Same time as colon and kidney removal surgery.    NEPHRECTOMY Left 04/16/2025    Procedure: RADICAL NEPHRECTOMY LAPAROSCOPIC WITH DAVINCI ROBOT;  Surgeon: Leno Zaidi MD;  Location: [facility] MAIN OR;  Service: Robotics - DaVinci;  Laterality: Left;    SIGMOIDOSCOPY N/A 04/16/2025    Procedure: SIGMOIDOSCOPY FLEXIBLE;  Surgeon: Arturo Peoples MD;  Location: [facility] MAIN OR;  Service: General;  Laterality: N/A;       No current outpatient medications on file.    No Known Allergies    Family History   Problem Relation Age of Onset    Basal cell carcinoma Mother 72        Rectal    No Known Problems Sister     Uterine cancer Maternal Grandmother 38    Liver cancer Maternal Grandmother 76    No Known Problems Half-Brother     No Known Problems Half-Sister     No Known Problems Half-Sister     Oligodendroglioma Daughter 9    Lung cancer Maternal Aunt 64    Lung cancer Maternal Aunt 59    Lymphoma Maternal Aunt 59    Lung cancer Paternal Aunt 66    Breast cancer Paternal Aunt 75    Lung cancer Paternal Aunt 62       Cancer-related family history includes Breast cancer (age of onset: 75) in his paternal aunt; Liver cancer (age of onset: 76) in his maternal grandmother; Lung cancer (age of onset: 59) in his maternal aunt; Lung cancer (age of onset: 62) in his paternal aunt; Lung cancer (age of onset: 64) in his maternal aunt; Lung cancer (age of onset: 66) in his paternal aunt; Lymphoma (age of onset: 59) in his maternal aunt; Oligodendroglioma (age of onset: 9) in his daughter; Uterine cancer (age of onset: 38) in his maternal grandmother.    Social History     Tobacco Use    Smoking status: Never     Passive exposure: Never     "Smokeless tobacco: Never   Vaping Use    Vaping status: Never Used   Substance Use Topics    Alcohol use: Never    Drug use: Never     I have reviewed and confirmed the accuracy of the patient's history: Chief complaint, HPI, ROS, and Subjective as entered by the MA/LPN/RN. Krissy March MD 07/16/25       SUBJECTIVE:     Overall he is tolerating Keytruda.  He has concerns regarding inflammation and what this could mean for his aortic aneurysm.  He is established with vascular      Denies any new issues.  He is here today for follow-up.    REVIEW OF SYSTEMS:    Review of Systems   Constitutional:  Negative for chills and fever.   HENT:  Negative for ear pain, mouth sores, nosebleeds and sore throat.    Eyes:  Negative for photophobia and visual disturbance.   Respiratory:  Negative for wheezing and stridor.    Cardiovascular:  Negative for chest pain and palpitations.   Gastrointestinal:  Negative for abdominal pain, diarrhea, nausea and vomiting.   Endocrine: Negative for cold intolerance and heat intolerance.   Genitourinary:  Negative for dysuria and hematuria.   Musculoskeletal:  Negative for joint swelling and neck stiffness.   Skin:  Negative for color change and rash.   Neurological:  Negative for seizures and syncope.   Hematological:  Negative for adenopathy.        No obvious bleeding   Psychiatric/Behavioral:  Negative for agitation, confusion and hallucinations.        OBJECTIVE:    Vitals:    07/16/25 1559   BP: 122/84   Pulse: 64   Resp: 20   Temp: 98.2 °F (36.8 °C)   TempSrc: Temporal   SpO2: 99%   Weight: 109 kg (240 lb)   Height: 177.8 cm (70\")   PainSc: 0-No pain       Body mass index is 34.44 kg/m².    ECOG  (0) Fully active, able to carry on all predisease performance without restriction    Physical Exam  Vitals and nursing note reviewed.   Constitutional:       General: He is not in acute distress.     Appearance: He is not diaphoretic.   HENT:      Head: Normocephalic and atraumatic. "   Eyes:      General: No scleral icterus.        Right eye: No discharge.         Left eye: No discharge.      Conjunctiva/sclera: Conjunctivae normal.   Neck:      Thyroid: No thyromegaly.   Cardiovascular:      Rate and Rhythm: Normal rate and regular rhythm.      Heart sounds: Normal heart sounds.      No friction rub. No gallop.   Pulmonary:      Effort: Pulmonary effort is normal. No respiratory distress.      Breath sounds: No stridor. No wheezing.   Abdominal:      General: Bowel sounds are normal.      Palpations: Abdomen is soft. There is no mass.      Tenderness: There is no abdominal tenderness. There is no guarding or rebound.   Musculoskeletal:         General: No tenderness. Normal range of motion.      Cervical back: Normal range of motion and neck supple.   Lymphadenopathy:      Cervical: No cervical adenopathy.   Skin:     General: Skin is warm.      Findings: No erythema or rash.   Neurological:      Mental Status: He is alert and oriented to person, place, and time.      Motor: No abnormal muscle tone.   Psychiatric:         Behavior: Behavior normal.       I have reexamined the patient and the results are consistent with the previously documented exam. Krissy Merna March MD        RECENT LABS  WBC   Date Value Ref Range Status   07/16/2025 8.03 3.40 - 10.80 10*3/mm3 Final     RBC   Date Value Ref Range Status   07/16/2025 4.74 4.14 - 5.80 10*6/mm3 Final     Hemoglobin   Date Value Ref Range Status   07/16/2025 11.9 (L) 13.0 - 17.7 g/dL Final   05/26/2023 15.9 13.0 - 17.7 g/dL Final   10/11/2022 15.4 13.0 - 17.7 g/dL Final     Hematocrit   Date Value Ref Range Status   07/16/2025 37.8 37.5 - 51.0 % Final   05/26/2023 48.2 37.5 - 51.0 % Final     MCV   Date Value Ref Range Status   07/16/2025 79.7 79.0 - 97.0 fL Final     MCH   Date Value Ref Range Status   07/16/2025 25.1 (L) 26.6 - 33.0 pg Final     MCHC   Date Value Ref Range Status   07/16/2025 31.5 31.5 - 35.7 g/dL Final     RDW   Date  Value Ref Range Status   07/16/2025 14.5 12.3 - 15.4 % Final     RDW-SD   Date Value Ref Range Status   07/16/2025 42.2 37.0 - 54.0 fl Final     MPV   Date Value Ref Range Status   07/16/2025 10.6 6.0 - 12.0 fL Final     Platelets   Date Value Ref Range Status   07/16/2025 233 140 - 450 10*3/mm3 Final     Neutrophil %   Date Value Ref Range Status   07/16/2025 65.3 42.7 - 76.0 % Final     Lymphocyte %   Date Value Ref Range Status   07/16/2025 23.7 19.6 - 45.3 % Final     Monocyte %   Date Value Ref Range Status   07/16/2025 8.8 5.0 - 12.0 % Final     Eosinophil %   Date Value Ref Range Status   07/16/2025 1.7 0.3 - 6.2 % Final     Basophil %   Date Value Ref Range Status   07/16/2025 0.4 0.0 - 1.5 % Final     Immature Grans %   Date Value Ref Range Status   07/16/2025 0.1 0.0 - 0.5 % Final     Neutrophils, Absolute   Date Value Ref Range Status   07/16/2025 5.24 1.70 - 7.00 10*3/mm3 Final     Lymphocytes, Absolute   Date Value Ref Range Status   07/16/2025 1.90 0.70 - 3.10 10*3/mm3 Final     Monocytes, Absolute   Date Value Ref Range Status   07/16/2025 0.71 0.10 - 0.90 10*3/mm3 Final     Eosinophils, Absolute   Date Value Ref Range Status   07/16/2025 0.14 0.00 - 0.40 10*3/mm3 Final     Basophils, Absolute   Date Value Ref Range Status   07/16/2025 0.03 0.00 - 0.20 10*3/mm3 Final     Immature Grans, Absolute   Date Value Ref Range Status   07/16/2025 0.01 0.00 - 0.05 10*3/mm3 Final       Lab Results   Component Value Date    GLUCOSE 116 (H) 06/19/2025    BUN 12.7 06/19/2025    CREATININE 1.60 (H) 07/11/2025    BCR 7.2 06/19/2025    K 4.3 06/19/2025    CO2 25.6 06/19/2025    CALCIUM 9.0 06/19/2025    ALBUMIN 4.1 06/19/2025    AST 19 06/19/2025    ALT 18 06/19/2025         Assessment & Plan     Renal cell carcinoma of left kidney  - CBC & Differential    Cancer of sigmoid  - CT chest wo contrast  - CT abdomen pelvis wo contrast        ASSESSMENT:    Cancer of sigmoid       Invasive moderately differentiated sigmoid:  Adenocarcinoma.  MMR intact. Ct DNA  was negative.  Status post sigmoidectomy pathology stage is pT3 pN0 M0: No high risk features.  Surveillance colonoscopy in 1 year with Dr. Peoples.  Status post left nephrectomy clear-cell renal cell carcinoma pT3a pNX M0 grade 3.  For  Adjuvant Keytruda  He is currently on adjuvant Keytruda. Will continue the same.  Family history of multiple colonic polyps, cancers worrisome for hereditary cancer syndrome.  Patient will benefit from genetic testing  Ascending aortic aneurysm 4.1 cm: Will need to be referred to vascular after surgical management of his malignancy.  Patient was given referral to vascular.  He has had his evaluation  Asymptomatic cholelithiasis: Patient notified  2 to  3 mm left lower lobe nodule, right calcified upper lobe granuloma  CKD creatinine 1.7-1.8: He has a referral coming up for nephrology.              Plans:     Continue  Keytruda  Follow up in 4 weeks. Continue Keytruda. Schedule CT scans of the Chest/abdomen and pelvis without contrast for cancer restaging.   Reviewed potential side effects complications.  Discussed that he could lead to aortitis though this is rare complication.  He will continue to follow-up with his vascular surgeon.  Will plan to obtain another CT scan in August 2025.  These were ordered  Patient will call for problems at any point in time   All questions answered.             Electronically signed by Krissy March MD, 07/16/25, 5:43 PM EDT.

## 2025-07-24 ENCOUNTER — HOSPITAL ENCOUNTER (OUTPATIENT)
Dept: PET IMAGING | Facility: HOSPITAL | Age: 55
Discharge: HOME | End: 2025-07-24
Admitting: INTERNAL MEDICINE
Payer: COMMERCIAL

## 2025-07-24 DIAGNOSIS — C18.7 CANCER OF SIGMOID: ICD-10-CM

## 2025-07-24 PROCEDURE — 74176 CT ABD & PELVIS W/O CONTRAST: CPT

## 2025-07-24 PROCEDURE — 71250 CT THORAX DX C-: CPT

## 2025-08-01 ENCOUNTER — HOSPITAL ENCOUNTER (OUTPATIENT)
Dept: ONCOLOGY | Facility: HOSPITAL | Age: 55
Discharge: HOME | End: 2025-08-01
Payer: COMMERCIAL

## 2025-08-01 VITALS
BODY MASS INDEX: 34.47 KG/M2 | DIASTOLIC BLOOD PRESSURE: 78 MMHG | SYSTOLIC BLOOD PRESSURE: 142 MMHG | WEIGHT: 240.8 LBS | HEIGHT: 70 IN | OXYGEN SATURATION: 98 % | HEART RATE: 73 BPM | TEMPERATURE: 97.2 F | RESPIRATION RATE: 18 BRPM

## 2025-08-01 DIAGNOSIS — C64.2 RENAL CELL CARCINOMA OF LEFT KIDNEY: Primary | ICD-10-CM

## 2025-08-01 DIAGNOSIS — Z90.5 S/P NEPHRECTOMY: Primary | ICD-10-CM

## 2025-08-01 LAB
ALP BLD-CCNC: 83 U/L (ref 53–128)
BASOPHILS # BLD AUTO: 0.02 10*3/MM3 (ref 0–0.2)
BASOPHILS NFR BLD AUTO: 0.3 % (ref 0–1.5)
BUN BLDA-MCNC: 14 MG/DL (ref 7–22)
CALCIUM BLD QL: 9.2 MG/DL (ref 8–10.3)
CHLORIDE BLDA-SCNC: 108 MMOL/L (ref 98–108)
CO2 BLDA-SCNC: 28 MMOL/L (ref 18–33)
CREAT BLDA-MCNC: 1.8 MG/DL (ref 0.6–1.2)
DEPRECATED RDW RBC AUTO: 43.1 FL (ref 37–54)
EGFRCR SERPLBLD CKD-EPI 2021: 43.9 ML/MIN/1.73
EOSINOPHIL # BLD AUTO: 0.13 10*3/MM3 (ref 0–0.4)
EOSINOPHIL NFR BLD AUTO: 1.6 % (ref 0.3–6.2)
ERYTHROCYTE [DISTWIDTH] IN BLOOD BY AUTOMATED COUNT: 14.8 % (ref 12.3–15.4)
GLUCOSE BLDC GLUCOMTR-MCNC: 132 MG/DL (ref 73–118)
HCT VFR BLD AUTO: 41 % (ref 37.5–51)
HGB BLD-MCNC: 12.8 G/DL (ref 13–17.7)
IMM GRANULOCYTES # BLD AUTO: 0 10*3/MM3 (ref 0–0.05)
IMM GRANULOCYTES NFR BLD AUTO: 0 % (ref 0–0.5)
LYMPHOCYTES # BLD AUTO: 1.89 10*3/MM3 (ref 0.7–3.1)
LYMPHOCYTES NFR BLD AUTO: 23.7 % (ref 19.6–45.3)
MCH RBC QN AUTO: 24.8 PG (ref 26.6–33)
MCHC RBC AUTO-ENTMCNC: 31.2 G/DL (ref 31.5–35.7)
MCV RBC AUTO: 79.5 FL (ref 79–97)
MONOCYTES # BLD AUTO: 0.5 10*3/MM3 (ref 0.1–0.9)
MONOCYTES NFR BLD AUTO: 6.3 % (ref 5–12)
NEUTROPHILS NFR BLD AUTO: 5.42 10*3/MM3 (ref 1.7–7)
NEUTROPHILS NFR BLD AUTO: 68.1 % (ref 42.7–76)
PLATELET # BLD AUTO: 261 10*3/MM3 (ref 140–450)
PMV BLD AUTO: 11 FL (ref 6–12)
POC ALBUMIN: 3.8 G/L (ref 3.3–5.5)
POC ALT (SGPT): 23 U/L (ref 10–47)
POC AST (SGOT): 27 U/L (ref 11–38)
POC TOTAL BILIRUBIN: 0.9 MG/DL (ref 0.2–1.6)
POC TOTAL PROTEIN: 6.5 G/DL (ref 6.4–8.1)
POTASSIUM BLDA-SCNC: 4.1 MMOL/L (ref 3.6–5.1)
RBC # BLD AUTO: 5.16 10*6/MM3 (ref 4.14–5.8)
SODIUM BLD-SCNC: 143 MMOL/L (ref 128–145)
WBC NRBC COR # BLD AUTO: 7.96 10*3/MM3 (ref 3.4–10.8)

## 2025-08-01 PROCEDURE — 96413 CHEMO IV INFUSION 1 HR: CPT

## 2025-08-01 PROCEDURE — 80053 COMPREHEN METABOLIC PANEL: CPT

## 2025-08-01 PROCEDURE — 85025 COMPLETE CBC W/AUTO DIFF WBC: CPT | Performed by: INTERNAL MEDICINE

## 2025-08-01 RX ORDER — SODIUM CHLORIDE 9 MG/ML
20 INJECTION, SOLUTION INTRAVENOUS ONCE
Status: CANCELLED | OUTPATIENT
Start: 2025-08-01

## 2025-08-01 RX ORDER — SODIUM CHLORIDE 9 MG/ML
20 INJECTION, SOLUTION INTRAVENOUS ONCE
Status: DISCONTINUED | OUTPATIENT
Start: 2025-08-01 | End: 2025-08-02 | Stop reason: HOSPADM

## 2025-08-01 RX ADMIN — SODIUM CHLORIDE 200 MG: 9 INJECTION, SOLUTION INTRAVENOUS at 15:18

## 2025-08-15 ENCOUNTER — LAB (OUTPATIENT)
Dept: LAB | Facility: HOSPITAL | Age: 55
End: 2025-08-15
Payer: COMMERCIAL

## 2025-08-15 ENCOUNTER — OFFICE VISIT (OUTPATIENT)
Dept: ONCOLOGY | Facility: CLINIC | Age: 55
End: 2025-08-15
Payer: COMMERCIAL

## 2025-08-15 VITALS
BODY MASS INDEX: 34.93 KG/M2 | HEART RATE: 66 BPM | WEIGHT: 244 LBS | TEMPERATURE: 98 F | OXYGEN SATURATION: 99 % | DIASTOLIC BLOOD PRESSURE: 98 MMHG | HEIGHT: 70 IN | SYSTOLIC BLOOD PRESSURE: 147 MMHG

## 2025-08-15 DIAGNOSIS — C64.2 RENAL CELL CARCINOMA OF LEFT KIDNEY: Primary | ICD-10-CM

## 2025-08-15 LAB
ALBUMIN SERPL-MCNC: 4.1 G/DL (ref 3.5–5.2)
ALBUMIN/GLOB SERPL: 2.1 G/DL
ALP SERPL-CCNC: 87 U/L (ref 39–117)
ALT SERPL W P-5'-P-CCNC: 22 U/L (ref 1–41)
ANION GAP SERPL CALCULATED.3IONS-SCNC: 10 MMOL/L (ref 5–15)
AST SERPL-CCNC: 19 U/L (ref 1–40)
BASOPHILS # BLD AUTO: 0.04 10*3/MM3 (ref 0–0.2)
BASOPHILS NFR BLD AUTO: 0.6 % (ref 0–1.5)
BILIRUB SERPL-MCNC: 0.4 MG/DL (ref 0–1.2)
BUN SERPL-MCNC: 14.6 MG/DL (ref 6–20)
BUN/CREAT SERPL: 8.1 (ref 7–25)
CALCIUM SPEC-SCNC: 8.8 MG/DL (ref 8.6–10.5)
CHLORIDE SERPL-SCNC: 105 MMOL/L (ref 98–107)
CO2 SERPL-SCNC: 27 MMOL/L (ref 22–29)
CREAT SERPL-MCNC: 1.81 MG/DL (ref 0.76–1.27)
DEPRECATED RDW RBC AUTO: 43.7 FL (ref 37–54)
EGFRCR SERPLBLD CKD-EPI 2021: 43.6 ML/MIN/1.73
EOSINOPHIL # BLD AUTO: 0.13 10*3/MM3 (ref 0–0.4)
EOSINOPHIL NFR BLD AUTO: 1.9 % (ref 0.3–6.2)
ERYTHROCYTE [DISTWIDTH] IN BLOOD BY AUTOMATED COUNT: 14.8 % (ref 12.3–15.4)
GLOBULIN UR ELPH-MCNC: 2 GM/DL
GLUCOSE SERPL-MCNC: 101 MG/DL (ref 65–99)
HCT VFR BLD AUTO: 40.5 % (ref 37.5–51)
HGB BLD-MCNC: 12.8 G/DL (ref 13–17.7)
HOLD SPECIMEN: NORMAL
HOLD SPECIMEN: NORMAL
IMM GRANULOCYTES # BLD AUTO: 0.01 10*3/MM3 (ref 0–0.05)
IMM GRANULOCYTES NFR BLD AUTO: 0.1 % (ref 0–0.5)
LYMPHOCYTES # BLD AUTO: 1.92 10*3/MM3 (ref 0.7–3.1)
LYMPHOCYTES NFR BLD AUTO: 28.5 % (ref 19.6–45.3)
MCH RBC QN AUTO: 25.4 PG (ref 26.6–33)
MCHC RBC AUTO-ENTMCNC: 31.6 G/DL (ref 31.5–35.7)
MCV RBC AUTO: 80.4 FL (ref 79–97)
MONOCYTES # BLD AUTO: 0.65 10*3/MM3 (ref 0.1–0.9)
MONOCYTES NFR BLD AUTO: 9.6 % (ref 5–12)
NEUTROPHILS NFR BLD AUTO: 3.99 10*3/MM3 (ref 1.7–7)
NEUTROPHILS NFR BLD AUTO: 59.3 % (ref 42.7–76)
PLATELET # BLD AUTO: 238 10*3/MM3 (ref 140–450)
PMV BLD AUTO: 10.6 FL (ref 6–12)
POTASSIUM SERPL-SCNC: 4.4 MMOL/L (ref 3.5–5.2)
PROT SERPL-MCNC: 6.1 G/DL (ref 6–8.5)
RBC # BLD AUTO: 5.04 10*6/MM3 (ref 4.14–5.8)
SODIUM SERPL-SCNC: 142 MMOL/L (ref 136–145)
T3FREE SERPL-MCNC: 3.44 PG/ML (ref 2–4.4)
T4 FREE SERPL-MCNC: 1.16 NG/DL (ref 0.92–1.68)
TSH SERPL DL<=0.05 MIU/L-ACNC: 1.8 UIU/ML (ref 0.27–4.2)
WBC NRBC COR # BLD AUTO: 6.74 10*3/MM3 (ref 3.4–10.8)

## 2025-08-15 PROCEDURE — 36415 COLL VENOUS BLD VENIPUNCTURE: CPT

## 2025-08-15 PROCEDURE — 84481 FREE ASSAY (FT-3): CPT | Performed by: INTERNAL MEDICINE

## 2025-08-15 PROCEDURE — 84439 ASSAY OF FREE THYROXINE: CPT | Performed by: INTERNAL MEDICINE

## 2025-08-15 PROCEDURE — 80050 GENERAL HEALTH PANEL: CPT

## 2025-08-22 ENCOUNTER — HOSPITAL ENCOUNTER (OUTPATIENT)
Dept: ONCOLOGY | Facility: HOSPITAL | Age: 55
Discharge: HOME | End: 2025-08-22
Admitting: INTERNAL MEDICINE
Payer: COMMERCIAL

## 2025-08-22 VITALS
HEART RATE: 71 BPM | OXYGEN SATURATION: 98 % | BODY MASS INDEX: 34.93 KG/M2 | SYSTOLIC BLOOD PRESSURE: 126 MMHG | TEMPERATURE: 97.5 F | HEIGHT: 70 IN | DIASTOLIC BLOOD PRESSURE: 82 MMHG | WEIGHT: 244 LBS | RESPIRATION RATE: 20 BRPM

## 2025-08-22 DIAGNOSIS — C64.2 RENAL CELL CARCINOMA OF LEFT KIDNEY: Primary | ICD-10-CM

## 2025-08-22 LAB
ALP BLD-CCNC: 72 U/L (ref 53–128)
BASOPHILS # BLD AUTO: 0.04 10*3/MM3 (ref 0–0.2)
BASOPHILS NFR BLD AUTO: 0.5 % (ref 0–1.5)
BUN BLDA-MCNC: 17 MG/DL (ref 7–22)
CALCIUM BLD QL: 8.6 MG/DL (ref 8–10.3)
CHLORIDE BLDA-SCNC: 107 MMOL/L (ref 98–108)
CO2 BLDA-SCNC: 27 MMOL/L (ref 18–33)
CREAT BLDA-MCNC: 1.7 MG/DL (ref 0.6–1.2)
DEPRECATED RDW RBC AUTO: 43.1 FL (ref 37–54)
EGFRCR SERPLBLD CKD-EPI 2021: 47 ML/MIN/1.73
EOSINOPHIL # BLD AUTO: 0.12 10*3/MM3 (ref 0–0.4)
EOSINOPHIL NFR BLD AUTO: 1.5 % (ref 0.3–6.2)
ERYTHROCYTE [DISTWIDTH] IN BLOOD BY AUTOMATED COUNT: 14.7 % (ref 12.3–15.4)
GLUCOSE BLDC GLUCOMTR-MCNC: 140 MG/DL (ref 73–118)
HCT VFR BLD AUTO: 41.5 % (ref 37.5–51)
HGB BLD-MCNC: 13.1 G/DL (ref 13–17.7)
IMM GRANULOCYTES # BLD AUTO: 0.01 10*3/MM3 (ref 0–0.05)
IMM GRANULOCYTES NFR BLD AUTO: 0.1 % (ref 0–0.5)
LYMPHOCYTES # BLD AUTO: 2.01 10*3/MM3 (ref 0.7–3.1)
LYMPHOCYTES NFR BLD AUTO: 24.6 % (ref 19.6–45.3)
MCH RBC QN AUTO: 25.3 PG (ref 26.6–33)
MCHC RBC AUTO-ENTMCNC: 31.6 G/DL (ref 31.5–35.7)
MCV RBC AUTO: 80.3 FL (ref 79–97)
MONOCYTES # BLD AUTO: 0.71 10*3/MM3 (ref 0.1–0.9)
MONOCYTES NFR BLD AUTO: 8.7 % (ref 5–12)
NEUTROPHILS NFR BLD AUTO: 5.29 10*3/MM3 (ref 1.7–7)
NEUTROPHILS NFR BLD AUTO: 64.6 % (ref 42.7–76)
PLATELET # BLD AUTO: 238 10*3/MM3 (ref 140–450)
PMV BLD AUTO: 11 FL (ref 6–12)
POC ALBUMIN: 3.6 G/L (ref 3.3–5.5)
POC ALT (SGPT): 25 U/L (ref 10–47)
POC AST (SGOT): 25 U/L (ref 11–38)
POC TOTAL BILIRUBIN: 0.6 MG/DL (ref 0.2–1.6)
POC TOTAL PROTEIN: 6.6 G/DL (ref 6.4–8.1)
POTASSIUM BLDA-SCNC: 4.2 MMOL/L (ref 3.6–5.1)
RBC # BLD AUTO: 5.17 10*6/MM3 (ref 4.14–5.8)
SODIUM BLD-SCNC: 144 MMOL/L (ref 128–145)
T4 FREE SERPL-MCNC: 1.04 NG/DL (ref 0.92–1.68)
TSH SERPL DL<=0.05 MIU/L-ACNC: 1.21 UIU/ML (ref 0.27–4.2)
WBC NRBC COR # BLD AUTO: 8.18 10*3/MM3 (ref 3.4–10.8)

## 2025-08-22 PROCEDURE — 80050 GENERAL HEALTH PANEL: CPT | Performed by: INTERNAL MEDICINE

## 2025-08-22 RX ORDER — SODIUM CHLORIDE 9 MG/ML
20 INJECTION, SOLUTION INTRAVENOUS ONCE
Status: CANCELLED | OUTPATIENT
Start: 2025-08-22

## 2025-08-22 RX ORDER — SODIUM CHLORIDE 9 MG/ML
20 INJECTION, SOLUTION INTRAVENOUS ONCE
Status: DISCONTINUED | OUTPATIENT
Start: 2025-08-22 | End: 2025-08-23 | Stop reason: HOSPADM

## 2025-08-22 RX ADMIN — SODIUM CHLORIDE 200 MG: 9 INJECTION, SOLUTION INTRAVENOUS at 15:05

## (undated) DEVICE — GOWN,SIRUS,POLYRNF,BRTHSLV,XL,30/CS: Brand: MEDLINE

## (undated) DEVICE — DRN WND EVAC BULB 100CC

## (undated) DEVICE — SOLUTION,WATER,IRRIGATION,1000ML,STERILE: Brand: MEDLINE

## (undated) DEVICE — SNAR POLYP HOTSNARE/BRAIDED OVL/MINI 7F 2.8X10MM 230CM 1P/U

## (undated) DEVICE — SUCTION IRRIGATOR: Brand: ENDOWRIST

## (undated) DEVICE — ELECTRD GLD WR CUP 10MM 72IN

## (undated) DEVICE — BLADELESS OBTURATOR: Brand: WECK VISTA

## (undated) DEVICE — APPL HEMOS FOR DELIVERY FLOSEAL

## (undated) DEVICE — TUBING, SUCTION, 1/4" X 12', STRAIGHT: Brand: MEDLINE

## (undated) DEVICE — SOL NACL 0.9PCT 1000ML

## (undated) DEVICE — PK CYSTO 50

## (undated) DEVICE — SPNG LAP PREWSH SFTPK 18X18IN STRL PK/5

## (undated) DEVICE — CANNULA SEAL

## (undated) DEVICE — 3M™ IOBAN™ 2 ANTIMICROBIAL INCISE DRAPE 6650EZ: Brand: IOBAN™ 2

## (undated) DEVICE — COLUMN DRAPE

## (undated) DEVICE — PENCL HND ROCKRSWTCH HOLSTR EZ CLEAN TP CRD 10FT

## (undated) DEVICE — GLOVE,SURG,SENSICARE SLT,LF,PF,7: Brand: MEDLINE

## (undated) DEVICE — 450 ML BOTTLE OF 0.05% CHLORHEXIDINE GLUCONATE IN 99.95% STERILE WATER FOR IRRIGATION, USP AND APPLICATOR.: Brand: IRRISEPT ANTIMICROBIAL WOUND LAVAGE

## (undated) DEVICE — ENDOPATH PNEUMONEEDLE INSUFFLATION NEEDLES WITH LUER LOCK CONNECTORS 120MM: Brand: ENDOPATH

## (undated) DEVICE — GENERAL LAPAROSCOPY CDS: Brand: MEDLINE INDUSTRIES, INC.

## (undated) DEVICE — ENDOPATH XCEL WITH OPTIVIEW TECHNOLOGY BLADELESS TROCARS WITH STABILITY SLEEVES: Brand: ENDOPATH XCEL OPTIVIEW

## (undated) DEVICE — SINGLE-USE BIOPSY FORCEPS: Brand: RADIAL JAW 4

## (undated) DEVICE — PASS SUT PRO BARIATRIC XL W/TROC SWABS

## (undated) DEVICE — GLOVE,SURG,SENSICARE SLT,LF,PF,6.5: Brand: MEDLINE

## (undated) DEVICE — Device: Brand: BLACK EYE

## (undated) DEVICE — SEALANT WND FIBRIN TISSEEL PREFIL/SYR/PRIMAFZ 4ML

## (undated) DEVICE — LAPAROSCOPIC ACCESS SYSTEM: Brand: ALEXIS LAPAROSCOPIC SYSTEM

## (undated) DEVICE — DRAPE,UTILITY,XL,4/PK,STERILE: Brand: MEDLINE

## (undated) DEVICE — LAPAROVUE VISIBILITY SYSTEM LAPAROSCOPIC SOLUTIONS: Brand: LAPAROVUE

## (undated) DEVICE — SEAL

## (undated) DEVICE — THE ECHELON FLEX POWERED PLUS ARTICULATING ENDOSCOPIC LINEAR CUTTERS ARE STERILE, SINGLE PATIENT USE INSTRUMENTS THAT SIMULTANEOUSLYCUT AND STAPLE TISSUE. THERE ARE SIX STAGGERED ROWS OF STAPLES, THREE ON EITHER SIDE OF THE CUT LINE. THE ECHELON FLEX 45 POWERED PLUSINSTRUMENTS HAVE A STAPLE LINE THAT IS APPROXIMATELY 45 MM LONG AND A CUT LINE THAT IS APPROXIMATELY 42 MM LONG. THE SHAFT CAN ROTATE FREELYIN BOTH DIRECTIONS AND AN ARTICULATION MECHANISM ENABLES THE DISTAL PORTION OF THE SHAFT TO PIVOT TO FACILITATE LATERAL ACCESS TO THE OPERATIVESITE.THE INSTRUMENTS ARE PACKAGED WITH A PRIMARY LITHIUM BATTERY PACK THAT MUST BE INSTALLED PRIOR TO USE. THERE ARE SPECIFIC REQUIREMENTS FORDISPOSING OF THE BATTERY PACK. REFER TO THE BATTERY PACK DISPOSAL SECTION.THE INSTRUMENTS ARE PACKAGED WITHOUT A RELOAD AND MUST BE LOADED PRIOR TO USE. A STAPLE RETAINING CAP ON THE RELOAD PROTECTS THE STAPLE LEGPOINTS DURING SHIPPING AND TRANSPORTATION. THE INSTRUMENTS’ LOCK-OUT FEATURE IS DESIGNED TO PREVENT A USED OR IMPROPERLY INSTALLED RELOADFROM BEING REFIRED OR AN INSTRUMENT FROM BEING FIRED WITHOUT A RELOAD.: Brand: ECHELON FLEX

## (undated) DEVICE — STAPLER 60: Brand: SUREFORM

## (undated) DEVICE — ANTIBACTERIAL UNDYED BRAIDED (POLYGLACTIN 910), SYNTHETIC ABSORBABLE SUTURE: Brand: COATED VICRYL

## (undated) DEVICE — TOWEL,OR,DSP,ST,WHITE,DLX,4/PK,20PK/CS: Brand: MEDLINE

## (undated) DEVICE — NITINOL WIRE WITH HYDROPHILIC TIP: Brand: SENSOR

## (undated) DEVICE — MICRO HVTSA, 0.5G AND HVTSA SOURCEMARK PRODUCT CODE M1206 AND M1206-01: Brand: EXOFIN MICRO HVTSA, 0.5G

## (undated) DEVICE — THE STERILE LIGHT HANDLE COVER IS USED WITH STERIS SURGICAL LIGHTING AND VISUALIZATION SYSTEMS.

## (undated) DEVICE — ST TBG AIRSEAL FLTR TRI LUM

## (undated) DEVICE — TRAP WIDEEYE POLYP

## (undated) DEVICE — COVER,MAYO STAND,STERILE: Brand: MEDLINE

## (undated) DEVICE — 1LYRTR 16FR10ML 100%SILI SNAP: Brand: MEDLINE INDUSTRIES, INC.

## (undated) DEVICE — PK ENDO GI 50

## (undated) DEVICE — CFF SCD HEMFRCE SEQ CLF STD XL

## (undated) DEVICE — ANTIBACTERIAL UNDYED BRAIDED (POLYGLACTIN 910), SYNTHETIC ABSORBABLE SURGICAL SUTURE: Brand: COATED VICRYL

## (undated) DEVICE — THE CARR-LOCKE INJECTION NEEDLE IS A SINGLE USE, DISPOSABLE, FLEXIBLE SHEATH INJECTION NEEDLE USED FOR THE INJECTION OF VARIOUS TYPES OF MEDIA THROUGH FLEXIBLE ENDOSCOPES.

## (undated) DEVICE — CANNULA: Brand: SINGLE-SITE

## (undated) DEVICE — 40580 - THE PINK PAD - ADVANCED TRENDELENBURG POSITIONING KIT: Brand: 40580 - THE PINK PAD - ADVANCED TRENDELENBURG POSITIONING KIT

## (undated) DEVICE — SUT MNCRYL PLS ANTIB UD 4/0 PS2 18IN

## (undated) DEVICE — SUREFIT, DUAL DISPERSIVE ELECTRODE, CONTACT QUALITY MONITOR: Brand: SUREFIT

## (undated) DEVICE — BLAKE SILICONE DRAIN, 19 FR ROUND, HUBLESS WITH 1/4" BENDABLE TROCAR: Brand: BLAKE

## (undated) DEVICE — THE STERILE CAMERA HANDLE COVER IS FOR USE WITH THE STERIS SURGICAL LIGHTING AND VISUALIZATION SYSTEMS.

## (undated) DEVICE — JELLY,LUBE,STERILE,FLIP TOP,TUBE,4-OZ: Brand: MEDLINE

## (undated) DEVICE — SOL IRR NACL 0.9PCT BO 1000ML

## (undated) DEVICE — YANKAUER,BULB TIP,W/O VENT,RIGID,STERILE: Brand: MEDLINE

## (undated) DEVICE — KT SURG TURNOVER 050

## (undated) DEVICE — APL DUPLOSPRAYER MIS 40CM

## (undated) DEVICE — VESSEL SEALER EXTEND: Brand: ENDOWRIST

## (undated) DEVICE — ARM DRAPE

## (undated) DEVICE — ADAPT CATH CONN URETRL

## (undated) DEVICE — PISTOL GRIP SKIN STAPLER: Brand: MULTIFIRE PREMIUM

## (undated) DEVICE — Device: Brand: TISSUE RETRIEVAL SYSTEM

## (undated) DEVICE — IRRIGATOR BULB ASEPTO 60CC STRL

## (undated) DEVICE — SUT ETHLN 2/0 PS 18IN 585H

## (undated) DEVICE — LEGGINGS, PAIR, 33X51 XL, STERILE: Brand: MEDLINE

## (undated) DEVICE — GAUZE,SPONGE,4"X4",16PLY,XRAY,STRL,LF: Brand: MEDLINE

## (undated) DEVICE — DRSNG SURESITE123 6X8IN

## (undated) DEVICE — WOUND RETRACTOR AND PROTECTOR: Brand: ALEXIS O WOUND PROTECTOR-RETRACTOR

## (undated) DEVICE — BLANKT WARM UPPR/BDY ARM/OUT 57X196CM

## (undated) DEVICE — TROC BLADLES ANCHORPORT/OPTI LP 8X120MM 1P/U